# Patient Record
Sex: FEMALE | Race: WHITE | NOT HISPANIC OR LATINO | Employment: OTHER | URBAN - METROPOLITAN AREA
[De-identification: names, ages, dates, MRNs, and addresses within clinical notes are randomized per-mention and may not be internally consistent; named-entity substitution may affect disease eponyms.]

---

## 2017-01-03 ENCOUNTER — ALLSCRIPTS OFFICE VISIT (OUTPATIENT)
Dept: OTHER | Facility: OTHER | Age: 82
End: 2017-01-03

## 2017-01-10 ENCOUNTER — ALLSCRIPTS OFFICE VISIT (OUTPATIENT)
Dept: OTHER | Facility: OTHER | Age: 82
End: 2017-01-10

## 2017-01-13 ENCOUNTER — GENERIC CONVERSION - ENCOUNTER (OUTPATIENT)
Dept: OTHER | Facility: OTHER | Age: 82
End: 2017-01-13

## 2017-01-24 ENCOUNTER — ALLSCRIPTS OFFICE VISIT (OUTPATIENT)
Dept: OTHER | Facility: OTHER | Age: 82
End: 2017-01-24

## 2017-01-24 ENCOUNTER — HOSPITAL ENCOUNTER (OUTPATIENT)
Dept: MRI IMAGING | Facility: HOSPITAL | Age: 82
Discharge: HOME | End: 2017-01-24
Payer: MEDICARE

## 2017-01-24 ENCOUNTER — TRANSCRIBE ORDERS (OUTPATIENT)
Dept: ADMINISTRATIVE | Facility: HOSPITAL | Age: 82
End: 2017-01-24

## 2017-01-24 DIAGNOSIS — M25.572 ACUTE LEFT ANKLE PAIN: ICD-10-CM

## 2017-01-24 DIAGNOSIS — M25.472 SWOLLEN ANKLES: Primary | ICD-10-CM

## 2017-01-24 DIAGNOSIS — M25.472 SWOLLEN ANKLES: ICD-10-CM

## 2017-01-24 DIAGNOSIS — M25.471 SWOLLEN ANKLES: ICD-10-CM

## 2017-01-24 DIAGNOSIS — M25.471 SWOLLEN ANKLES: Primary | ICD-10-CM

## 2017-01-24 PROCEDURE — 73721 MRI JNT OF LWR EXTRE W/O DYE: CPT

## 2017-02-01 ENCOUNTER — APPOINTMENT (OUTPATIENT)
Dept: PHYSICAL THERAPY | Facility: REHABILITATION | Age: 82
End: 2017-02-01
Payer: MEDICARE

## 2017-02-01 PROCEDURE — G8978 MOBILITY CURRENT STATUS: HCPCS

## 2017-02-01 PROCEDURE — 97110 THERAPEUTIC EXERCISES: CPT

## 2017-02-01 PROCEDURE — G8979 MOBILITY GOAL STATUS: HCPCS

## 2017-02-01 PROCEDURE — 97163 PT EVAL HIGH COMPLEX 45 MIN: CPT

## 2017-02-02 ENCOUNTER — APPOINTMENT (OUTPATIENT)
Dept: PHYSICAL THERAPY | Facility: REHABILITATION | Age: 82
End: 2017-02-02
Payer: MEDICARE

## 2017-02-02 PROCEDURE — 97110 THERAPEUTIC EXERCISES: CPT

## 2017-02-02 PROCEDURE — 97140 MANUAL THERAPY 1/> REGIONS: CPT

## 2017-02-06 ENCOUNTER — APPOINTMENT (OUTPATIENT)
Dept: PHYSICAL THERAPY | Facility: REHABILITATION | Age: 82
End: 2017-02-06
Payer: MEDICARE

## 2017-02-06 PROCEDURE — 97110 THERAPEUTIC EXERCISES: CPT

## 2017-02-06 PROCEDURE — 97140 MANUAL THERAPY 1/> REGIONS: CPT

## 2017-02-08 ENCOUNTER — APPOINTMENT (OUTPATIENT)
Dept: PHYSICAL THERAPY | Facility: REHABILITATION | Age: 82
End: 2017-02-08
Payer: MEDICARE

## 2017-02-08 PROCEDURE — 97110 THERAPEUTIC EXERCISES: CPT

## 2017-02-08 PROCEDURE — 97140 MANUAL THERAPY 1/> REGIONS: CPT

## 2017-02-13 ENCOUNTER — APPOINTMENT (OUTPATIENT)
Dept: PHYSICAL THERAPY | Facility: REHABILITATION | Age: 82
End: 2017-02-13
Payer: MEDICARE

## 2017-02-13 PROCEDURE — 97140 MANUAL THERAPY 1/> REGIONS: CPT

## 2017-02-14 ENCOUNTER — APPOINTMENT (OUTPATIENT)
Dept: PHYSICAL THERAPY | Facility: REHABILITATION | Age: 82
End: 2017-02-14
Payer: MEDICARE

## 2017-02-14 PROCEDURE — 97110 THERAPEUTIC EXERCISES: CPT

## 2017-02-14 PROCEDURE — 97140 MANUAL THERAPY 1/> REGIONS: CPT

## 2017-02-17 ENCOUNTER — APPOINTMENT (OUTPATIENT)
Dept: PHYSICAL THERAPY | Facility: REHABILITATION | Age: 82
End: 2017-02-17
Payer: MEDICARE

## 2017-02-17 PROCEDURE — 97140 MANUAL THERAPY 1/> REGIONS: CPT

## 2017-02-17 PROCEDURE — 97110 THERAPEUTIC EXERCISES: CPT

## 2017-02-21 ENCOUNTER — APPOINTMENT (OUTPATIENT)
Dept: PHYSICAL THERAPY | Facility: REHABILITATION | Age: 82
End: 2017-02-21
Payer: MEDICARE

## 2017-02-22 ENCOUNTER — APPOINTMENT (OUTPATIENT)
Dept: PHYSICAL THERAPY | Facility: REHABILITATION | Age: 82
End: 2017-02-22
Payer: MEDICARE

## 2017-02-23 ENCOUNTER — APPOINTMENT (OUTPATIENT)
Dept: PHYSICAL THERAPY | Facility: REHABILITATION | Age: 82
End: 2017-02-23
Payer: MEDICARE

## 2017-02-23 PROCEDURE — 97140 MANUAL THERAPY 1/> REGIONS: CPT

## 2017-02-23 PROCEDURE — 97110 THERAPEUTIC EXERCISES: CPT

## 2017-02-24 ENCOUNTER — APPOINTMENT (OUTPATIENT)
Dept: PHYSICAL THERAPY | Facility: REHABILITATION | Age: 82
End: 2017-02-24
Payer: MEDICARE

## 2017-02-27 ENCOUNTER — APPOINTMENT (OUTPATIENT)
Dept: PHYSICAL THERAPY | Facility: REHABILITATION | Age: 82
End: 2017-02-27
Payer: MEDICARE

## 2017-02-27 PROCEDURE — 97140 MANUAL THERAPY 1/> REGIONS: CPT

## 2017-02-27 PROCEDURE — 97110 THERAPEUTIC EXERCISES: CPT

## 2017-02-28 ENCOUNTER — APPOINTMENT (OUTPATIENT)
Dept: PHYSICAL THERAPY | Facility: REHABILITATION | Age: 82
End: 2017-02-28
Payer: MEDICARE

## 2017-03-02 ENCOUNTER — APPOINTMENT (OUTPATIENT)
Dept: LAB | Facility: CLINIC | Age: 82
End: 2017-03-02
Payer: MEDICARE

## 2017-03-02 ENCOUNTER — APPOINTMENT (OUTPATIENT)
Dept: PHYSICAL THERAPY | Facility: REHABILITATION | Age: 82
End: 2017-03-02
Payer: MEDICARE

## 2017-03-02 ENCOUNTER — TRANSCRIBE ORDERS (OUTPATIENT)
Dept: LAB | Facility: CLINIC | Age: 82
End: 2017-03-02

## 2017-03-02 DIAGNOSIS — J30.9 ALLERGIC RHINITIS: ICD-10-CM

## 2017-03-02 DIAGNOSIS — Z51.81 ENCOUNTER FOR THERAPEUTIC DRUG LEVEL MONITORING: ICD-10-CM

## 2017-03-02 DIAGNOSIS — N32.81 OVERACTIVE BLADDER: ICD-10-CM

## 2017-03-02 DIAGNOSIS — E78.5 HYPERLIPIDEMIA: ICD-10-CM

## 2017-03-02 DIAGNOSIS — G47.00 INSOMNIA: ICD-10-CM

## 2017-03-02 DIAGNOSIS — E55.9 VITAMIN D DEFICIENCY: ICD-10-CM

## 2017-03-02 DIAGNOSIS — I65.29 OCCLUSION AND STENOSIS OF UNSPECIFIED CAROTID ARTERY: ICD-10-CM

## 2017-03-02 DIAGNOSIS — I10 ESSENTIAL (PRIMARY) HYPERTENSION: ICD-10-CM

## 2017-03-02 DIAGNOSIS — E11.65 TYPE 2 DIABETES MELLITUS WITH HYPERGLYCEMIA (HCC): ICD-10-CM

## 2017-03-02 DIAGNOSIS — J45.909 UNCOMPLICATED ASTHMA: ICD-10-CM

## 2017-03-02 LAB
25(OH)D3 SERPL-MCNC: 9 NG/ML (ref 30–100)
ALBUMIN SERPL BCP-MCNC: 3.2 G/DL (ref 3.5–5)
ALP SERPL-CCNC: 94 U/L (ref 46–116)
ALT SERPL W P-5'-P-CCNC: 15 U/L (ref 12–78)
ANION GAP SERPL CALCULATED.3IONS-SCNC: 7 MMOL/L (ref 4–13)
AST SERPL W P-5'-P-CCNC: 10 U/L (ref 5–45)
BASOPHILS # BLD AUTO: 0.03 THOUSANDS/ΜL (ref 0–0.1)
BASOPHILS NFR BLD AUTO: 0 % (ref 0–1)
BILIRUB SERPL-MCNC: 0.71 MG/DL (ref 0.2–1)
BUN SERPL-MCNC: 19 MG/DL (ref 5–25)
CALCIUM SERPL-MCNC: 8.8 MG/DL (ref 8.3–10.1)
CHLORIDE SERPL-SCNC: 102 MMOL/L (ref 100–108)
CHOLEST SERPL-MCNC: 140 MG/DL (ref 50–200)
CO2 SERPL-SCNC: 29 MMOL/L (ref 21–32)
CREAT SERPL-MCNC: 0.86 MG/DL (ref 0.6–1.3)
CREAT UR-MCNC: 163 MG/DL
EOSINOPHIL # BLD AUTO: 0.27 THOUSAND/ΜL (ref 0–0.61)
EOSINOPHIL NFR BLD AUTO: 2 % (ref 0–6)
ERYTHROCYTE [DISTWIDTH] IN BLOOD BY AUTOMATED COUNT: 13.8 % (ref 11.6–15.1)
EST. AVERAGE GLUCOSE BLD GHB EST-MCNC: 209 MG/DL
GFR SERPL CREATININE-BSD FRML MDRD: >60 ML/MIN/1.73SQ M
GLUCOSE SERPL-MCNC: 254 MG/DL (ref 65–140)
HBA1C MFR BLD: 8.9 % (ref 4.2–6.3)
HCT VFR BLD AUTO: 40.7 % (ref 34.8–46.1)
HDLC SERPL-MCNC: 65 MG/DL (ref 40–60)
HGB BLD-MCNC: 13.1 G/DL (ref 11.5–15.4)
LDLC SERPL CALC-MCNC: 49 MG/DL (ref 0–100)
LYMPHOCYTES # BLD AUTO: 2.22 THOUSANDS/ΜL (ref 0.6–4.47)
LYMPHOCYTES NFR BLD AUTO: 19 % (ref 14–44)
MCH RBC QN AUTO: 28.5 PG (ref 26.8–34.3)
MCHC RBC AUTO-ENTMCNC: 32.2 G/DL (ref 31.4–37.4)
MCV RBC AUTO: 89 FL (ref 82–98)
MICROALBUMIN UR-MCNC: 18.3 MG/L (ref 0–20)
MICROALBUMIN/CREAT 24H UR: 11 MG/G CREATININE (ref 0–30)
MONOCYTES # BLD AUTO: 0.73 THOUSAND/ΜL (ref 0.17–1.22)
MONOCYTES NFR BLD AUTO: 6 % (ref 4–12)
NEUTROPHILS # BLD AUTO: 8.64 THOUSANDS/ΜL (ref 1.85–7.62)
NEUTS SEG NFR BLD AUTO: 73 % (ref 43–75)
NRBC BLD AUTO-RTO: 0 /100 WBCS
PLATELET # BLD AUTO: 246 THOUSANDS/UL (ref 149–390)
PMV BLD AUTO: 10.8 FL (ref 8.9–12.7)
POTASSIUM SERPL-SCNC: 4.3 MMOL/L (ref 3.5–5.3)
PROT SERPL-MCNC: 6.6 G/DL (ref 6.4–8.2)
RBC # BLD AUTO: 4.59 MILLION/UL (ref 3.81–5.12)
SODIUM SERPL-SCNC: 138 MMOL/L (ref 136–145)
T4 FREE SERPL-MCNC: 1.15 NG/DL (ref 0.76–1.46)
TRIGL SERPL-MCNC: 131 MG/DL
TSH SERPL DL<=0.05 MIU/L-ACNC: 1.86 UIU/ML (ref 0.36–3.74)
WBC # BLD AUTO: 11.94 THOUSAND/UL (ref 4.31–10.16)

## 2017-03-02 PROCEDURE — 82043 UR ALBUMIN QUANTITATIVE: CPT

## 2017-03-02 PROCEDURE — 80053 COMPREHEN METABOLIC PANEL: CPT

## 2017-03-02 PROCEDURE — 80061 LIPID PANEL: CPT

## 2017-03-02 PROCEDURE — 82570 ASSAY OF URINE CREATININE: CPT

## 2017-03-02 PROCEDURE — 84439 ASSAY OF FREE THYROXINE: CPT

## 2017-03-02 PROCEDURE — 97110 THERAPEUTIC EXERCISES: CPT

## 2017-03-02 PROCEDURE — 83036 HEMOGLOBIN GLYCOSYLATED A1C: CPT

## 2017-03-02 PROCEDURE — 85025 COMPLETE CBC W/AUTO DIFF WBC: CPT

## 2017-03-02 PROCEDURE — G8978 MOBILITY CURRENT STATUS: HCPCS

## 2017-03-02 PROCEDURE — 82306 VITAMIN D 25 HYDROXY: CPT

## 2017-03-02 PROCEDURE — 84443 ASSAY THYROID STIM HORMONE: CPT

## 2017-03-02 PROCEDURE — G8979 MOBILITY GOAL STATUS: HCPCS

## 2017-03-02 PROCEDURE — 36415 COLL VENOUS BLD VENIPUNCTURE: CPT

## 2017-03-02 PROCEDURE — 97140 MANUAL THERAPY 1/> REGIONS: CPT

## 2017-03-03 ENCOUNTER — APPOINTMENT (OUTPATIENT)
Dept: PHYSICAL THERAPY | Facility: REHABILITATION | Age: 82
End: 2017-03-03
Payer: MEDICARE

## 2017-03-06 ENCOUNTER — APPOINTMENT (OUTPATIENT)
Dept: PHYSICAL THERAPY | Facility: REHABILITATION | Age: 82
End: 2017-03-06
Payer: MEDICARE

## 2017-03-06 PROCEDURE — 97110 THERAPEUTIC EXERCISES: CPT

## 2017-03-06 PROCEDURE — 97140 MANUAL THERAPY 1/> REGIONS: CPT

## 2017-03-07 ENCOUNTER — APPOINTMENT (OUTPATIENT)
Dept: PHYSICAL THERAPY | Facility: REHABILITATION | Age: 82
End: 2017-03-07
Payer: MEDICARE

## 2017-03-07 PROCEDURE — 97110 THERAPEUTIC EXERCISES: CPT

## 2017-03-07 PROCEDURE — 97140 MANUAL THERAPY 1/> REGIONS: CPT

## 2017-03-08 ENCOUNTER — ALLSCRIPTS OFFICE VISIT (OUTPATIENT)
Dept: OTHER | Facility: OTHER | Age: 82
End: 2017-03-08

## 2017-03-09 ENCOUNTER — APPOINTMENT (OUTPATIENT)
Dept: PHYSICAL THERAPY | Facility: REHABILITATION | Age: 82
End: 2017-03-09
Payer: MEDICARE

## 2017-03-09 PROCEDURE — 97112 NEUROMUSCULAR REEDUCATION: CPT

## 2017-03-09 PROCEDURE — 97110 THERAPEUTIC EXERCISES: CPT

## 2017-03-13 ENCOUNTER — APPOINTMENT (OUTPATIENT)
Dept: PHYSICAL THERAPY | Facility: REHABILITATION | Age: 82
End: 2017-03-13
Payer: MEDICARE

## 2017-03-13 PROCEDURE — 97110 THERAPEUTIC EXERCISES: CPT

## 2017-03-14 ENCOUNTER — APPOINTMENT (OUTPATIENT)
Dept: PHYSICAL THERAPY | Facility: REHABILITATION | Age: 82
End: 2017-03-14
Payer: MEDICARE

## 2017-03-16 ENCOUNTER — APPOINTMENT (OUTPATIENT)
Dept: PHYSICAL THERAPY | Facility: REHABILITATION | Age: 82
End: 2017-03-16
Payer: MEDICARE

## 2017-03-20 ENCOUNTER — APPOINTMENT (OUTPATIENT)
Dept: PHYSICAL THERAPY | Facility: REHABILITATION | Age: 82
End: 2017-03-20
Payer: MEDICARE

## 2017-03-21 ENCOUNTER — APPOINTMENT (OUTPATIENT)
Dept: PHYSICAL THERAPY | Facility: REHABILITATION | Age: 82
End: 2017-03-21
Payer: MEDICARE

## 2017-03-22 ENCOUNTER — APPOINTMENT (OUTPATIENT)
Dept: PHYSICAL THERAPY | Facility: REHABILITATION | Age: 82
End: 2017-03-22
Payer: MEDICARE

## 2017-03-23 ENCOUNTER — APPOINTMENT (OUTPATIENT)
Dept: PHYSICAL THERAPY | Facility: REHABILITATION | Age: 82
End: 2017-03-23
Payer: MEDICARE

## 2017-03-27 ENCOUNTER — APPOINTMENT (OUTPATIENT)
Dept: PHYSICAL THERAPY | Facility: REHABILITATION | Age: 82
End: 2017-03-27
Payer: MEDICARE

## 2017-03-27 PROCEDURE — 97112 NEUROMUSCULAR REEDUCATION: CPT

## 2017-03-27 PROCEDURE — 97110 THERAPEUTIC EXERCISES: CPT

## 2017-03-28 ENCOUNTER — APPOINTMENT (OUTPATIENT)
Dept: PHYSICAL THERAPY | Facility: REHABILITATION | Age: 82
End: 2017-03-28
Payer: MEDICARE

## 2017-03-30 ENCOUNTER — APPOINTMENT (OUTPATIENT)
Dept: PHYSICAL THERAPY | Facility: REHABILITATION | Age: 82
End: 2017-03-30
Payer: MEDICARE

## 2017-03-30 PROCEDURE — 97110 THERAPEUTIC EXERCISES: CPT

## 2017-03-30 PROCEDURE — 97116 GAIT TRAINING THERAPY: CPT

## 2017-03-30 PROCEDURE — 97140 MANUAL THERAPY 1/> REGIONS: CPT

## 2017-03-30 PROCEDURE — G8979 MOBILITY GOAL STATUS: HCPCS

## 2017-03-30 PROCEDURE — G8978 MOBILITY CURRENT STATUS: HCPCS

## 2017-04-03 ENCOUNTER — APPOINTMENT (OUTPATIENT)
Dept: PHYSICAL THERAPY | Facility: REHABILITATION | Age: 82
End: 2017-04-03
Payer: MEDICARE

## 2017-04-04 ENCOUNTER — APPOINTMENT (OUTPATIENT)
Dept: PHYSICAL THERAPY | Facility: REHABILITATION | Age: 82
End: 2017-04-04
Payer: MEDICARE

## 2017-04-04 PROCEDURE — 97112 NEUROMUSCULAR REEDUCATION: CPT

## 2017-04-04 PROCEDURE — 97110 THERAPEUTIC EXERCISES: CPT

## 2017-04-06 ENCOUNTER — APPOINTMENT (OUTPATIENT)
Dept: PHYSICAL THERAPY | Facility: REHABILITATION | Age: 82
End: 2017-04-06
Payer: MEDICARE

## 2017-04-06 PROCEDURE — 97112 NEUROMUSCULAR REEDUCATION: CPT

## 2017-04-06 PROCEDURE — 97110 THERAPEUTIC EXERCISES: CPT

## 2017-04-10 ENCOUNTER — APPOINTMENT (OUTPATIENT)
Dept: PHYSICAL THERAPY | Facility: REHABILITATION | Age: 82
End: 2017-04-10
Payer: MEDICARE

## 2017-04-10 PROCEDURE — 97110 THERAPEUTIC EXERCISES: CPT

## 2017-04-10 PROCEDURE — 97112 NEUROMUSCULAR REEDUCATION: CPT

## 2017-04-13 ENCOUNTER — APPOINTMENT (OUTPATIENT)
Dept: PHYSICAL THERAPY | Facility: REHABILITATION | Age: 82
End: 2017-04-13
Payer: MEDICARE

## 2017-04-13 PROCEDURE — 97110 THERAPEUTIC EXERCISES: CPT

## 2017-04-13 PROCEDURE — 97112 NEUROMUSCULAR REEDUCATION: CPT

## 2017-04-18 ENCOUNTER — APPOINTMENT (OUTPATIENT)
Dept: PHYSICAL THERAPY | Facility: REHABILITATION | Age: 82
End: 2017-04-18
Payer: MEDICARE

## 2017-04-18 PROCEDURE — 97112 NEUROMUSCULAR REEDUCATION: CPT

## 2017-04-18 PROCEDURE — 97110 THERAPEUTIC EXERCISES: CPT

## 2017-04-20 ENCOUNTER — APPOINTMENT (OUTPATIENT)
Dept: PHYSICAL THERAPY | Facility: REHABILITATION | Age: 82
End: 2017-04-20
Payer: MEDICARE

## 2017-04-20 PROCEDURE — 97110 THERAPEUTIC EXERCISES: CPT

## 2017-04-20 PROCEDURE — 97112 NEUROMUSCULAR REEDUCATION: CPT

## 2017-04-24 ENCOUNTER — APPOINTMENT (OUTPATIENT)
Dept: PHYSICAL THERAPY | Facility: REHABILITATION | Age: 82
End: 2017-04-24
Payer: MEDICARE

## 2017-04-24 PROCEDURE — 97110 THERAPEUTIC EXERCISES: CPT

## 2017-04-24 PROCEDURE — 97112 NEUROMUSCULAR REEDUCATION: CPT

## 2017-04-27 ENCOUNTER — APPOINTMENT (OUTPATIENT)
Dept: PHYSICAL THERAPY | Facility: REHABILITATION | Age: 82
End: 2017-04-27
Payer: MEDICARE

## 2017-04-27 PROCEDURE — G8991 OTHER PT/OT GOAL STATUS: HCPCS

## 2017-04-27 PROCEDURE — 97140 MANUAL THERAPY 1/> REGIONS: CPT

## 2017-04-27 PROCEDURE — 97110 THERAPEUTIC EXERCISES: CPT

## 2017-04-27 PROCEDURE — 97112 NEUROMUSCULAR REEDUCATION: CPT

## 2017-04-27 PROCEDURE — G8990 OTHER PT/OT CURRENT STATUS: HCPCS

## 2017-05-02 ENCOUNTER — APPOINTMENT (OUTPATIENT)
Dept: PHYSICAL THERAPY | Facility: REHABILITATION | Age: 82
End: 2017-05-02
Payer: MEDICARE

## 2017-05-02 PROCEDURE — 97140 MANUAL THERAPY 1/> REGIONS: CPT

## 2017-05-02 PROCEDURE — 97110 THERAPEUTIC EXERCISES: CPT

## 2017-05-03 ENCOUNTER — APPOINTMENT (OUTPATIENT)
Dept: PHYSICAL THERAPY | Facility: REHABILITATION | Age: 82
End: 2017-05-03
Payer: MEDICARE

## 2017-05-03 PROCEDURE — 97112 NEUROMUSCULAR REEDUCATION: CPT

## 2017-05-03 PROCEDURE — 97150 GROUP THERAPEUTIC PROCEDURES: CPT

## 2017-05-03 PROCEDURE — 97110 THERAPEUTIC EXERCISES: CPT

## 2017-05-04 ENCOUNTER — APPOINTMENT (OUTPATIENT)
Dept: PHYSICAL THERAPY | Facility: REHABILITATION | Age: 82
End: 2017-05-04
Payer: MEDICARE

## 2017-05-09 ENCOUNTER — APPOINTMENT (OUTPATIENT)
Dept: PHYSICAL THERAPY | Facility: REHABILITATION | Age: 82
End: 2017-05-09
Payer: MEDICARE

## 2017-05-09 PROCEDURE — 97112 NEUROMUSCULAR REEDUCATION: CPT

## 2017-05-09 PROCEDURE — 97116 GAIT TRAINING THERAPY: CPT

## 2017-05-09 PROCEDURE — 97110 THERAPEUTIC EXERCISES: CPT

## 2017-05-11 ENCOUNTER — APPOINTMENT (OUTPATIENT)
Dept: PHYSICAL THERAPY | Facility: REHABILITATION | Age: 82
End: 2017-05-11
Payer: MEDICARE

## 2017-05-11 PROCEDURE — 97110 THERAPEUTIC EXERCISES: CPT

## 2017-05-11 PROCEDURE — 97116 GAIT TRAINING THERAPY: CPT

## 2017-05-11 PROCEDURE — 97112 NEUROMUSCULAR REEDUCATION: CPT

## 2017-05-16 ENCOUNTER — APPOINTMENT (OUTPATIENT)
Dept: PHYSICAL THERAPY | Facility: REHABILITATION | Age: 82
End: 2017-05-16
Payer: MEDICARE

## 2017-05-16 PROCEDURE — 97116 GAIT TRAINING THERAPY: CPT

## 2017-05-16 PROCEDURE — 97110 THERAPEUTIC EXERCISES: CPT

## 2017-05-16 PROCEDURE — 97112 NEUROMUSCULAR REEDUCATION: CPT

## 2017-05-18 ENCOUNTER — APPOINTMENT (OUTPATIENT)
Dept: PHYSICAL THERAPY | Facility: REHABILITATION | Age: 82
End: 2017-05-18
Payer: MEDICARE

## 2017-05-18 PROCEDURE — 97116 GAIT TRAINING THERAPY: CPT

## 2017-05-18 PROCEDURE — 97112 NEUROMUSCULAR REEDUCATION: CPT

## 2017-05-22 ENCOUNTER — APPOINTMENT (OUTPATIENT)
Dept: PHYSICAL THERAPY | Facility: REHABILITATION | Age: 82
End: 2017-05-22
Payer: MEDICARE

## 2017-05-22 PROCEDURE — 97116 GAIT TRAINING THERAPY: CPT

## 2017-05-22 PROCEDURE — 97112 NEUROMUSCULAR REEDUCATION: CPT

## 2017-05-22 PROCEDURE — 97110 THERAPEUTIC EXERCISES: CPT

## 2017-05-23 ENCOUNTER — APPOINTMENT (OUTPATIENT)
Dept: PHYSICAL THERAPY | Facility: REHABILITATION | Age: 82
End: 2017-05-23
Payer: MEDICARE

## 2017-05-25 ENCOUNTER — APPOINTMENT (OUTPATIENT)
Dept: PHYSICAL THERAPY | Facility: REHABILITATION | Age: 82
End: 2017-05-25
Payer: MEDICARE

## 2017-05-25 PROCEDURE — 97110 THERAPEUTIC EXERCISES: CPT

## 2017-05-25 PROCEDURE — G8991 OTHER PT/OT GOAL STATUS: HCPCS

## 2017-05-25 PROCEDURE — G8992 OTHER PT/OT  D/C STATUS: HCPCS

## 2017-05-25 PROCEDURE — 97116 GAIT TRAINING THERAPY: CPT

## 2017-05-25 PROCEDURE — 97112 NEUROMUSCULAR REEDUCATION: CPT

## 2017-05-30 ENCOUNTER — APPOINTMENT (OUTPATIENT)
Dept: PHYSICAL THERAPY | Facility: REHABILITATION | Age: 82
End: 2017-05-30
Payer: MEDICARE

## 2017-06-05 ENCOUNTER — GENERIC CONVERSION - ENCOUNTER (OUTPATIENT)
Dept: OTHER | Facility: OTHER | Age: 82
End: 2017-06-05

## 2017-06-08 ENCOUNTER — TRANSCRIBE ORDERS (OUTPATIENT)
Dept: LAB | Facility: CLINIC | Age: 82
End: 2017-06-08

## 2017-06-08 ENCOUNTER — APPOINTMENT (OUTPATIENT)
Dept: LAB | Facility: CLINIC | Age: 82
End: 2017-06-08
Payer: MEDICARE

## 2017-06-08 DIAGNOSIS — I10 ESSENTIAL (PRIMARY) HYPERTENSION: ICD-10-CM

## 2017-06-08 DIAGNOSIS — E55.9 VITAMIN D DEFICIENCY: ICD-10-CM

## 2017-06-08 DIAGNOSIS — E78.5 HYPERLIPIDEMIA: ICD-10-CM

## 2017-06-08 DIAGNOSIS — E11.65 TYPE 2 DIABETES MELLITUS WITH HYPERGLYCEMIA (HCC): ICD-10-CM

## 2017-06-08 LAB
25(OH)D3 SERPL-MCNC: 48.9 NG/ML (ref 30–100)
ANION GAP SERPL CALCULATED.3IONS-SCNC: 8 MMOL/L (ref 4–13)
BUN SERPL-MCNC: 19 MG/DL (ref 5–25)
CALCIUM SERPL-MCNC: 9.5 MG/DL (ref 8.3–10.1)
CHLORIDE SERPL-SCNC: 101 MMOL/L (ref 100–108)
CO2 SERPL-SCNC: 29 MMOL/L (ref 21–32)
CREAT SERPL-MCNC: 0.93 MG/DL (ref 0.6–1.3)
EST. AVERAGE GLUCOSE BLD GHB EST-MCNC: 217 MG/DL
GFR SERPL CREATININE-BSD FRML MDRD: 57.7 ML/MIN/1.73SQ M
GLUCOSE P FAST SERPL-MCNC: 239 MG/DL (ref 65–99)
HBA1C MFR BLD: 9.2 % (ref 4.2–6.3)
POTASSIUM SERPL-SCNC: 4.5 MMOL/L (ref 3.5–5.3)
SODIUM SERPL-SCNC: 138 MMOL/L (ref 136–145)

## 2017-06-08 PROCEDURE — 82306 VITAMIN D 25 HYDROXY: CPT

## 2017-06-08 PROCEDURE — 36415 COLL VENOUS BLD VENIPUNCTURE: CPT

## 2017-06-08 PROCEDURE — 83036 HEMOGLOBIN GLYCOSYLATED A1C: CPT

## 2017-06-08 PROCEDURE — 80048 BASIC METABOLIC PNL TOTAL CA: CPT

## 2017-06-13 ENCOUNTER — ALLSCRIPTS OFFICE VISIT (OUTPATIENT)
Dept: OTHER | Facility: OTHER | Age: 82
End: 2017-06-13

## 2017-07-11 ENCOUNTER — TRANSCRIBE ORDERS (OUTPATIENT)
Dept: ADMINISTRATIVE | Facility: HOSPITAL | Age: 82
End: 2017-07-11

## 2017-07-11 DIAGNOSIS — Z12.31 VISIT FOR SCREENING MAMMOGRAM: Primary | ICD-10-CM

## 2017-07-11 DIAGNOSIS — Z12.31 ENCOUNTER FOR SCREENING MAMMOGRAM FOR MALIGNANT NEOPLASM OF BREAST: ICD-10-CM

## 2017-07-21 ENCOUNTER — HOSPITAL ENCOUNTER (OUTPATIENT)
Dept: MAMMOGRAPHY | Facility: MEDICAL CENTER | Age: 82
Discharge: HOME | End: 2017-07-21
Payer: MEDICARE

## 2017-07-21 DIAGNOSIS — Z12.31 ENCOUNTER FOR SCREENING MAMMOGRAM FOR MALIGNANT NEOPLASM OF BREAST: ICD-10-CM

## 2017-07-21 PROCEDURE — G0202 SCR MAMMO BI INCL CAD: HCPCS

## 2017-07-21 PROCEDURE — 77063 BREAST TOMOSYNTHESIS BI: CPT

## 2017-07-24 ENCOUNTER — GENERIC CONVERSION - ENCOUNTER (OUTPATIENT)
Dept: OTHER | Facility: OTHER | Age: 82
End: 2017-07-24

## 2017-09-13 DIAGNOSIS — E11.65 TYPE 2 DIABETES MELLITUS WITH HYPERGLYCEMIA (HCC): ICD-10-CM

## 2018-01-10 ENCOUNTER — TRANSCRIBE ORDERS (OUTPATIENT)
Dept: LAB | Facility: CLINIC | Age: 83
End: 2018-01-10

## 2018-01-10 ENCOUNTER — APPOINTMENT (OUTPATIENT)
Dept: LAB | Facility: CLINIC | Age: 83
End: 2018-01-10
Payer: MEDICARE

## 2018-01-10 DIAGNOSIS — Z01.818 PREOP EXAMINATION: Primary | ICD-10-CM

## 2018-01-10 LAB
BASOPHILS # BLD AUTO: 0.04 THOUSANDS/ΜL (ref 0–0.1)
BASOPHILS NFR BLD AUTO: 0 % (ref 0–1)
EOSINOPHIL # BLD AUTO: 0.2 THOUSAND/ΜL (ref 0–0.61)
EOSINOPHIL NFR BLD AUTO: 2 % (ref 0–6)
ERYTHROCYTE [DISTWIDTH] IN BLOOD BY AUTOMATED COUNT: 13.2 % (ref 11.6–15.1)
HCT VFR BLD AUTO: 44.1 % (ref 34.8–46.1)
HGB BLD-MCNC: 14 G/DL (ref 11.5–15.4)
LYMPHOCYTES # BLD AUTO: 2.97 THOUSANDS/ΜL (ref 0.6–4.47)
LYMPHOCYTES NFR BLD AUTO: 24 % (ref 14–44)
MCH RBC QN AUTO: 29.5 PG (ref 26.8–34.3)
MCHC RBC AUTO-ENTMCNC: 31.7 G/DL (ref 31.4–37.4)
MCV RBC AUTO: 93 FL (ref 82–98)
MONOCYTES # BLD AUTO: 0.73 THOUSAND/ΜL (ref 0.17–1.22)
MONOCYTES NFR BLD AUTO: 6 % (ref 4–12)
NEUTROPHILS # BLD AUTO: 8.43 THOUSANDS/ΜL (ref 1.85–7.62)
NEUTS SEG NFR BLD AUTO: 68 % (ref 43–75)
NRBC BLD AUTO-RTO: 0 /100 WBCS
PLATELET # BLD AUTO: 269 THOUSANDS/UL (ref 149–390)
PMV BLD AUTO: 10.6 FL (ref 8.9–12.7)
RBC # BLD AUTO: 4.74 MILLION/UL (ref 3.81–5.12)
WBC # BLD AUTO: 12.4 THOUSAND/UL (ref 4.31–10.16)

## 2018-01-10 PROCEDURE — 36415 COLL VENOUS BLD VENIPUNCTURE: CPT

## 2018-01-10 PROCEDURE — 85025 COMPLETE CBC W/AUTO DIFF WBC: CPT

## 2018-01-11 NOTE — RESULT NOTES
Verified Results  (1) URINALYSIS w URINE C/S REFLEX (will reflex a microscopy if leukocytes, occult blood, or nitrites are not within normal limits) [id number] 10:11AM John Vargas     Test Name Result Flag Reference   CLINICAL REPORT (Report)     Test:        Urine culture  Specimen Type:   Urine  Specimen Date:   1/14/2016 9:22 PM  Result Date:    1/16/2016 10:11 AM  Result Status:   Final result  Resulting Lab:   [place] [address]            Tel: [phone]                 CULTURE                                       ------------------                                   >100,000 cfu/ml Mixed Contaminants X5     *** Suggest repeat specimen ***

## 2018-01-11 NOTE — RESULT NOTES
Discussion/Summary   Mammo normal    Repeat in 1 year  CB     Verified Results  MAMMO SCREENING BILATERAL W 3D & CAD [id number] 10:33AM Tom Soriano Order Number: [id number]    - Patient Instructions: To schedule this appointment, please contact Central Scheduling at [phone]  Do not wear any perfume, powder, lotion or deodorant on breast or underarm area  Please bring your doctors order, referral (if needed) and insurance information with you on the day of the test  Failure to bring this information may result in this test being rescheduled  Arrive 15 minutes prior to your appointment time to register  On the day of your test, please bring any prior mammogram or breast studies with you that were not performed at a [facility]  Failure to bring prior exams may result in your test needing to be rescheduled  Test Name Result Flag Reference   MAMMO SCREENING BILATERAL W 3D & CAD (Report)     Patient History:   Patient is postmenopausal and has history of colorectal cancer at   age 52  Family history of breast cancer in maternal grandmother, unknown    cancer in father, unknown cancer in mother  No Hormone Replacement Therapy   Patient has never smoked  Patient's BMI is 36 9  Reason for exam: screening, asymptomatic  Mammo Screening Bilateral W DBT and CAD: July 21, 2017 - Check In   #: [de-identified]   2D/3D Procedure   3D views: Bilateral MLO view(s) were taken  2D views: Bilateral CC view(s) were taken  Technologist: RT Rita(R)(M)   Prior study comparison: July 19, 2016, mammo screening bilateral    W CAD performed at [place] [address]  July 8, 2015, bilateral digital screening mammogram performed at    [place] [address]  July 2, 2014,    bilateral digital screening mammogram performed at [place] [address]  June 20, 2013, bilateral    mammogram, performed at [facility]  June 14, 2012,    bilateral mammogram, performed at [facility]  There are scattered fibroglandular densities  A combination of    mediolateral oblique 3-D tomographic slices as well as standard    two-dimensional orthogonal images were obtained  The parenchymal pattern appears stable  No dominant soft tissue    mass or suspicious calcifications are noted  The skin and nipple   contours are within normal limits  No mammographic evidence of malignancy  No    significant changes when compared with prior studies  ACR BI-RADSï¾® Assessments: BiRad:1 - Negative     Recommendation:   Routine screening mammogram in 1 year  A reminder letter will be   scheduled  The patient is scheduled in a reminder system  8-10% of cancers will be missed on mammography  Management of a    palpable abnormality must be based on clinical grounds  Patients    will be notified of their results via letter from our facility  Accredited by [employer] and FDA       Transcription Location: [facility] 98: [id number]     Risk Value(s):   Tyrer-Cuzick 10 Year: 1 500%, Tyrer-Cuzick Lifetime: 1 500%,    Myriad Table: 1 5%, EDWINA 5 Year: 1 4%, NCI Lifetime: 1 9%   Signed by:   Toña Jsohi MD   7/21/17

## 2018-01-11 NOTE — MISCELLANEOUS
Message   Recorded as Task   Date: 06/15/2016 10:44 AM, Created By: Jayson Holden   Task Name: Call Back   Assigned To: Marian Russell   Regarding Patient: Raj Godwin, Status: Active   Comment:    Marian Russell - 15 Wm 2016 10:44 AM     TASK CREATED  pt called requesting refill on lisinopril 10 mg that was started by ortho after her knee replacement surgery  she was told to discontinue benazepril - this was noted in Dr Oj Marcus note of 25 May  is it ok to continue with lisinopril? she will need refills    Marian Russell - 15 Wm 2016 10:44 AM     TASK EDITED  pt uses [facility]  90 day   Chloe Levy - 15 Wm 2016 1:29 PM     TASK REPLIED TO: Previously Assigned To Chloe Levy  Yes  Continue Lisinopril 10 mg daily  Thank you,  CB   Marian Russell - 15 Wm 2016 3:59 PM     TASK EDITED  Rx sent in, patient notified        Active Problems    1  Allergic rhinitis (477 9) (J30 9)   2  Anticoagulated on Coumadin (V58 83,V58 61) (Z51 81,Z79 01)   3  Asthma (493 90) (J45 909)   4  Carotid artery stenosis (433 10) (I65 29)   5  Cough (786 2) (R05)   6  Diabetes mellitus type 2, uncontrolled (250 02) (E11 65)   7  Diarrhea (787 91) (R19 7)   8  H/O colon cancer, stage I (V10 05) (Z85 038)   9  Hyperlipidemia (272 4) (E78 5)   10  Hypertension (401 9) (I10)   11  Initial Medicare annual wellness visit (V70 0) (Z00 00)   12  Insomnia (780 52) (G47 00)   13  Need for pneumococcal vaccine (V03 82) (Z23)   14  Need for prophylactic vaccination and inoculation against influenza (V04 81) (Z23)   15  OAB (overactive bladder) (596 51) (N32 81)   16  Osteoarthritis (715 90) (M19 90)   17  Preoperative examination (V72 84) (Z01 818)   18  Screening for genitourinary condition (V81 6) (Z13 89)   19  Screening for glaucoma (V80 1) (Z13 5)   20  Screening for neurological condition (V80 09) (Z13 89)   21  Status post total left knee replacement (V43 65) (Z96 652)   22   Urgency of urination (118 63) (R39 15)   23  Visit for pre-operative examination (V72 84) (Z01 818)   24  Visit for screening mammogram (V76 12) (Z12 31)   25  Vitamin D deficiency (268 9) (E55 9)    Current Meds   1  Breo Ellipta 100-25 MCG/INH Inhalation Aerosol Powder Breath Activated; ONE   INHALATION DAILY  AFTER INHALATION RINSE MOUTH WITH WATER & SPIT  USE   SAME TIME EACH DAY, NO MORE THAN 1 TIME IN 24 HOURS; Therapy: 50SMM5322 to (Last Rx:05Apr2016)  Requested for: 05Apr2016 Ordered   2  Glimepiride 2 MG Oral Tablet; TAKE 1 TABLET ONCE DAILY; Therapy: 19ICJ9343 to (Evaluate:10Mar2017)  Requested for: 04KZR2711; Last   Rx:15Mar2016 Ordered   3  MetFORMIN HCl - 500 MG Oral Tablet; TAKE TWO (2) TABLET(S) TWICE DAILY WITH A   MEAL; Therapy: 14GAQ3910 to (Evaluate:13Jun2016)  Requested for: 95QME4783; Last   Rx:99Pxn3016 Ordered   4  OneTouch Ultra 2 w/Device Kit; USE AS DIRECTED  Once daily; Therapy: 74MXD8743 to (Last Rx:13Jan2016) Ordered   5  Oxybutynin Chloride 5 MG Oral Tablet; TAKE 1 TABLET AT BEDTIME; Therapy: 55SAG1893 to (Last Rx:20Jan2016)  Requested for: 20Jan2016 Ordered   6  Simvastatin 10 MG Oral Tablet; TAKE 1 TABLET DAILY AS DIRECTED; Therapy: 96ORZ1072 to (Evaluate:25Mar2016)  Requested for: 82XYR1806; Last   Rx:31Mar2015 Ordered    Allergies    1  Codeine   2  Doxycycline Hyclate TABS   3   Penicillins    Plan  Hypertension    · Lisinopril 10 MG Oral Tablet; TAKE 1 TABLET DAILY    Signatures   Electronically signed by : Jamshid Glass, ; Wm 15 2016  4:00PM EST                       (Author)

## 2018-01-11 NOTE — RESULT NOTES
Verified Results  (1) URINE CULTURE 29Jan2016 08:34PM Ania Vargas     Test Name Result Flag Reference   CLINICAL REPORT (Report)     Test:        Urine culture  Specimen Type:   Urine  Specimen Date:   1/29/2016 8:34 PM  Result Date:    1/30/2016 5:47 PM  Result Status:   Final result  Resulting Lab:    [address] [place] [address]            Tel: [phone]                 CULTURE                                       ------------------                                   60,000-69,000 cfu/ml Mixed Contaminants X4

## 2018-01-12 VITALS
WEIGHT: 220 LBS | HEIGHT: 64 IN | SYSTOLIC BLOOD PRESSURE: 140 MMHG | HEART RATE: 88 BPM | BODY MASS INDEX: 37.56 KG/M2 | DIASTOLIC BLOOD PRESSURE: 82 MMHG

## 2018-01-13 VITALS — DIASTOLIC BLOOD PRESSURE: 60 MMHG | HEART RATE: 80 BPM | SYSTOLIC BLOOD PRESSURE: 140 MMHG | WEIGHT: 218 LBS

## 2018-01-14 VITALS
HEIGHT: 64 IN | BODY MASS INDEX: 36.7 KG/M2 | SYSTOLIC BLOOD PRESSURE: 132 MMHG | HEART RATE: 64 BPM | WEIGHT: 215 LBS | DIASTOLIC BLOOD PRESSURE: 74 MMHG

## 2018-01-14 VITALS
HEIGHT: 64 IN | WEIGHT: 218 LBS | SYSTOLIC BLOOD PRESSURE: 132 MMHG | DIASTOLIC BLOOD PRESSURE: 80 MMHG | BODY MASS INDEX: 37.22 KG/M2 | TEMPERATURE: 98.4 F | HEART RATE: 80 BPM

## 2018-01-14 NOTE — RESULT NOTES
Message   Recorded as Task   Date: 09/27/2016 01:55 PM, Created By: Carrington Blue   Task Name: Follow Up   Assigned To: Marian Russell   Regarding Patient: Lisset Woodward, Status: Active   Comment:    Chloe Levy - 27 Sep 2016 1:55 PM     TASK CREATED  Patient had an ECHO for SOB  Please call to tell her it was fine   No further intervention needed at this time unless her symptoms worsen or persist     Thank you,  CB   Marian Russell - 28 Sep 2016 8:26 AM     TASK EDITED                 [facility] informing pt of results and recommendations     Signatures   Electronically signed by : Mary Painting, ; Sep 28 2016  8:27AM EST                       (Author)

## 2018-01-15 ENCOUNTER — GENERIC CONVERSION - ENCOUNTER (OUTPATIENT)
Dept: FAMILY MEDICINE CLINIC | Facility: CLINIC | Age: 83
End: 2018-01-15

## 2018-01-15 VITALS
SYSTOLIC BLOOD PRESSURE: 110 MMHG | BODY MASS INDEX: 37.39 KG/M2 | WEIGHT: 219 LBS | HEIGHT: 64 IN | HEART RATE: 84 BPM | TEMPERATURE: 99.2 F | DIASTOLIC BLOOD PRESSURE: 72 MMHG

## 2018-01-16 NOTE — RESULT NOTES
Verified Results  (1) URINALYSIS w URINE C/S REFLEX (will reflex a microscopy if leukocytes, occult blood, or nitrites are not within normal limits) [id number] 05:55AM John Vargas     Test Name Result Flag Reference   BACTERIA Moderate /hpf A None Seen, Occasional   EPITHELIAL CELLS None Seen /hpf  None Seen, Occasional   HYALINE CASTS 5-10 /lpf A 0-3   RBC UA 2-4 /hpf  None Seen, 2-4, 0-1, 1-2   WBC UA Innumerable /hpf A None Seen, 2-4     (1) URINALYSIS w URINE C/S REFLEX (will reflex a microscopy if leukocytes, occult blood, or nitrites are not within normal limits) [id number] 05:51AM John Vargas     Test Name Result Flag Reference   COLOR Yellow     CLARITY Cloudy     PH UA 6 0  4 5-8 0   LEUKOCYTE ESTERASE UA Moderate A Negative   NITRITE UA Negative  Negative   PROTEIN UA Trace mg/dl A Negative   GLUCOSE UA Negative mg/dl  Negative   KETONES UA Negative mg/dl  Negative   UROBILINOGEN UA 1 0 E U /dl  0 2, 1 0 E U /dl   BILIRUBIN UA Negative  Negative   BLOOD UA Negative  Negative   SPECIFIC GRAVITY UA 1 026  1 003-1 030

## 2018-01-17 NOTE — MISCELLANEOUS
Message  Pt  called stated she was started on Levaquin on 1/10/17 and two days later started with ankle pt  pt  did not remember injuring her ankle but assumed she had  This morning pt  woke up to pain in both ankles and can not walk  Pt  is questioning if this could be a side effect of the medication  I spoke to Ki Villafana who advised for pt  to stop taking Levaquin and start z-dayron for 5 days  Pt  is aware and medication sent to pharmacy  Pt  told to f u next wk if still having symptoms  Active Problems    1  Acute sinusitis (461 9) (J01 90)   2  Allergic rhinitis (477 9) (J30 9)   3  Anticoagulated on Coumadin (V58 83,V58 61) (Z51 81,Z79 01)   4  Asthma (493 90) (J45 909)   5  Atypical pneumonia (486) (J18 9)   6  Carotid artery stenosis (433 10) (I65 29)   7  Cough (786 2) (R05)   8  Diabetes mellitus type 2, uncontrolled (250 02) (E11 65)   9  H/O colon cancer, stage I (V10 05) (Z85 038)   10  Hyperlipidemia (272 4) (E78 5)   11  Hypertension (401 9) (I10)   12  Insomnia (780 52) (G47 00)   13  OAB (overactive bladder) (596 51) (N32 81)   14  Osteoarthritis (715 90) (M19 90)   15  Screening for genitourinary condition (V81 6) (Z13 89)   16  Shortness of breath on exertion (786 05) (R06 02)   17  Vitamin D deficiency (268 9) (E55 9)    Current Meds   1  Advair Diskus 250-50 MCG/DOSE Inhalation Aerosol Powder Breath Activated; USE   ONE INHALATION TWO TIMES A DAY; Therapy: 47IZT8713 to (Evaluate:11Jun2017)  Requested for: 65Ael9160; Last   Rx:47Lxk6520 Ordered   2  Aspirin 81 MG Oral Tablet Delayed Release Recorded   3  Benazepril-Hydrochlorothiazide 10-12 5 MG Oral Tablet; TAKE ONE tablet(s) DAILY; Therapy: 61DTG8722 to (Evaluate:21Ocq7644)  Requested for: 60Yom5959; Last   Rx:83Ila9561 Ordered   4  Glimepiride 2 MG Oral Tablet; TAKE 1 TABLET ONCE DAILY; Therapy: 37WYR1144 to (Evaluate:10Mar2017)  Requested for: 15UWN2892; Last   Rx:15Mar2016 Ordered   5   LevoFLOXacin 500 MG Oral Tablet (Levaquin); Take 1 tablet daily; Therapy: 37KBO5536 to (Last Rx:10Jan2017)  Requested for: 53EFY4740 Ordered   6  MetFORMIN HCl - 500 MG Oral Tablet; TAKE TWO (2) TABLET(S) TWICE DAILY WITH A   MEAL; Therapy: 75EVV6804 to (Evaluate:15Mar2017)  Requested for: 11Xsz1720; Last   Rx:64Cpt9149 Ordered   7  OneTouch Ultra 2 w/Device Kit; USE AS DIRECTED  Once daily; Therapy: 67IRJ4813 to (Last Rx:13Jan2016) Ordered   8  Promethazine-DM 6 25-15 MG/5ML Oral Syrup; 2 tsp qHs prn; Therapy: 19JVV0808 to (Last Rx:10Jan2017)  Requested for: 81FXH3701 Ordered   9  Simvastatin 10 MG Oral Tablet; TAKE ONE tablet(s) DAILY as directed; Therapy: 87YRP8240 to (Evaluate:23Jek7860)  Requested for: 01XFX3868; Last   Rx:63Sgx7049 Ordered    Allergies    1  Codeine   2  Doxycycline Hyclate TABS   3   Penicillins    Plan  Cough    · Zithromax Z-Heath 250 MG Oral Tablet (Azithromycin); TAKE 2 TABLETS ON DAY 1  THEN TAKE 1 TABLET A DAY FOR 4 DAYS    Signatures   Electronically signed by : Maggie Todd, ; Jan 13 2017 12:07PM EST                       (Author)

## 2018-01-18 NOTE — RESULT NOTES
Verified Results  * MAMMO SCREENING BILATERAL W CAD [id number] 02:44PM Santos Godwin Order Number: [id number]     Test Name Result Flag Reference   MAMMO SCREENING BILATERAL W CAD (Report)     Patient History:   Patient is postmenopausal and has history of colorectal cancer at   age 52  Family history of unknown cancer in father, unknown cancer in    mother, and breast cancer in maternal grandmother  Patient has never smoked  Patient's BMI is 36 9  Reason for exam: screening (asymptomatic)  Mammo Screening Bilateral W CAD: July 19, 2016 - Check In #:    [de-identified]   Bilateral CC view(s) were taken  MLO view(s) were taken of the    right breast      Technologist: RON Murray (R)(M)   Prior study comparison: July 8, 2015, bilateral digital screening   mammogram performed at [place] [address]  July 2, 2014, bilateral digital screening mammogram    performed at [place] [address]  June 20, 2013, bilateral mammogram, performed at [facility]  June 14, 2012, bilateral mammogram, performed at    [facility]  June 13, 2011, bilateral mammogram,    performed at [facility]  There are scattered fibroglandular densities  The parenchymal pattern appears stable  No dominant soft tissue    mass or suspicious calcifications are noted  The skin and nipple   contours are within normal limits  No mammographic evidence of malignancy  No    significant changes when compared with prior studies  ASSESSMENT: BiRad:1 - Negative     Recommendation:   Routine screening mammogram in 1 year  A reminder letter will be   scheduled  Analyzed by CAD     8-10% of cancers will be missed on mammography  Management of a    palpable abnormality must be based on clinical grounds  Patients   will be notified of their results via letter from our facility  Accredited by [employer] and FDA       Transcription Location: [facility] 98: [id number]     Risk Value(s):   Tyrer-Cuzick 10 Year: 2 023%, Tyrer-Cuzick Lifetime: 2 023%,    Myriad Table: 1 5%, EDWINA 5 Year: 1 4%, NCI Lifetime: 2 1%   Signed by:   Racquel Rodriguez MD   7/19/16       Discussion/Summary   mammo normal

## 2018-01-22 ENCOUNTER — GENERIC CONVERSION - ENCOUNTER (OUTPATIENT)
Dept: OTHER | Facility: OTHER | Age: 83
End: 2018-01-22

## 2018-04-17 ENCOUNTER — OFFICE VISIT (OUTPATIENT)
Dept: FAMILY MEDICINE CLINIC | Facility: CLINIC | Age: 83
End: 2018-04-17
Payer: MEDICARE

## 2018-04-17 VITALS
SYSTOLIC BLOOD PRESSURE: 140 MMHG | WEIGHT: 211 LBS | HEART RATE: 88 BPM | TEMPERATURE: 97.8 F | DIASTOLIC BLOOD PRESSURE: 80 MMHG | BODY MASS INDEX: 36.02 KG/M2 | HEIGHT: 64 IN

## 2018-04-17 DIAGNOSIS — J40 BRONCHITIS: Primary | ICD-10-CM

## 2018-04-17 DIAGNOSIS — J45.20 MILD INTERMITTENT ASTHMA WITHOUT COMPLICATION: ICD-10-CM

## 2018-04-17 DIAGNOSIS — I10 ESSENTIAL HYPERTENSION: ICD-10-CM

## 2018-04-17 DIAGNOSIS — E78.00 PURE HYPERCHOLESTEROLEMIA: ICD-10-CM

## 2018-04-17 DIAGNOSIS — E55.9 VITAMIN D DEFICIENCY: ICD-10-CM

## 2018-04-17 DIAGNOSIS — IMO0001 UNCONTROLLED TYPE 2 DIABETES MELLITUS WITHOUT COMPLICATION, WITHOUT LONG-TERM CURRENT USE OF INSULIN: ICD-10-CM

## 2018-04-17 PROBLEM — S86.019A ACHILLES TENDON RUPTURE: Status: ACTIVE | Noted: 2017-03-08

## 2018-04-17 PROCEDURE — 99214 OFFICE O/P EST MOD 30 MIN: CPT | Performed by: FAMILY MEDICINE

## 2018-04-17 RX ORDER — ACETAMINOPHEN 500 MG
1000 TABLET ORAL
COMMUNITY
Start: 2016-05-19

## 2018-04-17 RX ORDER — GLIMEPIRIDE 2 MG/1
1 TABLET ORAL DAILY
COMMUNITY
Start: 2016-01-13 | End: 2018-10-22 | Stop reason: ALTCHOICE

## 2018-04-17 RX ORDER — AZITHROMYCIN 250 MG/1
TABLET, FILM COATED ORAL
Qty: 6 TABLET | Refills: 0 | Status: SHIPPED | OUTPATIENT
Start: 2018-04-17 | End: 2018-04-17 | Stop reason: SDUPTHER

## 2018-04-17 RX ORDER — PROMETHAZINE HYDROCHLORIDE 6.25 MG/5ML
SYRUP ORAL
Qty: 120 ML | Refills: 0 | Status: SHIPPED | OUTPATIENT
Start: 2018-04-17 | End: 2018-07-17 | Stop reason: ALTCHOICE

## 2018-04-17 RX ORDER — FLUTICASONE PROPIONATE 50 MCG
1 SPRAY, SUSPENSION (ML) NASAL
COMMUNITY
Start: 2016-05-19

## 2018-04-17 RX ORDER — AZITHROMYCIN 250 MG/1
TABLET, FILM COATED ORAL
Qty: 6 TABLET | Refills: 0 | Status: SHIPPED | OUTPATIENT
Start: 2018-04-17 | End: 2018-04-21

## 2018-04-17 RX ORDER — SIMVASTATIN 10 MG
10 TABLET ORAL DAILY
Refills: 3 | COMMUNITY
Start: 2018-02-19 | End: 2018-06-27 | Stop reason: SDUPTHER

## 2018-04-17 RX ORDER — BLOOD-GLUCOSE METER
EACH MISCELLANEOUS
COMMUNITY
Start: 2016-01-13 | End: 2018-12-05

## 2018-04-17 NOTE — PROGRESS NOTES
Assessment/Plan:    Mrs Radha Hancock is a pleasant and spritely 72-year-old white female who presents today with upper respiratory infection and cough and to review her chronic medical conditions as well  She has not been seen since June 2017  Since I last saw her, she has be seen by ENT for bilateral ear infections, ear drainage and hearing loss  Her symptoms have improved dramatically since then  She is overdue for blood work  1   Acute upper respiratory infection and bronchitis -start Z-Heath  Plain Phenergan for cough  Restart Advair at home  Return to office if symptoms worsen or persist   2   Asthma -restart Advair, as above  3   Uncontrolled diabetes mellitus -labs ordered  Continue metformin and glimepiride for now  4   Hyperlipidemia - continue simvastatin 10 mg daily  Check lipid levels  5   Essential hypertension - blood pressure is stable  Continue benazepril HCT  Diagnoses and all orders for this visit:    Bronchitis  -     CBC  -     Comprehensive metabolic panel  -     Lipid panel  -     T4, free  -     TSH, 3rd generation  -     Vitamin D 25 hydroxy  -     azithromycin (ZITHROMAX) 250 mg tablet; 2 tabs for first day then 1 tab daily for 4 days  -     promethazine (PHENERGAN) 12 5 mg/10 mL syrup; 1-2 tsp po  Every 8 hours as needed for cough    Mild intermittent asthma without complication  -     CBC  -     Comprehensive metabolic panel  -     Lipid panel  -     T4, free  -     TSH, 3rd generation  -     Vitamin D 25 hydroxy  -     fluticasone-salmeterol (ADVAIR DISKUS) 250-50 mcg/dose inhaler;  Inhale 1 puff 2 (two) times a day    Uncontrolled type 2 diabetes mellitus without complication, without long-term current use of insulin ([facility])  -     CBC  -     Comprehensive metabolic panel  -     Lipid panel  -     T4, free  -     TSH, 3rd generation  -     Vitamin D 25 hydroxy    Vitamin D deficiency  -     CBC  -     Comprehensive metabolic panel  -     Lipid panel  -     T4, free  - TSH, 3rd generation  -     Vitamin D 25 hydroxy    Pure hypercholesterolemia  -     CBC  -     Comprehensive metabolic panel  -     Lipid panel  -     T4, free  -     TSH, 3rd generation  -     Vitamin D 25 hydroxy    Essential hypertension  -     CBC  -     Comprehensive metabolic panel  -     Lipid panel  -     T4, free  -     TSH, 3rd generation  -     Vitamin D 25 hydroxy    Other orders  -     acetaminophen (TYLENOL) 500 mg tablet; Take 1,000 mg by mouth  -     Discontinue: fluticasone-salmeterol (ADVAIR DISKUS) 250-50 mcg/dose inhaler; Inhale 2 (two) times a day  -     aspirin 81 MG tablet; Take 81 mg by mouth  -     benazepril-hydrochlorthiazide (LOTENSIN HCT) 10-12 5 MG per tablet; Take 1 tablet by mouth daily  -     metFORMIN (GLUCOPHAGE) 500 mg tablet; Take 1,000 mg by mouth  -     glimepiride (AMARYL) 2 mg tablet; Take 1 tablet by mouth daily  -     Blood Glucose Monitoring Suppl (ONE TOUCH ULTRA 2) w/Device KIT; by Does not apply route  -     Doxylamine-DM (ROBITUSSIN NIGHTTIME COUGH DM) 12 5-30 MG/10ML LIQD; Take by mouth  -     simvastatin (ZOCOR) 10 mg tablet; Take 10 mg by mouth daily As directed  -     fluticasone (FLONASE) 50 mcg/act nasal spray; 1 spray into each nostril    Labs and follow-up in 3 months  Subjective:   Chief complaint:  Pt states that since Friday she has had a productive cough which keeps her up at night   ~cd   Patient ID: Patricia Christie is a 80 y o  female  Pt saw [address] [place] [address] ENT in September because she had sudden hearing loss of both ears with pain  She was diagnosed with ear infection and was given zpack and prednisone  This regimen was repeated  Pain resolved  She then took an anti-viral and her hearing came back gradually  She had drainage from her ears as well  Today she has a cough that started 5 days ago  Congestion as well  She has Advair at home but has not been taking it              The following portions of the patient's history were reviewed and updated as appropriate: allergies, current medications, past family history, past medical history, past social history, past surgical history and problem list     Review of Systems   Constitutional:        See HPI   HENT: Positive for congestion, sinus pain and sinus pressure  Negative for ear pain, mouth sores and trouble swallowing  See HPI  Eyes: Negative for discharge, redness and itching  Respiratory: Negative for apnea, chest tightness, shortness of breath, wheezing and stridor  Cardiovascular: Negative for chest pain, palpitations and leg swelling  Gastrointestinal: Negative for abdominal distention, abdominal pain, blood in stool, constipation, diarrhea, nausea and vomiting  Endocrine: Negative for cold intolerance and heat intolerance  Genitourinary: Negative for difficulty urinating, dysuria, flank pain and urgency  Musculoskeletal: Negative for arthralgias and myalgias  Skin: Negative for rash  Neurological: Negative for dizziness, seizures, syncope, speech difficulty, weakness, light-headedness, numbness and headaches  Hematological: Negative for adenopathy  Psychiatric/Behavioral: Negative for agitation, behavioral problems, confusion and sleep disturbance  The patient is not nervous/anxious  Objective:  Vitals:    04/17/18 1152   BP: 140/80   BP Location: Left arm   Patient Position: Sitting   Cuff Size: Large   Pulse: 88   Temp: 97 8 °F (36 6 °C)   TempSrc: Oral   Weight: 95 7 kg (211 lb)   Height: 5' 4 2" (1 631 m)                Physical Exam   Constitutional: She is oriented to person, place, and time  She appears well-developed and well-nourished  No distress  HENT:   Head: Normocephalic and atraumatic  Right Ear: External ear normal    Left Ear: External ear normal    Mouth/Throat: Oropharyngeal exudate present  Oropharynx has yellowish postnasal drip  TMs are clear bilaterally with good light reflex and landmarks noted     Eyes: Conjunctivae and EOM are normal  Pupils are equal, round, and reactive to light  No scleral icterus  Neck: Normal range of motion  Neck supple  Cardiovascular: Normal rate, regular rhythm, normal heart sounds and intact distal pulses  Exam reveals no gallop and no friction rub  No murmur heard  Pulmonary/Chest: Effort normal  No respiratory distress  She has no wheezes  She has no rales  She exhibits no tenderness  Musculoskeletal: Normal range of motion  She exhibits no edema, tenderness or deformity  Ambulates with cane for added stability  Lymphadenopathy:     She has no cervical adenopathy  Neurological: She is alert and oriented to person, place, and time  She has normal reflexes  Skin: Skin is warm and dry  She is not diaphoretic  Psychiatric: She has a normal mood and affect   Her behavior is normal  Judgment and thought content normal

## 2018-04-23 ENCOUNTER — TELEPHONE (OUTPATIENT)
Dept: FAMILY MEDICINE CLINIC | Facility: CLINIC | Age: 83
End: 2018-04-23

## 2018-04-23 NOTE — TELEPHONE ENCOUNTER
Ally Watson called in and said that she has finished her antibiotic and she is now having green discharge from her nose as well as ear pain  She wants to know if you would like to prescribe something else or if she needs to come in  She also wanted to let you know that she went to a colorectal doctor and they have put her on Metamucil daily  She uses [facility]

## 2018-04-23 NOTE — TELEPHONE ENCOUNTER
Please notify patient that I would like her see the ENT if possible because of her recent ear problems and hearing loss  Let me know if she has a problem getting in asap  I cannot find the notes from her ENT - I think it was [facility]    Thank you,  CB

## 2018-05-15 LAB
LEFT EYE DIABETIC RETINOPATHY: NORMAL
RIGHT EYE DIABETIC RETINOPATHY: NORMAL

## 2018-06-27 DIAGNOSIS — E78.00 PURE HYPERCHOLESTEROLEMIA: Primary | ICD-10-CM

## 2018-06-27 RX ORDER — SIMVASTATIN 10 MG
TABLET ORAL
Qty: 90 TABLET | Refills: 3 | Status: SHIPPED | OUTPATIENT
Start: 2018-06-27 | End: 2019-06-13 | Stop reason: SDUPTHER

## 2018-07-05 ENCOUNTER — APPOINTMENT (OUTPATIENT)
Dept: LAB | Facility: CLINIC | Age: 83
End: 2018-07-05
Payer: MEDICARE

## 2018-07-05 LAB
25(OH)D3 SERPL-MCNC: 19 NG/ML (ref 30–100)
ALBUMIN SERPL BCP-MCNC: 3.4 G/DL (ref 3.5–5)
ALP SERPL-CCNC: 77 U/L (ref 46–116)
ALT SERPL W P-5'-P-CCNC: 19 U/L (ref 12–78)
ANION GAP SERPL CALCULATED.3IONS-SCNC: 4 MMOL/L (ref 4–13)
AST SERPL W P-5'-P-CCNC: 15 U/L (ref 5–45)
BILIRUB SERPL-MCNC: 0.75 MG/DL (ref 0.2–1)
BUN SERPL-MCNC: 16 MG/DL (ref 5–25)
CALCIUM SERPL-MCNC: 9.1 MG/DL (ref 8.3–10.1)
CHLORIDE SERPL-SCNC: 102 MMOL/L (ref 100–108)
CHOLEST SERPL-MCNC: 145 MG/DL (ref 50–200)
CO2 SERPL-SCNC: 31 MMOL/L (ref 21–32)
CREAT SERPL-MCNC: 0.94 MG/DL (ref 0.6–1.3)
ERYTHROCYTE [DISTWIDTH] IN BLOOD BY AUTOMATED COUNT: 13.1 % (ref 11.6–15.1)
GFR SERPL CREATININE-BSD FRML MDRD: 56 ML/MIN/1.73SQ M
GLUCOSE P FAST SERPL-MCNC: 178 MG/DL (ref 65–99)
HCT VFR BLD AUTO: 44.5 % (ref 34.8–46.1)
HDLC SERPL-MCNC: 55 MG/DL (ref 40–60)
HGB BLD-MCNC: 13.9 G/DL (ref 11.5–15.4)
LDLC SERPL CALC-MCNC: 60 MG/DL (ref 0–100)
MCH RBC QN AUTO: 28.7 PG (ref 26.8–34.3)
MCHC RBC AUTO-ENTMCNC: 31.2 G/DL (ref 31.4–37.4)
MCV RBC AUTO: 92 FL (ref 82–98)
NONHDLC SERPL-MCNC: 90 MG/DL
PLATELET # BLD AUTO: 273 THOUSANDS/UL (ref 149–390)
PMV BLD AUTO: 10.2 FL (ref 8.9–12.7)
POTASSIUM SERPL-SCNC: 4.3 MMOL/L (ref 3.5–5.3)
PROT SERPL-MCNC: 6.8 G/DL (ref 6.4–8.2)
RBC # BLD AUTO: 4.85 MILLION/UL (ref 3.81–5.12)
SODIUM SERPL-SCNC: 137 MMOL/L (ref 136–145)
T4 FREE SERPL-MCNC: 1.07 NG/DL (ref 0.76–1.46)
TRIGL SERPL-MCNC: 148 MG/DL
TSH SERPL DL<=0.05 MIU/L-ACNC: 1.45 UIU/ML (ref 0.36–3.74)
WBC # BLD AUTO: 10.55 THOUSAND/UL (ref 4.31–10.16)

## 2018-07-05 PROCEDURE — 84443 ASSAY THYROID STIM HORMONE: CPT | Performed by: FAMILY MEDICINE

## 2018-07-05 PROCEDURE — 85027 COMPLETE CBC AUTOMATED: CPT | Performed by: FAMILY MEDICINE

## 2018-07-05 PROCEDURE — 82306 VITAMIN D 25 HYDROXY: CPT | Performed by: FAMILY MEDICINE

## 2018-07-05 PROCEDURE — 36415 COLL VENOUS BLD VENIPUNCTURE: CPT | Performed by: FAMILY MEDICINE

## 2018-07-05 PROCEDURE — 80053 COMPREHEN METABOLIC PANEL: CPT | Performed by: FAMILY MEDICINE

## 2018-07-05 PROCEDURE — 84439 ASSAY OF FREE THYROXINE: CPT | Performed by: FAMILY MEDICINE

## 2018-07-05 PROCEDURE — 80061 LIPID PANEL: CPT | Performed by: FAMILY MEDICINE

## 2018-07-16 PROBLEM — S86.019A ACHILLES TENDON RUPTURE: Status: RESOLVED | Noted: 2017-03-08 | Resolved: 2018-07-16

## 2018-07-17 ENCOUNTER — OFFICE VISIT (OUTPATIENT)
Dept: FAMILY MEDICINE CLINIC | Facility: CLINIC | Age: 83
End: 2018-07-17
Payer: MEDICARE

## 2018-07-17 VITALS
HEIGHT: 64 IN | SYSTOLIC BLOOD PRESSURE: 118 MMHG | HEART RATE: 68 BPM | BODY MASS INDEX: 36.43 KG/M2 | WEIGHT: 213.4 LBS | DIASTOLIC BLOOD PRESSURE: 68 MMHG

## 2018-07-17 DIAGNOSIS — R19.7 DIARRHEA, UNSPECIFIED TYPE: ICD-10-CM

## 2018-07-17 DIAGNOSIS — Z12.31 ENCOUNTER FOR SCREENING MAMMOGRAM FOR BREAST CANCER: ICD-10-CM

## 2018-07-17 DIAGNOSIS — N32.81 OAB (OVERACTIVE BLADDER): ICD-10-CM

## 2018-07-17 DIAGNOSIS — I10 ESSENTIAL HYPERTENSION: ICD-10-CM

## 2018-07-17 DIAGNOSIS — E78.00 PURE HYPERCHOLESTEROLEMIA: ICD-10-CM

## 2018-07-17 DIAGNOSIS — M19.91 PRIMARY OSTEOARTHRITIS, UNSPECIFIED SITE: ICD-10-CM

## 2018-07-17 DIAGNOSIS — IMO0001 UNCONTROLLED TYPE 2 DIABETES MELLITUS WITHOUT COMPLICATION, WITHOUT LONG-TERM CURRENT USE OF INSULIN: Primary | ICD-10-CM

## 2018-07-17 DIAGNOSIS — Z13.820 ENCOUNTER FOR SCREENING FOR OSTEOPOROSIS: ICD-10-CM

## 2018-07-17 DIAGNOSIS — E55.9 VITAMIN D DEFICIENCY: ICD-10-CM

## 2018-07-17 LAB
CREAT UR-MCNC: 146 MG/DL
MICROALBUMIN UR-MCNC: 16.3 MG/L (ref 0–20)
MICROALBUMIN/CREAT 24H UR: 11 MG/G CREATININE (ref 0–30)
SL AMB POCT HEMOGLOBIN AIC: 9.6

## 2018-07-17 PROCEDURE — 99215 OFFICE O/P EST HI 40 MIN: CPT | Performed by: FAMILY MEDICINE

## 2018-07-17 PROCEDURE — 82570 ASSAY OF URINE CREATININE: CPT | Performed by: FAMILY MEDICINE

## 2018-07-17 PROCEDURE — 82043 UR ALBUMIN QUANTITATIVE: CPT | Performed by: FAMILY MEDICINE

## 2018-07-17 PROCEDURE — 83036 HEMOGLOBIN GLYCOSYLATED A1C: CPT | Performed by: FAMILY MEDICINE

## 2018-07-17 NOTE — PATIENT INSTRUCTIONS
Please remember to take your Vitamin D3 supplement daily: 2000 units with food  Watch sugar and carbs  Try to check your sugars more frequently  See me in 3 months

## 2018-07-17 NOTE — PROGRESS NOTES
Assessment/Plan:  Pleasant and youthful 45-year-old white female presents today to follow-up on her chronic medical conditions and to review her blood work  She has intermittent explosive diarrhea and finds that the metformin makes it worse  Therefore, she is not compliant about taking it twice a day and usually only uses it once a day  As a result, her sugars are uncontrolled  1   Uncontrolled diabetes mellitus type 2 -, she is not using metformin twice a day due to diarrhea  She remains on Amaryl 2 mg once daily  We had a long discussion about future options, as her hemoglobin A1c is 9 6, and she does not check her sugars routinely at home  She will try Invokana if her insurance covers it  If not, I explained that basal insulin will be the next step  If this is the case, she will come in for a nurse tutorial on starting insulin  She will call to let me neeraj if the [facility] Barnettid is covered  She realizes that if she cannot tolerate Metformin BID, then additional meds are required due to the uncontrolled nature of her sugars  2   Hypertension -blood pressure is well controlled  Continue benazepril HCT  3   Hyperlipidemia -lipids are stable  Continue simvastatin 10 mg daily  4   Overactive bladder -meds did help her, although she experience side effects  She is no longer using any medication  5  Intermittent diarrhea -this is chronic and intermittent  She is seen by Colorectal surgery and her colonoscopy is up-to-date  6   Vitamin-D deficiency-continue over-the-counter vitamin D3 supplementation  7   Osteoarthritis - she uses Tylenol as needed  Knee pain is well controlled at the present time  8   Healthcare maintenance -DEXA scan and mammogram ordered  F/U 3 months  Subjective: Follow up re: DM2, HTN, Hyperlipidemia,and review labs  Also pt had diarrhea after getting her Shingrix injection  kw     Patient ID: Mariella Libman is a 80 y o  female      F/U on chronic medical conditions and labs  A1C is 9 6  She does not check her sugars much at home  She admits that she does not use metformin twice a day due to diarrhea  The following portions of the patient's history were reviewed and updated as appropriate: allergies, current medications, past family history, past medical history, past social history, past surgical history and problem list     Review of Systems   Constitutional:        See HPI   HENT: Negative for congestion, ear pain, mouth sores, sinus pressure and trouble swallowing  Eyes: Negative for discharge, redness and itching  Respiratory: Negative for apnea, cough, chest tightness, shortness of breath, wheezing and stridor  Cardiovascular: Negative for chest pain, palpitations and leg swelling  Gastrointestinal: Negative for abdominal distention, abdominal pain, blood in stool, constipation, diarrhea, nausea and vomiting  Endocrine: Negative for cold intolerance and heat intolerance  Admits that she does not use her metformin twice a day, especially when she is having diarrhea  She usually uses it once a day in the morning  Genitourinary: Negative for difficulty urinating, dysuria, flank pain and urgency  Musculoskeletal: Negative for arthralgias and myalgias  Skin: Negative for rash  Neurological: Negative for dizziness, seizures, syncope, speech difficulty, weakness, light-headedness, numbness and headaches  Hematological: Negative for adenopathy  Psychiatric/Behavioral: Negative for agitation, behavioral problems, confusion and sleep disturbance  The patient is not nervous/anxious  Objective:      /68 (BP Location: Left arm)   Pulse 68   Ht 5' 4 2" (1 631 m)   Wt 96 8 kg (213 lb 6 4 oz)   BMI 36 40 kg/m²          Physical Exam   Constitutional: She is oriented to person, place, and time  She appears well-developed and well-nourished  No distress  HENT:   Head: Normocephalic and atraumatic     Right Ear: External ear normal    Left Ear: External ear normal    Eyes: Conjunctivae and EOM are normal  Pupils are equal, round, and reactive to light  No scleral icterus  Neck: Normal range of motion  Neck supple  Cardiovascular: Normal rate, regular rhythm, normal heart sounds and intact distal pulses  Exam reveals no gallop and no friction rub  No murmur heard  Pulses:       Dorsalis pedis pulses are 2+ on the right side, and 2+ on the left side  Posterior tibial pulses are 2+ on the right side, and 2+ on the left side  Pulmonary/Chest: Effort normal  No respiratory distress  She has no wheezes  She has no rales  She exhibits no tenderness  Musculoskeletal: Normal range of motion  She exhibits no edema, tenderness or deformity  Feet:   Right Foot:   Skin Integrity: Negative for ulcer, skin breakdown, erythema, warmth, callus or dry skin  Left Foot:   Skin Integrity: Negative for ulcer, skin breakdown, erythema, warmth, callus or dry skin  Lymphadenopathy:     She has no cervical adenopathy  Neurological: She is alert and oriented to person, place, and time  She has normal reflexes  Skin: Skin is warm and dry  She is not diaphoretic  Psychiatric: She has a normal mood and affect  Her behavior is normal  Judgment and thought content normal      Right Foot/Ankle   Right Foot Inspection  Skin Exam: skin normal and skin intact no dry skin, no warmth, no callus, no erythema, no maceration, no abnormal color, no pre-ulcer, no ulcer and no callus                          Toe Exam: ROM and strength within normal limits  Sensory     Proprioception: intact   Monofilament testing: intact  Vascular  Capillary refills: < 3 seconds  The right DP pulse is 2+  The right PT pulse is 2+       Left Foot/Ankle  Left Foot Inspection  Skin Exam: skin normal and skin intactno dry skin, no warmth, no erythema, no maceration, normal color, no pre-ulcer, no ulcer and no callus                         Toe Exam: ROM and strength within normal limits                   Sensory     Proprioception: intact  Monofilament: intact  Vascular  Capillary refills: < 3 seconds  The left DP pulse is 2+  The left PT pulse is 2+

## 2018-07-25 DIAGNOSIS — I10 ESSENTIAL HYPERTENSION: Primary | ICD-10-CM

## 2018-09-14 ENCOUNTER — HOSPITAL ENCOUNTER (OUTPATIENT)
Dept: MAMMOGRAPHY | Facility: MEDICAL CENTER | Age: 83
Discharge: HOME | End: 2018-09-14
Payer: MEDICARE

## 2018-09-14 ENCOUNTER — HOSPITAL ENCOUNTER (OUTPATIENT)
Dept: BONE DENSITY | Facility: MEDICAL CENTER | Age: 83
Discharge: HOME | End: 2018-09-14
Payer: MEDICARE

## 2018-09-14 DIAGNOSIS — Z12.31 ENCOUNTER FOR SCREENING MAMMOGRAM FOR BREAST CANCER: ICD-10-CM

## 2018-09-14 DIAGNOSIS — Z13.820 ENCOUNTER FOR SCREENING FOR OSTEOPOROSIS: ICD-10-CM

## 2018-09-14 PROCEDURE — 77067 SCR MAMMO BI INCL CAD: CPT

## 2018-09-14 PROCEDURE — 77063 BREAST TOMOSYNTHESIS BI: CPT

## 2018-09-14 PROCEDURE — 77080 DXA BONE DENSITY AXIAL: CPT

## 2018-10-05 ENCOUNTER — TELEPHONE (OUTPATIENT)
Dept: FAMILY MEDICINE CLINIC | Facility: CLINIC | Age: 83
End: 2018-10-05

## 2018-10-05 NOTE — TELEPHONE ENCOUNTER
PT CALLED AND IS ASKING IF SHE NEEDS B/W BEFORE HER NEXT DOCTOR APPT WITH DR Moe Bledsoe, WHICH IS ON 10/16/18? PLEASE LET PT KNOW EITHER WAY  THANK YOU

## 2018-10-05 NOTE — TELEPHONE ENCOUNTER
Please notify patient that she does not need any BW  I will be checking a Hgb A1C in the office at her next appt    Thank you,  CB

## 2018-10-16 ENCOUNTER — OFFICE VISIT (OUTPATIENT)
Dept: FAMILY MEDICINE CLINIC | Facility: CLINIC | Age: 83
End: 2018-10-16
Payer: MEDICARE

## 2018-10-16 VITALS
BODY MASS INDEX: 35.65 KG/M2 | WEIGHT: 208.8 LBS | DIASTOLIC BLOOD PRESSURE: 78 MMHG | SYSTOLIC BLOOD PRESSURE: 120 MMHG | HEART RATE: 64 BPM | HEIGHT: 64 IN

## 2018-10-16 DIAGNOSIS — N32.81 OAB (OVERACTIVE BLADDER): ICD-10-CM

## 2018-10-16 DIAGNOSIS — E11.65 UNCONTROLLED TYPE 2 DIABETES MELLITUS WITH HYPERGLYCEMIA (HCC): Primary | ICD-10-CM

## 2018-10-16 DIAGNOSIS — I10 ESSENTIAL HYPERTENSION: ICD-10-CM

## 2018-10-16 DIAGNOSIS — IMO0001 UNCONTROLLED TYPE 2 DIABETES MELLITUS WITHOUT COMPLICATION, WITHOUT LONG-TERM CURRENT USE OF INSULIN: ICD-10-CM

## 2018-10-16 DIAGNOSIS — E78.00 PURE HYPERCHOLESTEROLEMIA: ICD-10-CM

## 2018-10-16 LAB — SL AMB POCT HEMOGLOBIN AIC: ABNORMAL

## 2018-10-16 PROCEDURE — 99214 OFFICE O/P EST MOD 30 MIN: CPT | Performed by: FAMILY MEDICINE

## 2018-10-16 PROCEDURE — 83036 HEMOGLOBIN GLYCOSYLATED A1C: CPT | Performed by: FAMILY MEDICINE

## 2018-10-16 NOTE — PROGRESS NOTES
Assessment/Plan:    Pleasant 28-year-old white female presents today to follow-up on her chronic medical conditions  1   Diabetes mellitus uncontrolled - she has started invokana, but mistakenly stopped her metformin and amaryl  A1C today is 9 8  She will restart metformin and amaryl, and continue with Invokana  F/U in 3 months to repeat A1C and pt to start checking her sugars at home on a regular basis      Insurance will stop paying for Invokana at the end of the year, so may switch to [place] if necessary  If her A1c does not improve on this combination, she will need Insulin therapy  She voices understanding  2   Hypertension - stable  Continue benazepril HCT    3  Hyperlipidemia - stable on simvastatin  4   Overactive bladder - not using any meds, they made her dizzy  Subjective:pt is here for a follow up of her diabetes  She was offered a flu vaccine but is unable to because she is getting her allergy shots today~cd      Patient ID: Angel Sampson is a 80 y o  female  Diabetes mellitus - uncontrolled  Her insurance did pay for Invokana but apparently will stopping for it at the end of the year  Patient mistakenly stopped taking metformin and Amaryl  Needs hemoglobin A1c today  Needs flu shot today  Taking metamucil and bowels have been much better  The following portions of the patient's history were reviewed and updated as appropriate: allergies, current medications, past family history, past medical history, past social history, past surgical history and problem list     Review of Systems   Constitutional:        See HPI   HENT: Negative for congestion, ear pain, mouth sores, sinus pressure and trouble swallowing  Eyes: Negative for discharge, redness and itching  Respiratory: Negative for apnea, cough, chest tightness, shortness of breath, wheezing and stridor  Cardiovascular: Negative for chest pain, palpitations and leg swelling     Gastrointestinal: Negative for abdominal distention, abdominal pain, blood in stool, constipation, diarrhea, nausea and vomiting  Endocrine: Negative for cold intolerance and heat intolerance  See HPI  Genitourinary: Negative for difficulty urinating, dysuria, flank pain and urgency  Musculoskeletal: Negative for arthralgias and myalgias  Skin: Negative for rash  Neurological: Negative for dizziness, seizures, syncope, speech difficulty, weakness, light-headedness, numbness and headaches  Hematological: Negative for adenopathy  Psychiatric/Behavioral: Negative for agitation, behavioral problems, confusion and sleep disturbance  The patient is not nervous/anxious  Objective:  Vitals:    10/16/18 0949   BP: 120/78   BP Location: Left arm   Patient Position: Sitting   Cuff Size: Large   Pulse: 64   Weight: 94 7 kg (208 lb 12 8 oz)   Height: 5' 4 2" (1 631 m)                Physical Exam   Constitutional: She is oriented to person, place, and time  She appears well-developed and well-nourished  No distress  HENT:   Head: Normocephalic and atraumatic  Right Ear: External ear normal    Left Ear: External ear normal    Eyes: Pupils are equal, round, and reactive to light  Conjunctivae and EOM are normal  No scleral icterus  Neck: Normal range of motion  Neck supple  Cardiovascular: Normal rate, regular rhythm, normal heart sounds and intact distal pulses  Exam reveals no gallop and no friction rub  No murmur heard  Pulses:       Dorsalis pedis pulses are 2+ on the right side, and 2+ on the left side  Posterior tibial pulses are 2+ on the right side, and 2+ on the left side  Pulmonary/Chest: Effort normal  No respiratory distress  She has no wheezes  She has no rales  She exhibits no tenderness  Musculoskeletal: Normal range of motion  She exhibits no edema, tenderness or deformity  Feet:   Right Foot:   Skin Integrity: Negative for ulcer, skin breakdown, erythema, warmth, callus or dry skin  Left Foot:   Skin Integrity: Negative for ulcer, skin breakdown, erythema, warmth, callus or dry skin  Lymphadenopathy:     She has no cervical adenopathy  Neurological: She is alert and oriented to person, place, and time  She has normal reflexes  Skin: Skin is warm and dry  She is not diaphoretic  Psychiatric: She has a normal mood and affect  Her behavior is normal  Judgment and thought content normal          Patient's shoes and socks were not removed  Right Foot/Ankle   Right Foot Inspection  Skin Exam: skin normal and skin intact no dry skin, no warmth, no callus, no erythema, no maceration, no abnormal color, no pre-ulcer, no ulcer and no callus                          Toe Exam: ROM and strength within normal limits  Sensory     Proprioception: intact   Monofilament testing: intact  Vascular  Capillary refills: < 3 seconds  The right DP pulse is 2+  The right PT pulse is 2+  Left Foot/Ankle  Left Foot Inspection  Skin Exam: skin normal and skin intactno dry skin, no warmth, no erythema, no maceration, normal color, no pre-ulcer, no ulcer and no callus                         Toe Exam: ROM and strength within normal limits                   Sensory     Proprioception: intact  Monofilament: intact  Vascular  Capillary refills: < 3 seconds  The left DP pulse is 2+  The left PT pulse is 2+  Assign Risk Category:  No deformity present;  No loss of protective sensation;        Risk: 0

## 2018-10-17 ENCOUNTER — TELEPHONE (OUTPATIENT)
Dept: FAMILY MEDICINE CLINIC | Facility: CLINIC | Age: 83
End: 2018-10-17

## 2018-10-17 NOTE — TELEPHONE ENCOUNTER
Patient states that she has been thinking a lot about the options for medication after yesterday's appointment  She said she would like to go with the option CB suggested about doing the low dose insulin shot before bed time  She asked if we could proceed with this & that if anything would be called in, it is to NOT go to the [facility] anymore but to the [facility] on [address]  Thank you!

## 2018-10-18 ENCOUNTER — TELEPHONE (OUTPATIENT)
Dept: FAMILY MEDICINE CLINIC | Facility: CLINIC | Age: 83
End: 2018-10-18

## 2018-10-18 DIAGNOSIS — E11.65 UNCONTROLLED TYPE 2 DIABETES MELLITUS WITH HYPERGLYCEMIA (HCC): Primary | ICD-10-CM

## 2018-10-18 NOTE — TELEPHONE ENCOUNTER
Received phone call from patient stating that she would like to try long acting insulin  I have ordered Levemir 10 u in the evening every day, along with her pen needles, to [facility]  Patient will need a nursing visit to arrange for insulin education and instruction  When she starts her insulin, she should STOP glimeperide, as it may cause hypoglycemia  She should also stop invokana, for the same reason  I would like to see her in 4-8 weeks to review  Please call patient to set up these appts      Thank you,    CB

## 2018-10-18 NOTE — TELEPHONE ENCOUNTER
I spoke with pt and she is aware Levemir has been ordered  Appt set for insulin training on 10/22/18 with me  Will give instructions to stop glimeperide and invokana at that time  --bb

## 2018-10-22 ENCOUNTER — CLINICAL SUPPORT (OUTPATIENT)
Dept: FAMILY MEDICINE CLINIC | Facility: CLINIC | Age: 83
End: 2018-10-22

## 2018-10-22 DIAGNOSIS — E11.8 TYPE 2 DIABETES MELLITUS WITH COMPLICATION, WITHOUT LONG-TERM CURRENT USE OF INSULIN (HCC): Primary | ICD-10-CM

## 2018-10-22 RX ORDER — ANTIOX #8/OM3/DHA/EPA/LUT/ZEAX 250-2.5 MG
1 CAPSULE ORAL DAILY
COMMUNITY
End: 2020-01-08 | Stop reason: ALTCHOICE

## 2018-10-22 NOTE — PROGRESS NOTES
Pt presents for insulin pen training  Lantus 10 units at HS has been ordered by CB  Demonstrated proper use of insulin pen, including putting on needle, priming before each use, dialing up correct dose, and injecting into acceptable sites on thighs, abdomen and upper arms  She understands the importance of rotating injection sites  Pt was able to return demonstrate usage and verbalized confidence in her ability to start insulin pen tonight  She was instructed to stop glimeperide and invokana  She is to continue her metformin as ordered  Pt knows she can contact me with any problems or questions  She will keep a BS log with a fasting glucose every morning and will also try to do a glucose reading 2 hrs after her last meal   She will return to office with appt with CB in 4-8 weeks  --bb

## 2018-10-29 ENCOUNTER — TELEPHONE (OUTPATIENT)
Dept: FAMILY MEDICINE CLINIC | Facility: CLINIC | Age: 83
End: 2018-10-29

## 2018-10-29 ENCOUNTER — CLINICAL SUPPORT (OUTPATIENT)
Dept: FAMILY MEDICINE CLINIC | Facility: CLINIC | Age: 83
End: 2018-10-29

## 2018-10-29 DIAGNOSIS — E11.8 TYPE 2 DIABETES MELLITUS WITH COMPLICATION, WITHOUT LONG-TERM CURRENT USE OF INSULIN (HCC): Primary | ICD-10-CM

## 2018-10-29 NOTE — PROGRESS NOTES
Pt presents today because she does not understand how to use her glucometer  She started on Lantus pen last week but has not been checking her blood sugars  Demonstrated proper use of glucometer including insertion of strips, use of lancets and applying blood drop to strip  Pt instructed to rotate testing sites  Pt able to return demonstrate and states she will start doing FBS tomorrow  She will keep a log and bring to next appt with CB  Pt aware she can call with any problems  --bb

## 2018-11-01 DIAGNOSIS — E11.8 TYPE 2 DIABETES MELLITUS WITH COMPLICATION, UNSPECIFIED WHETHER LONG TERM INSULIN USE: Primary | ICD-10-CM

## 2018-12-05 ENCOUNTER — OFFICE VISIT (OUTPATIENT)
Dept: FAMILY MEDICINE CLINIC | Facility: CLINIC | Age: 83
End: 2018-12-05
Payer: MEDICARE

## 2018-12-05 VITALS
SYSTOLIC BLOOD PRESSURE: 154 MMHG | HEART RATE: 80 BPM | HEIGHT: 64 IN | WEIGHT: 210 LBS | DIASTOLIC BLOOD PRESSURE: 74 MMHG | BODY MASS INDEX: 35.85 KG/M2

## 2018-12-05 DIAGNOSIS — M25.531 BILATERAL WRIST PAIN: ICD-10-CM

## 2018-12-05 DIAGNOSIS — M25.532 BILATERAL WRIST PAIN: ICD-10-CM

## 2018-12-05 DIAGNOSIS — E11.65 UNCONTROLLED TYPE 2 DIABETES MELLITUS WITH HYPERGLYCEMIA (HCC): Primary | ICD-10-CM

## 2018-12-05 DIAGNOSIS — E55.9 VITAMIN D DEFICIENCY: ICD-10-CM

## 2018-12-05 DIAGNOSIS — I10 ESSENTIAL HYPERTENSION: ICD-10-CM

## 2018-12-05 PROCEDURE — 99214 OFFICE O/P EST MOD 30 MIN: CPT | Performed by: FAMILY MEDICINE

## 2018-12-05 RX ORDER — LANCETS
EACH MISCELLANEOUS
Qty: 100 EACH | Refills: 3 | Status: SHIPPED | OUTPATIENT
Start: 2018-12-05 | End: 2018-12-05 | Stop reason: SDUPTHER

## 2018-12-05 RX ORDER — INSULIN GLARGINE 100 [IU]/ML
20 INJECTION, SOLUTION SUBCUTANEOUS
Qty: 10 ML | Refills: 0 | Status: SHIPPED | OUTPATIENT
Start: 2018-12-05 | End: 2018-12-05 | Stop reason: CLARIF

## 2018-12-05 NOTE — PROGRESS NOTES
Assessment/Plan:  Pt presents to f/u on her uncontrolled DM  Since last visit, she has started Lantus 10u Qhs  She has been doing a great job with her sugar diary and has transitioned very well  She has had no hypoglycemic attacks  She remains on Metformin 1000mg daily  Diabetes mellitus type 2, uncontrolled ([address] )  Lab Results   Component Value Date    HGBA1C 9 8% 10/16/2018       No results for input(s): POCGLU in the last 72 hours  Blood Sugar Average: Last 72 hrs:    Recently started insulin therapy - lantus 10u qHS  Increase Lantus by 2 u weekly, to a total of 20u qHS  F/U with me in 2 months plus BW  Hypertension  Stable on benzapril HCT  Vit D Def -  Vit D is 19  Start Drisdol 3 times per week for 8 weeks  Repeat in 2 months  Bilateral wrist pain  Has been doing a lot of thania cooking and baking cookies  Overuse syndrome vs tendonitis vs DJD  Pt cannot take NSAIDS  She is instructed to use a soft velcro brace and ice her wrists daily  Avoid overuse  Notify me if symptoms worsen or persist     F/U 2 months  Subjective: patient is here for a f/u to starting Lantus  Patient's sugars are ranging between 150-200   ak     Patient ID: Livia Stewart is a 80 y o  female  Since her last visit, patient has started Lantus  She is currently on 10 units at bedtime  Remains on metformin 1000mg daily  She has too much diarrhea when she increases her doses  AM sugars 150-200    R wrist pain and shoulder pain for past 6 weeks  The following portions of the patient's history were reviewed and updated as appropriate: allergies, current medications, past family history, past medical history, past social history, past surgical history and problem list     Review of Systems   Constitutional:        See HPI   HENT: Negative for congestion, ear pain, mouth sores, sinus pressure and trouble swallowing  Eyes: Negative for discharge, redness and itching     Respiratory: Negative for apnea, cough, chest tightness, shortness of breath, wheezing and stridor  Cardiovascular: Negative for chest pain, palpitations and leg swelling  Gastrointestinal: Negative for abdominal distention, abdominal pain, blood in stool, constipation, diarrhea, nausea and vomiting  Endocrine: Negative for cold intolerance and heat intolerance  See HPI  Genitourinary: Negative for difficulty urinating, dysuria, flank pain and urgency  Musculoskeletal: Negative for arthralgias and myalgias  See HPI  Skin: Negative for rash  Neurological: Negative for dizziness, seizures, syncope, speech difficulty, weakness, light-headedness, numbness and headaches  Hematological: Negative for adenopathy  Psychiatric/Behavioral: Negative for agitation, behavioral problems, confusion and sleep disturbance  The patient is not nervous/anxious  Objective:    /74   Pulse 80   Ht 5' 4" (1 626 m)   Wt 95 3 kg (210 lb)   LMP  (LMP Unknown)   Breastfeeding? No   BMI 36 05 kg/m²            Physical Exam   Constitutional: She is oriented to person, place, and time  She appears well-developed and well-nourished  No distress  HENT:   Head: Normocephalic and atraumatic  Right Ear: External ear normal    Left Ear: External ear normal    Eyes: Pupils are equal, round, and reactive to light  Conjunctivae and EOM are normal  No scleral icterus  Neck: Normal range of motion  Neck supple  Cardiovascular: Normal rate, regular rhythm, normal heart sounds and intact distal pulses  Exam reveals no gallop and no friction rub  No murmur heard  Pulmonary/Chest: Effort normal  No respiratory distress  She has no wheezes  She has no rales  She exhibits no tenderness  Musculoskeletal: Normal range of motion  She exhibits no edema, tenderness or deformity  Tender over medical aspect of both wrists, at "snuff box "  Motor and sensory intact  DTR intact bilaterally     Lymphadenopathy:     She has no cervical adenopathy  Neurological: She is alert and oriented to person, place, and time  She has normal reflexes  Skin: Skin is warm and dry  She is not diaphoretic  Psychiatric: She has a normal mood and affect   Her behavior is normal  Judgment and thought content normal      Diabetic Foot Exam

## 2018-12-05 NOTE — ASSESSMENT & PLAN NOTE
Lab Results   Component Value Date    HGBA1C 9 8% 10/16/2018       No results for input(s): POCGLU in the last 72 hours  Blood Sugar Average: Last 72 hrs:    Recently started insulin therapy - lantus 10u qHS

## 2018-12-05 NOTE — PATIENT INSTRUCTIONS
Keep up the good work with your diabetes Pen and insulin! Please increase from 10 units, 2 units each week, up to a total of 20 units daily  Continue checking  sugars in the morning  And now and then,  check your sugar in the middle of the day to see how it is running  See me in 2-3 months  Labwork before the next appt

## 2018-12-06 RX ORDER — ERGOCALCIFEROL 1.25 MG/1
50000 CAPSULE ORAL 3 TIMES WEEKLY
Qty: 24 CAPSULE | Refills: 0 | Status: SHIPPED | OUTPATIENT
Start: 2018-12-07 | End: 2019-03-12

## 2018-12-06 RX ORDER — LANCETS
EACH MISCELLANEOUS
Qty: 100 EACH | Refills: 5 | Status: SHIPPED | OUTPATIENT
Start: 2018-12-06 | End: 2020-01-25

## 2018-12-07 ENCOUNTER — TELEPHONE (OUTPATIENT)
Dept: FAMILY MEDICINE CLINIC | Facility: CLINIC | Age: 83
End: 2018-12-07

## 2018-12-07 NOTE — TELEPHONE ENCOUNTER
----- Message from Ricco Jules MD sent at 12/6/2018 10:12 PM EST -----  Regarding: Vit D Def  I saw patient this week to review her DM and her initiation of Insulin  Can you please call patient to let her know that I did not have a chance to discuss her Vit D level, which was very low at 19  I would like her to start taking Drisdol 1 tab 3 times a week for 8 weeks  The prescription was sent to [facility]  Her Vit D will be rechecked with her next BW      Thank you,  CB

## 2019-02-06 ENCOUNTER — APPOINTMENT (OUTPATIENT)
Dept: LAB | Facility: CLINIC | Age: 84
End: 2019-02-06
Payer: MEDICARE

## 2019-02-06 DIAGNOSIS — I10 ESSENTIAL HYPERTENSION: ICD-10-CM

## 2019-02-06 DIAGNOSIS — E11.65 UNCONTROLLED TYPE 2 DIABETES MELLITUS WITH HYPERGLYCEMIA (HCC): ICD-10-CM

## 2019-02-06 DIAGNOSIS — E55.9 VITAMIN D DEFICIENCY: ICD-10-CM

## 2019-02-06 LAB
25(OH)D3 SERPL-MCNC: 112.2 NG/ML (ref 30–100)
ALBUMIN SERPL BCP-MCNC: 3.3 G/DL (ref 3.5–5)
ALP SERPL-CCNC: 83 U/L (ref 46–116)
ALT SERPL W P-5'-P-CCNC: 15 U/L (ref 12–78)
ANION GAP SERPL CALCULATED.3IONS-SCNC: 9 MMOL/L (ref 4–13)
AST SERPL W P-5'-P-CCNC: 14 U/L (ref 5–45)
BILIRUB SERPL-MCNC: 0.71 MG/DL (ref 0.2–1)
BUN SERPL-MCNC: 21 MG/DL (ref 5–25)
CALCIUM SERPL-MCNC: 8.9 MG/DL (ref 8.3–10.1)
CHLORIDE SERPL-SCNC: 102 MMOL/L (ref 100–108)
CO2 SERPL-SCNC: 27 MMOL/L (ref 21–32)
CREAT SERPL-MCNC: 0.82 MG/DL (ref 0.6–1.3)
EST. AVERAGE GLUCOSE BLD GHB EST-MCNC: 174 MG/DL
GFR SERPL CREATININE-BSD FRML MDRD: 66 ML/MIN/1.73SQ M
GLUCOSE P FAST SERPL-MCNC: 138 MG/DL (ref 65–99)
HBA1C MFR BLD: 7.7 % (ref 4.2–6.3)
POTASSIUM SERPL-SCNC: 4 MMOL/L (ref 3.5–5.3)
PROT SERPL-MCNC: 6.8 G/DL (ref 6.4–8.2)
SODIUM SERPL-SCNC: 138 MMOL/L (ref 136–145)

## 2019-02-06 PROCEDURE — 80053 COMPREHEN METABOLIC PANEL: CPT

## 2019-02-06 PROCEDURE — 36415 COLL VENOUS BLD VENIPUNCTURE: CPT

## 2019-02-06 PROCEDURE — 82306 VITAMIN D 25 HYDROXY: CPT

## 2019-02-06 PROCEDURE — 83036 HEMOGLOBIN GLYCOSYLATED A1C: CPT

## 2019-02-19 ENCOUNTER — CLINICAL SUPPORT (OUTPATIENT)
Dept: FAMILY MEDICINE CLINIC | Facility: CLINIC | Age: 84
End: 2019-02-19

## 2019-02-19 DIAGNOSIS — E11.65 UNCONTROLLED TYPE 2 DIABETES MELLITUS WITH HYPERGLYCEMIA (HCC): Primary | ICD-10-CM

## 2019-02-19 NOTE — TELEPHONE ENCOUNTER
Pt is originally supposed to take 2 tabs twice a day  However, she told me she does not tolerate that, so she takes 500mg BID  Please clarify if she is indeed using it TID? ? It is a BID drug    Thank you,  CB

## 2019-02-19 NOTE — TELEPHONE ENCOUNTER
Dr Marcia Tom, I spoke to pt to let her know that I got an approval on her Rx for Lantus Solo Star, Pt stated to me that she needs a refill for her Metformin , she states she is suppose to take 500 mg tid but in your note from last visit it looks like 500 mg bid, Which do you want pt to take?

## 2019-03-12 NOTE — ASSESSMENT & PLAN NOTE
Lab Results   Component Value Date    HGBA1C 7 7 (H) 02/06/2019       No results for input(s): POCGLU in the last 72 hours  Blood Sugar Average: Last 72 hrs:    Patient has transitions nicely to insulin usage  She is currently on Lantus 20 units daily  She remains on metformin 500 mg b i d  Her recent hemoglobin A1c has decreased from 9 8 to 7 7  She is on an ACE inhibitor for renal prophylaxis

## 2019-03-12 NOTE — ASSESSMENT & PLAN NOTE
Her last blood work revealed a very low vitamin D of 19  She was given Drisdol to take 3 times a week  Her repeat vitamin-D is 112  At this point, she should stop using vitamin-D and we will repeat it in 3 months

## 2019-03-13 ENCOUNTER — OFFICE VISIT (OUTPATIENT)
Dept: FAMILY MEDICINE CLINIC | Facility: CLINIC | Age: 84
End: 2019-03-13
Payer: MEDICARE

## 2019-03-13 VITALS
HEART RATE: 72 BPM | DIASTOLIC BLOOD PRESSURE: 80 MMHG | BODY MASS INDEX: 36.33 KG/M2 | HEIGHT: 64 IN | SYSTOLIC BLOOD PRESSURE: 118 MMHG | WEIGHT: 212.8 LBS

## 2019-03-13 DIAGNOSIS — E55.9 VITAMIN D DEFICIENCY: ICD-10-CM

## 2019-03-13 DIAGNOSIS — I10 ESSENTIAL HYPERTENSION: ICD-10-CM

## 2019-03-13 DIAGNOSIS — E11.65 UNCONTROLLED TYPE 2 DIABETES MELLITUS WITH HYPERGLYCEMIA (HCC): Primary | ICD-10-CM

## 2019-03-13 DIAGNOSIS — E78.00 PURE HYPERCHOLESTEROLEMIA: ICD-10-CM

## 2019-03-13 PROCEDURE — 99214 OFFICE O/P EST MOD 30 MIN: CPT | Performed by: FAMILY MEDICINE

## 2019-03-13 NOTE — PROGRESS NOTES
Assessment and Plan:    Pleasant 19-year-old female presents today to follow-up on her chronic medical conditions, with special attention to diabetes  She has been on insulin now for a few months and is doing extremely well  Her hemoglobin A1c has decreased from 9 8-7 7  She has not had any hypoglycemic episodes  Since her last visit, she had also been given prescription strength vitamin D3 to replenish her vitamin levels  Her vitamin-D level on repeat was elevated at 112  She has been told to stop vitamin-D at this point until further notice  Diabetes mellitus type 2, uncontrolled ([facility] [address] )  Lab Results   Component Value Date    HGBA1C 7 7 (H) 02/06/2019       No results for input(s): POCGLU in the last 72 hours  Blood Sugar Average: Last 72 hrs:    Patient has transitioned nicely to insulin usage  She is currently on Lantus 20 units daily  She remains on metformin 500 mg b i d  Her recent hemoglobin A1c has decreased from 9 8 to 7 7  She is on an ACE inhibitor for renal prophylaxis  She keeps a daily diary and is doing very well  Vitamin D deficiency  Her last blood work revealed a very low vitamin D of 19  She was given Drisdol to take 3 times a week  Her repeat vitamin-D is 112  At this point, she should stop using vitamin-D and we will repeat it in 3 months  Hypertension  Benzapril HCT  Hyperlipidemia  Labs are up-to-date  Lipids are very well controlled  Continue simvastatin 10 mg daily  Subjective:      Patient ID: Mylene Cheadle is a 80 y o  female  CC:    Chief Complaint   Patient presents with    Diabetes     pt also has blood work to review~cd       HPI:    Doing well on insulin  AM sugars usually less than 150  Lowest was 117 and she felt a little shaky  Achy in her joints        The following portions of the patient's history were reviewed and updated as appropriate: allergies, current medications, past family history, past medical history, past social history, past surgical history and problem list       Review of Systems   Constitutional:        See HPI   HENT: Negative for congestion, ear pain, mouth sores, sinus pressure and trouble swallowing  Eyes: Negative for discharge, redness and itching  Respiratory: Negative for apnea, cough, chest tightness, shortness of breath, wheezing and stridor  Cardiovascular: Negative for chest pain, palpitations and leg swelling  Gastrointestinal: Negative for abdominal distention, abdominal pain, blood in stool, constipation, diarrhea, nausea and vomiting  Endocrine: Negative for cold intolerance and heat intolerance  See HPI  Genitourinary: Negative for difficulty urinating, dysuria, flank pain and urgency  Musculoskeletal: Negative for arthralgias and myalgias  Skin: Negative for rash  Neurological: Negative for dizziness, seizures, syncope, speech difficulty, weakness, light-headedness, numbness and headaches  Hematological: Negative for adenopathy  Psychiatric/Behavioral: Negative for agitation, behavioral problems, confusion and sleep disturbance  The patient is not nervous/anxious  Objective:    Vitals:    03/13/19 1115   BP: 118/80   BP Location: Left arm   Patient Position: Sitting   Cuff Size: Large   Pulse: 72   Weight: 96 5 kg (212 lb 12 8 oz)   Height: 5' 4" (1 626 m)        Physical Exam   Constitutional: She is oriented to person, place, and time  She appears well-developed and well-nourished  No distress  HENT:   Head: Normocephalic and atraumatic  Right Ear: External ear normal    Left Ear: External ear normal    Eyes: Pupils are equal, round, and reactive to light  Conjunctivae and EOM are normal  No scleral icterus  Neck: Normal range of motion  Neck supple  Cardiovascular: Normal rate, regular rhythm and normal heart sounds  Exam reveals no gallop and no friction rub  No murmur heard  No carotid bruits appreciated     Pulmonary/Chest: Effort normal  No respiratory distress  She has no wheezes  She has no rales  She exhibits no tenderness  Musculoskeletal: Normal range of motion  She exhibits no edema, tenderness or deformity  Lymphadenopathy:     She has no cervical adenopathy  Neurological: She is alert and oriented to person, place, and time  She has normal reflexes  Skin: Skin is warm and dry  She is not diaphoretic  Psychiatric: She has a normal mood and affect  Her behavior is normal  Judgment and thought content normal    Nursing note and vitals reviewed

## 2019-06-03 LAB
LEFT EYE DIABETIC RETINOPATHY: NORMAL
RIGHT EYE DIABETIC RETINOPATHY: NORMAL

## 2019-06-11 ENCOUNTER — APPOINTMENT (OUTPATIENT)
Dept: LAB | Facility: CLINIC | Age: 84
End: 2019-06-11
Payer: MEDICARE

## 2019-06-11 ENCOUNTER — OFFICE VISIT (OUTPATIENT)
Dept: FAMILY MEDICINE CLINIC | Facility: CLINIC | Age: 84
End: 2019-06-11
Payer: MEDICARE

## 2019-06-11 VITALS
HEART RATE: 76 BPM | WEIGHT: 214.2 LBS | HEIGHT: 64 IN | BODY MASS INDEX: 36.57 KG/M2 | SYSTOLIC BLOOD PRESSURE: 120 MMHG | DIASTOLIC BLOOD PRESSURE: 80 MMHG

## 2019-06-11 DIAGNOSIS — M79.10 MYALGIA: ICD-10-CM

## 2019-06-11 DIAGNOSIS — M19.91 PRIMARY OSTEOARTHRITIS, UNSPECIFIED SITE: ICD-10-CM

## 2019-06-11 DIAGNOSIS — I10 ESSENTIAL HYPERTENSION: ICD-10-CM

## 2019-06-11 DIAGNOSIS — E78.00 PURE HYPERCHOLESTEROLEMIA: ICD-10-CM

## 2019-06-11 DIAGNOSIS — M25.512 ACUTE PAIN OF BOTH SHOULDERS: ICD-10-CM

## 2019-06-11 DIAGNOSIS — J45.20 MILD INTERMITTENT ASTHMA WITHOUT COMPLICATION: ICD-10-CM

## 2019-06-11 DIAGNOSIS — E11.65 UNCONTROLLED TYPE 2 DIABETES MELLITUS WITH HYPERGLYCEMIA (HCC): ICD-10-CM

## 2019-06-11 DIAGNOSIS — Z23 NEED FOR PNEUMOCOCCAL VACCINATION: ICD-10-CM

## 2019-06-11 DIAGNOSIS — M25.511 ACUTE PAIN OF BOTH SHOULDERS: ICD-10-CM

## 2019-06-11 DIAGNOSIS — E55.9 VITAMIN D DEFICIENCY: ICD-10-CM

## 2019-06-11 DIAGNOSIS — R26.2 AMBULATORY DYSFUNCTION: ICD-10-CM

## 2019-06-11 LAB
ERYTHROCYTE [SEDIMENTATION RATE] IN BLOOD: 25 MM/HOUR (ref 0–20)
SL AMB POCT HEMOGLOBIN AIC: 6.9 (ref ?–6.5)

## 2019-06-11 PROCEDURE — 85652 RBC SED RATE AUTOMATED: CPT | Performed by: FAMILY MEDICINE

## 2019-06-11 PROCEDURE — 83036 HEMOGLOBIN GLYCOSYLATED A1C: CPT | Performed by: FAMILY MEDICINE

## 2019-06-11 PROCEDURE — 36415 COLL VENOUS BLD VENIPUNCTURE: CPT | Performed by: FAMILY MEDICINE

## 2019-06-11 PROCEDURE — 99214 OFFICE O/P EST MOD 30 MIN: CPT | Performed by: FAMILY MEDICINE

## 2019-06-13 DIAGNOSIS — I10 ESSENTIAL HYPERTENSION: ICD-10-CM

## 2019-06-13 DIAGNOSIS — E78.00 PURE HYPERCHOLESTEROLEMIA: ICD-10-CM

## 2019-06-14 RX ORDER — SIMVASTATIN 10 MG
TABLET ORAL
Qty: 90 TABLET | Refills: 3 | Status: SHIPPED | OUTPATIENT
Start: 2019-06-14 | End: 2020-09-08

## 2019-08-27 DIAGNOSIS — E11.65 UNCONTROLLED TYPE 2 DIABETES MELLITUS WITH HYPERGLYCEMIA (HCC): ICD-10-CM

## 2019-08-28 RX ORDER — PEN NEEDLE, DIABETIC 31 GX5/16"
NEEDLE, DISPOSABLE MISCELLANEOUS
Qty: 90 EACH | Refills: 3 | Status: SHIPPED | OUTPATIENT
Start: 2019-08-28 | End: 2022-01-19

## 2019-09-09 ENCOUNTER — TRANSCRIBE ORDERS (OUTPATIENT)
Dept: ADMINISTRATIVE | Facility: HOSPITAL | Age: 84
End: 2019-09-09

## 2019-09-09 DIAGNOSIS — Z12.31 VISIT FOR SCREENING MAMMOGRAM: Primary | ICD-10-CM

## 2019-09-16 ENCOUNTER — LAB (OUTPATIENT)
Dept: LAB | Facility: CLINIC | Age: 84
End: 2019-09-16
Payer: MEDICARE

## 2019-09-16 DIAGNOSIS — E78.00 PURE HYPERCHOLESTEROLEMIA: ICD-10-CM

## 2019-09-16 DIAGNOSIS — E55.9 VITAMIN D DEFICIENCY: ICD-10-CM

## 2019-09-16 DIAGNOSIS — N32.81 OVERACTIVE BLADDER: ICD-10-CM

## 2019-09-16 DIAGNOSIS — E11.65 UNCONTROLLED TYPE 2 DIABETES MELLITUS WITH HYPERGLYCEMIA (HCC): ICD-10-CM

## 2019-09-16 DIAGNOSIS — J45.20 MILD INTERMITTENT ASTHMA WITHOUT COMPLICATION: ICD-10-CM

## 2019-09-16 DIAGNOSIS — I10 ESSENTIAL HYPERTENSION: ICD-10-CM

## 2019-09-16 DIAGNOSIS — M19.91 PRIMARY OSTEOARTHRITIS, UNSPECIFIED SITE: ICD-10-CM

## 2019-09-16 DIAGNOSIS — R26.2 AMBULATORY DYSFUNCTION: ICD-10-CM

## 2019-09-16 LAB
25(OH)D3 SERPL-MCNC: 30.5 NG/ML (ref 30–100)
ALBUMIN SERPL BCP-MCNC: 3.9 G/DL (ref 3.5–5)
ALP SERPL-CCNC: 77 U/L (ref 46–116)
ALT SERPL W P-5'-P-CCNC: 12 U/L (ref 12–78)
ANION GAP SERPL CALCULATED.3IONS-SCNC: 7 MMOL/L (ref 4–13)
AST SERPL W P-5'-P-CCNC: 13 U/L (ref 5–45)
BACTERIA UR QL AUTO: ABNORMAL /HPF
BASOPHILS # BLD AUTO: 0.06 THOUSANDS/ΜL (ref 0–0.1)
BASOPHILS NFR BLD AUTO: 1 % (ref 0–1)
BILIRUB SERPL-MCNC: 0.74 MG/DL (ref 0.2–1)
BILIRUB UR QL STRIP: ABNORMAL
BUN SERPL-MCNC: 16 MG/DL (ref 5–25)
CALCIUM SERPL-MCNC: 9.5 MG/DL (ref 8.3–10.1)
CHLORIDE SERPL-SCNC: 104 MMOL/L (ref 100–108)
CHOLEST SERPL-MCNC: 149 MG/DL (ref 50–200)
CLARITY UR: ABNORMAL
CO2 SERPL-SCNC: 28 MMOL/L (ref 21–32)
COLOR UR: ABNORMAL
CREAT SERPL-MCNC: 0.97 MG/DL (ref 0.6–1.3)
EOSINOPHIL # BLD AUTO: 0.25 THOUSAND/ΜL (ref 0–0.61)
EOSINOPHIL NFR BLD AUTO: 2 % (ref 0–6)
ERYTHROCYTE [DISTWIDTH] IN BLOOD BY AUTOMATED COUNT: 13.8 % (ref 11.6–15.1)
EST. AVERAGE GLUCOSE BLD GHB EST-MCNC: 160 MG/DL
GFR SERPL CREATININE-BSD FRML MDRD: 53 ML/MIN/1.73SQ M
GLUCOSE P FAST SERPL-MCNC: 107 MG/DL (ref 65–99)
GLUCOSE UR STRIP-MCNC: NEGATIVE MG/DL
HBA1C MFR BLD: 7.2 % (ref 4.2–6.3)
HCT VFR BLD AUTO: 44.3 % (ref 34.8–46.1)
HDLC SERPL-MCNC: 61 MG/DL (ref 40–60)
HGB BLD-MCNC: 13.4 G/DL (ref 11.5–15.4)
HGB UR QL STRIP.AUTO: NEGATIVE
HYALINE CASTS #/AREA URNS LPF: ABNORMAL /LPF
IMM GRANULOCYTES # BLD AUTO: 0.03 THOUSAND/UL (ref 0–0.2)
IMM GRANULOCYTES NFR BLD AUTO: 0 % (ref 0–2)
KETONES UR STRIP-MCNC: ABNORMAL MG/DL
LDLC SERPL CALC-MCNC: 67 MG/DL (ref 0–100)
LEUKOCYTE ESTERASE UR QL STRIP: ABNORMAL
LYMPHOCYTES # BLD AUTO: 2.26 THOUSANDS/ΜL (ref 0.6–4.47)
LYMPHOCYTES NFR BLD AUTO: 19 % (ref 14–44)
MCH RBC QN AUTO: 27.7 PG (ref 26.8–34.3)
MCHC RBC AUTO-ENTMCNC: 30.2 G/DL (ref 31.4–37.4)
MCV RBC AUTO: 92 FL (ref 82–98)
MONOCYTES # BLD AUTO: 0.66 THOUSAND/ΜL (ref 0.17–1.22)
MONOCYTES NFR BLD AUTO: 6 % (ref 4–12)
MUCOUS THREADS UR QL AUTO: ABNORMAL
NEUTROPHILS # BLD AUTO: 8.46 THOUSANDS/ΜL (ref 1.85–7.62)
NEUTS SEG NFR BLD AUTO: 72 % (ref 43–75)
NITRITE UR QL STRIP: NEGATIVE
NON-SQ EPI CELLS URNS QL MICRO: ABNORMAL /HPF
NONHDLC SERPL-MCNC: 88 MG/DL
NRBC BLD AUTO-RTO: 0 /100 WBCS
PH UR STRIP.AUTO: 6.5 [PH]
PLATELET # BLD AUTO: 314 THOUSANDS/UL (ref 149–390)
PMV BLD AUTO: 10 FL (ref 8.9–12.7)
POTASSIUM SERPL-SCNC: 3.9 MMOL/L (ref 3.5–5.3)
PROT SERPL-MCNC: 7.3 G/DL (ref 6.4–8.2)
PROT UR STRIP-MCNC: ABNORMAL MG/DL
RBC # BLD AUTO: 4.83 MILLION/UL (ref 3.81–5.12)
RBC #/AREA URNS AUTO: ABNORMAL /HPF
SODIUM SERPL-SCNC: 139 MMOL/L (ref 136–145)
SP GR UR STRIP.AUTO: 1.02 (ref 1–1.03)
T4 SERPL-MCNC: 10.8 UG/DL (ref 4.7–13.3)
TRIGL SERPL-MCNC: 104 MG/DL
TSH SERPL DL<=0.05 MIU/L-ACNC: 1.64 UIU/ML (ref 0.36–3.74)
UROBILINOGEN UR QL STRIP.AUTO: 1 E.U./DL
WBC # BLD AUTO: 11.72 THOUSAND/UL (ref 4.31–10.16)
WBC #/AREA URNS AUTO: ABNORMAL /HPF

## 2019-09-16 PROCEDURE — 80053 COMPREHEN METABOLIC PANEL: CPT

## 2019-09-16 PROCEDURE — 36415 COLL VENOUS BLD VENIPUNCTURE: CPT

## 2019-09-16 PROCEDURE — 85025 COMPLETE CBC W/AUTO DIFF WBC: CPT

## 2019-09-16 PROCEDURE — 82306 VITAMIN D 25 HYDROXY: CPT

## 2019-09-16 PROCEDURE — 84436 ASSAY OF TOTAL THYROXINE: CPT

## 2019-09-16 PROCEDURE — 84443 ASSAY THYROID STIM HORMONE: CPT

## 2019-09-16 PROCEDURE — 80061 LIPID PANEL: CPT

## 2019-09-16 PROCEDURE — 81001 URINALYSIS AUTO W/SCOPE: CPT

## 2019-09-16 PROCEDURE — 83036 HEMOGLOBIN GLYCOSYLATED A1C: CPT

## 2019-09-23 ENCOUNTER — OFFICE VISIT (OUTPATIENT)
Dept: FAMILY MEDICINE CLINIC | Facility: CLINIC | Age: 84
End: 2019-09-23
Payer: MEDICARE

## 2019-09-23 VITALS
WEIGHT: 216 LBS | RESPIRATION RATE: 18 BRPM | SYSTOLIC BLOOD PRESSURE: 130 MMHG | HEART RATE: 72 BPM | TEMPERATURE: 97.6 F | HEIGHT: 64 IN | DIASTOLIC BLOOD PRESSURE: 80 MMHG | OXYGEN SATURATION: 96 % | BODY MASS INDEX: 36.88 KG/M2

## 2019-09-23 DIAGNOSIS — Z12.39 SCREENING FOR BREAST CANCER: ICD-10-CM

## 2019-09-23 DIAGNOSIS — E11.65 UNCONTROLLED TYPE 2 DIABETES MELLITUS WITH HYPERGLYCEMIA (HCC): Primary | ICD-10-CM

## 2019-09-23 DIAGNOSIS — E78.00 PURE HYPERCHOLESTEROLEMIA: ICD-10-CM

## 2019-09-23 DIAGNOSIS — E55.9 VITAMIN D DEFICIENCY: ICD-10-CM

## 2019-09-23 DIAGNOSIS — I10 ESSENTIAL HYPERTENSION: ICD-10-CM

## 2019-09-23 DIAGNOSIS — J45.20 MILD INTERMITTENT ASTHMA WITHOUT COMPLICATION: ICD-10-CM

## 2019-09-23 PROCEDURE — 99214 OFFICE O/P EST MOD 30 MIN: CPT | Performed by: FAMILY MEDICINE

## 2019-09-23 NOTE — PROGRESS NOTES
Assessment and Plan:    63-year-old white female presents today to follow-up on chronic medical conditions  She also has blood work to review today  1  Diabetes mellitus with hyperglycemia - hemoglobin A1c is 7 2  Continue Lantus  Although it is very expensive, she is able to pay for it at this time  2  Hypertension - well controlled  Continue benazepril HCT  3  Hyperlipidemia - stable and well controlled on simvastatin 10 mg   4  Vitamin-D deficiency - take over-the-counter vitamin D3 2000 units daily  5  Asthma - stable  No recent flares  6  Healthcare maintenance - mammogram ordered  7  Grief reaction - discussed at length with patient today  No meds needed  She prefers to follow-up in 3-4 months and will call me sooner if needed  Labs reviewed at length with patient today  Return to office in 3-4 months  Subjective:      Patient ID: Ángel Rosario is a 80 y o  female  CC:    Chief Complaint   Patient presents with    Follow-up     pt is here for a 3 month follow up for DM       HPI:    Review chronic medical conditions and blood work  She is doing well on insulin regimen  Feels lack of energy for the past 3 months  She doesn't feel like cleaning  She enjoys going out for lunch and shopping and looks forward to those things  She does not feel sad or tearful  Her husbands daughter  and he is sad  She sleeps ok  Appetite is good  She notes that insulin is very expensive - about $3000 per year  She was off metformin but is back on it  1 BID  4 tabs give her diabetes  The following portions of the patient's history were reviewed and updated as appropriate: allergies, current medications, past family history, past medical history, past social history, past surgical history and problem list       Review of Systems   Constitutional:        See HPI   HENT: Negative for congestion, ear pain, mouth sores, sinus pressure and trouble swallowing      Eyes: Negative for discharge, redness and itching  Respiratory: Negative for apnea, cough, chest tightness, shortness of breath, wheezing and stridor  Cardiovascular: Negative for chest pain, palpitations and leg swelling  Gastrointestinal: Negative for abdominal distention, abdominal pain, blood in stool, constipation, diarrhea, nausea and vomiting  Endocrine: Negative for cold intolerance and heat intolerance  Genitourinary: Negative for difficulty urinating, dysuria, flank pain and urgency  Musculoskeletal: Negative for arthralgias and myalgias  Skin: Negative for rash  Neurological: Negative for dizziness, seizures, syncope, speech difficulty, weakness, light-headedness, numbness and headaches  Hematological: Negative for adenopathy  Psychiatric/Behavioral: Negative for agitation, behavioral problems, confusion and sleep disturbance  The patient is not nervous/anxious  Objective: There were no vitals filed for this visit  Physical Exam   Constitutional: She is oriented to person, place, and time  She appears well-developed and well-nourished  No distress  HENT:   Head: Normocephalic and atraumatic  Right Ear: External ear normal    Left Ear: External ear normal    Eyes: Pupils are equal, round, and reactive to light  Conjunctivae and EOM are normal  No scleral icterus  Neck: Normal range of motion  Neck supple  Cardiovascular: Normal rate, regular rhythm, normal heart sounds and intact distal pulses  Exam reveals no gallop and no friction rub  No murmur heard  No carotid bruits appreciated  Pulmonary/Chest: Effort normal  No respiratory distress  She has no wheezes  She has no rales  She exhibits no tenderness  Musculoskeletal: Normal range of motion  She exhibits no edema, tenderness or deformity  Lymphadenopathy:     She has no cervical adenopathy  Neurological: She is alert and oriented to person, place, and time  She has normal reflexes  Skin: Skin is warm and dry   She is not diaphoretic  Psychiatric: She has a normal mood and affect  Her behavior is normal  Judgment and thought content normal    Nursing note and vitals reviewed

## 2019-10-01 ENCOUNTER — APPOINTMENT (OUTPATIENT)
Dept: LAB | Facility: CLINIC | Age: 84
End: 2019-10-01
Payer: MEDICARE

## 2019-10-01 LAB
CREAT UR-MCNC: 198 MG/DL
MICROALBUMIN UR-MCNC: 54.6 MG/L (ref 0–20)
MICROALBUMIN/CREAT 24H UR: 28 MG/G CREATININE (ref 0–30)

## 2019-10-01 PROCEDURE — 82570 ASSAY OF URINE CREATININE: CPT | Performed by: FAMILY MEDICINE

## 2019-10-01 PROCEDURE — 82043 UR ALBUMIN QUANTITATIVE: CPT | Performed by: FAMILY MEDICINE

## 2019-10-28 ENCOUNTER — TELEPHONE (OUTPATIENT)
Dept: FAMILY MEDICINE CLINIC | Facility: CLINIC | Age: 84
End: 2019-10-28

## 2019-10-28 NOTE — TELEPHONE ENCOUNTER
Pt on insulin, 20 units a day  Recently fell, hurt hip, was in hospital   Told to take tylenol pm, got sick on that  Stopped muscle relaxer as well  Sugars are raging, as per pt  Talked pt into having an appt today with Emani Jules

## 2019-11-15 ENCOUNTER — TELEPHONE (OUTPATIENT)
Dept: FAMILY MEDICINE CLINIC | Facility: CLINIC | Age: 84
End: 2019-11-15

## 2019-11-15 NOTE — TELEPHONE ENCOUNTER
Patient was in [facility] after having a GI Bleed  She was admitted to the rehab department on Nov 4th and was discharged from there today 11/15/19 with Home care going out   Patient will call to set up TCM

## 2020-01-02 DIAGNOSIS — Z91.81 HISTORY OF RECENT FALL: ICD-10-CM

## 2020-01-02 DIAGNOSIS — M25.552 PAIN OF BOTH HIP JOINTS: Primary | ICD-10-CM

## 2020-01-02 DIAGNOSIS — Z85.09 HISTORY OF GASTROINTESTINAL STROMAL TUMOR (GIST): ICD-10-CM

## 2020-01-02 DIAGNOSIS — M25.551 PAIN OF BOTH HIP JOINTS: Primary | ICD-10-CM

## 2020-01-05 RX ORDER — TRAMADOL HYDROCHLORIDE 50 MG/1
50 TABLET ORAL EVERY 6 HOURS PRN
Qty: 6 TABLET | Refills: 0 | Status: SHIPPED | OUTPATIENT
Start: 2020-01-05 | End: 2020-08-12

## 2020-01-06 NOTE — TELEPHONE ENCOUNTER
Tramadol renewed for pt  Please notify her that she will need to see me if she needs continued Tramadol  I have not seen her since her recent fall and hospitalization for gastric tumor    Thank you,  CB

## 2020-01-08 ENCOUNTER — OFFICE VISIT (OUTPATIENT)
Dept: FAMILY MEDICINE CLINIC | Facility: CLINIC | Age: 85
End: 2020-01-08
Payer: MEDICARE

## 2020-01-08 VITALS
HEART RATE: 84 BPM | HEIGHT: 64 IN | WEIGHT: 201 LBS | SYSTOLIC BLOOD PRESSURE: 140 MMHG | DIASTOLIC BLOOD PRESSURE: 76 MMHG | BODY MASS INDEX: 34.31 KG/M2

## 2020-01-08 DIAGNOSIS — E11.65 UNCONTROLLED TYPE 2 DIABETES MELLITUS WITH HYPERGLYCEMIA (HCC): ICD-10-CM

## 2020-01-08 DIAGNOSIS — I10 ESSENTIAL HYPERTENSION: Primary | ICD-10-CM

## 2020-01-08 DIAGNOSIS — C49.A2 GASTROINTESTINAL STROMAL TUMOR (GIST) OF STOMACH (HCC): ICD-10-CM

## 2020-01-08 DIAGNOSIS — E55.9 VITAMIN D DEFICIENCY: ICD-10-CM

## 2020-01-08 PROBLEM — M25.552 LEFT HIP PAIN: Status: ACTIVE | Noted: 2019-10-17

## 2020-01-08 PROCEDURE — 99214 OFFICE O/P EST MOD 30 MIN: CPT | Performed by: FAMILY MEDICINE

## 2020-01-08 RX ORDER — PHENOL 1.4 %
AEROSOL, SPRAY (ML) MUCOUS MEMBRANE
COMMUNITY
End: 2020-08-12

## 2020-01-08 RX ORDER — NICOTINE POLACRILEX 2 MG
1 GUM BUCCAL DAILY
COMMUNITY

## 2020-01-08 NOTE — ASSESSMENT & PLAN NOTE
Patient to get labs done prior to seeing Dr Hafsa Hercules  Her last hemoglobin A1c done in October was 6 9% and her most recent hemoglobin A1c was 7 2% her fasting sugar at home yesterday was 120 and today it was 130  She is currently on Lantus 20 units HS, she is also on metformin 1000 mg b i d    Lab Results   Component Value Date    HGBA1C 7 2 (H) 09/16/2019

## 2020-01-08 NOTE — PROGRESS NOTES
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[facility])     Patient doing well from the surgery and is following up with Hematology/Oncology  Endocrine    Diabetes mellitus type 2, uncontrolled ([facility] [address] )     Patient to get labs done prior to seeing Dr Dwight Martinez  Her last hemoglobin A1c done in October was 6 9% and her most recent hemoglobin A1c was 7 2% her fasting sugar at home yesterday was 120 and today it was 130  She is currently on Lantus 20 units HS, she is also on metformin 1000 mg b i d  Lab Results   Component Value Date    HGBA1C 7 2 (H) 09/16/2019            Relevant Orders    CBC and differential    Comprehensive metabolic panel    LDL cholesterol, direct    TSH, 3rd generation    UA w Reflex to Microscopic w Reflex to Culture    Microalbumin / creatinine urine ratio       Cardiovascular and Mediastinum    Essential hypertension - Primary     Her blood pressure is stable at 140/76 and she is on Lotensin HCT 10-12 5 mg 1 daily  Other    Vitamin D deficiency     Her last vitamin-D level was within normal limits  Relevant Orders    CBC and differential    Comprehensive metabolic panel    LDL cholesterol, direct    TSH, 3rd generation    UA w Reflex to Microscopic w Reflex to Culture                 Diagnoses and all orders for this visit:    Essential hypertension    Uncontrolled type 2 diabetes mellitus with hyperglycemia (HCC)  -     CBC and differential; Future  -     Comprehensive metabolic panel; Future  -     LDL cholesterol, direct; Future  -     TSH, 3rd generation; Future  -     UA w Reflex to Microscopic w Reflex to Culture; Future  -     Microalbumin / creatinine urine ratio; Future    Vitamin D deficiency  -     CBC and differential; Future  -     Comprehensive metabolic panel;  Future  - LDL cholesterol, direct; Future  -     TSH, 3rd generation; Future  -     UA w Reflex to Microscopic w Reflex to Culture; Future    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)    Other orders  -     Biotin 1 MG CAPS; Take 1 mg by mouth daily  -     Melatonin 10 MG TABS; Take by mouth              Subjective:      Patient ID: Anabell Robison is a 80 y o  female  CC:    Chief Complaint   Patient presents with   [facility] follow up  - pt was in hospital 4 weeks after surgery   -  [facility]       HPI:    This is an 72-year-old female who comes in for a recheck following surgery to remove a tumor from her stomach  She is doing well with no complaints or problems at the present time  For she had the surgery her abdomen was distended and full of fluid and after the surgery she has lost approximately 30 lb  She is being followed by Hematology/Oncology and is due to see them again in 6 months  She is following her diabetic regimen and checking sugars in the morning and yesterday her fasting blood sugar was 120 and this morning it was 130  Her last hemoglobin A1c was done in October and it was 6 9%  She is requesting an appointment with Dr Rose Morales and lab work needs to be done before she sees her and that will be ordered today  Her blood pressure is 140/76 and her weight is 201 lb down 15 lb from the previous visit but 30 lb altogether  Her BMI is 34 5  The following portions of the patient's history were reviewed and updated as appropriate: allergies, current medications, past family history, past medical history, past social history, past surgical history and problem list       Review of Systems   Constitutional: Negative  HENT: Negative  Eyes: Negative  Respiratory: Negative  Cardiovascular: Negative  Gastrointestinal: Negative  Endocrine: Negative  Genitourinary: Negative  Musculoskeletal: Negative  Skin: Negative  Allergic/Immunologic: Negative

## 2020-01-24 DIAGNOSIS — E11.65 UNCONTROLLED TYPE 2 DIABETES MELLITUS WITH HYPERGLYCEMIA (HCC): ICD-10-CM

## 2020-01-25 RX ORDER — LANCETS
EACH MISCELLANEOUS
Qty: 100 EACH | Refills: 0 | Status: SHIPPED | OUTPATIENT
Start: 2020-01-25 | End: 2020-04-29 | Stop reason: SDUPTHER

## 2020-02-19 ENCOUNTER — APPOINTMENT (OUTPATIENT)
Dept: LAB | Facility: CLINIC | Age: 85
End: 2020-02-19
Payer: MEDICARE

## 2020-02-19 DIAGNOSIS — E55.9 VITAMIN D DEFICIENCY: ICD-10-CM

## 2020-02-19 DIAGNOSIS — E11.65 UNCONTROLLED TYPE 2 DIABETES MELLITUS WITH HYPERGLYCEMIA (HCC): ICD-10-CM

## 2020-02-19 LAB
ALBUMIN SERPL BCP-MCNC: 3.3 G/DL (ref 3.5–5)
ALP SERPL-CCNC: 73 U/L (ref 46–116)
ALT SERPL W P-5'-P-CCNC: 13 U/L (ref 12–78)
ANION GAP SERPL CALCULATED.3IONS-SCNC: 4 MMOL/L (ref 4–13)
AST SERPL W P-5'-P-CCNC: 13 U/L (ref 5–45)
BACTERIA UR QL AUTO: ABNORMAL /HPF
BASOPHILS # BLD AUTO: 0.08 THOUSANDS/ΜL (ref 0–0.1)
BASOPHILS NFR BLD AUTO: 1 % (ref 0–1)
BILIRUB SERPL-MCNC: 0.54 MG/DL (ref 0.2–1)
BILIRUB UR QL STRIP: NEGATIVE
BUN SERPL-MCNC: 17 MG/DL (ref 5–25)
CALCIUM SERPL-MCNC: 8.9 MG/DL (ref 8.3–10.1)
CHLORIDE SERPL-SCNC: 107 MMOL/L (ref 100–108)
CLARITY UR: CLEAR
CO2 SERPL-SCNC: 29 MMOL/L (ref 21–32)
COLOR UR: YELLOW
CREAT SERPL-MCNC: 0.81 MG/DL (ref 0.6–1.3)
CREAT UR-MCNC: 64.8 MG/DL
EOSINOPHIL # BLD AUTO: 0.3 THOUSAND/ΜL (ref 0–0.61)
EOSINOPHIL NFR BLD AUTO: 3 % (ref 0–6)
ERYTHROCYTE [DISTWIDTH] IN BLOOD BY AUTOMATED COUNT: 16.9 % (ref 11.6–15.1)
GFR SERPL CREATININE-BSD FRML MDRD: 66 ML/MIN/1.73SQ M
GLUCOSE P FAST SERPL-MCNC: 106 MG/DL (ref 65–99)
GLUCOSE UR STRIP-MCNC: NEGATIVE MG/DL
HCT VFR BLD AUTO: 39.5 % (ref 34.8–46.1)
HGB BLD-MCNC: 11.8 G/DL (ref 11.5–15.4)
HGB UR QL STRIP.AUTO: NEGATIVE
IMM GRANULOCYTES # BLD AUTO: 0.04 THOUSAND/UL (ref 0–0.2)
IMM GRANULOCYTES NFR BLD AUTO: 0 % (ref 0–2)
KETONES UR STRIP-MCNC: NEGATIVE MG/DL
LDLC SERPL DIRECT ASSAY-MCNC: 62 MG/DL (ref 0–100)
LEUKOCYTE ESTERASE UR QL STRIP: ABNORMAL
LYMPHOCYTES # BLD AUTO: 2.65 THOUSANDS/ΜL (ref 0.6–4.47)
LYMPHOCYTES NFR BLD AUTO: 24 % (ref 14–44)
MCH RBC QN AUTO: 25.2 PG (ref 26.8–34.3)
MCHC RBC AUTO-ENTMCNC: 29.9 G/DL (ref 31.4–37.4)
MCV RBC AUTO: 84 FL (ref 82–98)
MICROALBUMIN UR-MCNC: 14.5 MG/L (ref 0–20)
MICROALBUMIN/CREAT 24H UR: 22 MG/G CREATININE (ref 0–30)
MONOCYTES # BLD AUTO: 0.59 THOUSAND/ΜL (ref 0.17–1.22)
MONOCYTES NFR BLD AUTO: 5 % (ref 4–12)
NEUTROPHILS # BLD AUTO: 7.49 THOUSANDS/ΜL (ref 1.85–7.62)
NEUTS SEG NFR BLD AUTO: 67 % (ref 43–75)
NITRITE UR QL STRIP: NEGATIVE
NON-SQ EPI CELLS URNS QL MICRO: ABNORMAL /HPF
NRBC BLD AUTO-RTO: 0 /100 WBCS
PH UR STRIP.AUTO: 6.5 [PH]
PLATELET # BLD AUTO: 321 THOUSANDS/UL (ref 149–390)
PMV BLD AUTO: 10.4 FL (ref 8.9–12.7)
POTASSIUM SERPL-SCNC: 4.1 MMOL/L (ref 3.5–5.3)
PROT SERPL-MCNC: 6.7 G/DL (ref 6.4–8.2)
PROT UR STRIP-MCNC: NEGATIVE MG/DL
RBC # BLD AUTO: 4.69 MILLION/UL (ref 3.81–5.12)
RBC #/AREA URNS AUTO: ABNORMAL /HPF
SODIUM SERPL-SCNC: 140 MMOL/L (ref 136–145)
SP GR UR STRIP.AUTO: 1.01 (ref 1–1.03)
TSH SERPL DL<=0.05 MIU/L-ACNC: 2.28 UIU/ML (ref 0.36–3.74)
UROBILINOGEN UR QL STRIP.AUTO: 0.2 E.U./DL
WBC # BLD AUTO: 11.15 THOUSAND/UL (ref 4.31–10.16)
WBC #/AREA URNS AUTO: ABNORMAL /HPF

## 2020-02-19 PROCEDURE — 85025 COMPLETE CBC W/AUTO DIFF WBC: CPT

## 2020-02-19 PROCEDURE — 87086 URINE CULTURE/COLONY COUNT: CPT

## 2020-02-19 PROCEDURE — 80053 COMPREHEN METABOLIC PANEL: CPT

## 2020-02-19 PROCEDURE — 81001 URINALYSIS AUTO W/SCOPE: CPT

## 2020-02-19 PROCEDURE — 84443 ASSAY THYROID STIM HORMONE: CPT

## 2020-02-19 PROCEDURE — 83721 ASSAY OF BLOOD LIPOPROTEIN: CPT

## 2020-02-19 PROCEDURE — 36415 COLL VENOUS BLD VENIPUNCTURE: CPT

## 2020-02-19 PROCEDURE — 82043 UR ALBUMIN QUANTITATIVE: CPT

## 2020-02-19 PROCEDURE — 82570 ASSAY OF URINE CREATININE: CPT

## 2020-02-20 LAB — BACTERIA UR CULT: NORMAL

## 2020-02-25 ENCOUNTER — OFFICE VISIT (OUTPATIENT)
Dept: FAMILY MEDICINE CLINIC | Facility: CLINIC | Age: 85
End: 2020-02-25
Payer: MEDICARE

## 2020-02-25 VITALS
HEART RATE: 80 BPM | HEIGHT: 64 IN | RESPIRATION RATE: 16 BRPM | BODY MASS INDEX: 34.52 KG/M2 | WEIGHT: 202.2 LBS | DIASTOLIC BLOOD PRESSURE: 76 MMHG | SYSTOLIC BLOOD PRESSURE: 120 MMHG

## 2020-02-25 DIAGNOSIS — Z00.00 MEDICARE ANNUAL WELLNESS VISIT, SUBSEQUENT: ICD-10-CM

## 2020-02-25 DIAGNOSIS — E11.65 UNCONTROLLED TYPE 2 DIABETES MELLITUS WITH HYPERGLYCEMIA (HCC): ICD-10-CM

## 2020-02-25 DIAGNOSIS — E78.2 MIXED HYPERLIPIDEMIA: ICD-10-CM

## 2020-02-25 DIAGNOSIS — E55.9 VITAMIN D DEFICIENCY: ICD-10-CM

## 2020-02-25 DIAGNOSIS — I10 ESSENTIAL HYPERTENSION: ICD-10-CM

## 2020-02-25 DIAGNOSIS — J45.20 MILD INTERMITTENT ASTHMA WITHOUT COMPLICATION: ICD-10-CM

## 2020-02-25 DIAGNOSIS — C49.A2 GASTROINTESTINAL STROMAL TUMOR (GIST) OF STOMACH (HCC): Primary | ICD-10-CM

## 2020-02-25 DIAGNOSIS — R26.2 AMBULATORY DYSFUNCTION: ICD-10-CM

## 2020-02-25 LAB
LEFT EYE DIABETIC RETINOPATHY: ABNORMAL
LEFT EYE IMAGE QUALITY: ABNORMAL
LEFT EYE MACULAR EDEMA: ABNORMAL
LEFT EYE OTHER RETINOPATHY: ABNORMAL
RIGHT EYE DIABETIC RETINOPATHY: ABNORMAL
RIGHT EYE IMAGE QUALITY: ABNORMAL
RIGHT EYE MACULAR EDEMA: ABNORMAL
RIGHT EYE OTHER RETINOPATHY: ABNORMAL
SEVERITY (EYE EXAM): ABNORMAL
SL AMB POCT HEMOGLOBIN AIC: 6.4 (ref ?–6.5)

## 2020-02-25 PROCEDURE — 1123F ACP DISCUSS/DSCN MKR DOCD: CPT | Performed by: FAMILY MEDICINE

## 2020-02-25 PROCEDURE — G0439 PPPS, SUBSEQ VISIT: HCPCS | Performed by: FAMILY MEDICINE

## 2020-02-25 PROCEDURE — 1125F AMNT PAIN NOTED PAIN PRSNT: CPT | Performed by: FAMILY MEDICINE

## 2020-02-25 PROCEDURE — 92250 FUNDUS PHOTOGRAPHY W/I&R: CPT | Performed by: FAMILY MEDICINE

## 2020-02-25 PROCEDURE — 3078F DIAST BP <80 MM HG: CPT | Performed by: FAMILY MEDICINE

## 2020-02-25 PROCEDURE — 3044F HG A1C LEVEL LT 7.0%: CPT | Performed by: FAMILY MEDICINE

## 2020-02-25 PROCEDURE — 3074F SYST BP LT 130 MM HG: CPT | Performed by: FAMILY MEDICINE

## 2020-02-25 PROCEDURE — 83036 HEMOGLOBIN GLYCOSYLATED A1C: CPT | Performed by: FAMILY MEDICINE

## 2020-02-25 PROCEDURE — 2022F DILAT RTA XM EVC RTNOPTHY: CPT | Performed by: FAMILY MEDICINE

## 2020-02-25 PROCEDURE — 1160F RVW MEDS BY RX/DR IN RCRD: CPT | Performed by: FAMILY MEDICINE

## 2020-02-25 PROCEDURE — 4040F PNEUMOC VAC/ADMIN/RCVD: CPT | Performed by: FAMILY MEDICINE

## 2020-02-25 PROCEDURE — 1036F TOBACCO NON-USER: CPT | Performed by: FAMILY MEDICINE

## 2020-02-25 PROCEDURE — 99215 OFFICE O/P EST HI 40 MIN: CPT | Performed by: FAMILY MEDICINE

## 2020-02-25 PROCEDURE — 1170F FXNL STATUS ASSESSED: CPT | Performed by: FAMILY MEDICINE

## 2020-02-25 NOTE — PROGRESS NOTES
Assessment and Plan:       Pt presents for annual medicare questionnaire  1  Living will completed  2  Lives with partner, Delfina Norman, and has good support system at home  3  Recent hospitalization for GIST tumor and is doing well  4  Occasional urinary incontinence - does not want further intervention at this time  5  For full evaluation of chronic medical conditions, please see today's office note  Problem List Items Addressed This Visit        Digestive    Gastrointestinal stromal tumor (GIST) of stomach ([address] ) - Primary       Endocrine    Diabetes mellitus type 2, uncontrolled ([address] )    Relevant Orders    POCT hemoglobin A1c    IRIS Diabetic eye exam       Respiratory    Asthma       Cardiovascular and Mediastinum    Essential hypertension       Other    Mixed hyperlipidemia      Other Visit Diagnoses     Medicare annual wellness visit, subsequent               Preventive health issues were discussed with patient, and age appropriate screening tests were ordered as noted in patient's After Visit Summary  Personalized health advice and appropriate referrals for health education or preventive services given if needed, as noted in patient's After Visit Summary  History of Present Illness:     Patient presents for Medicare Annual Wellness visit    Patient Care Team:  Huber Daley MD as PCP - General     Problem List:     Patient Active Problem List   Diagnosis    Essential hypertension    Mixed hyperlipidemia    Diabetes mellitus type 2, uncontrolled ([address] )    Asthma    Vitamin D deficiency    Osteoarthritis    Obesity (BMI 30-39  9)    OAB (overactive bladder)    Hx of pulmonary embolus    Carotid artery stenosis    Ambulatory dysfunction    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)    Left hip pain    Personal history of malignant neoplasm of uterus      Past Medical and Surgical History:     Past Medical History:   Diagnosis Date    Cellulitis     last assessed 05/22/2013    DVT (deep venous thrombosis) ([facility] )     last assessed 07/23/2014    Hyperglycemia     last assessed 05/08/2013    Poorly controlled type 2 diabetes mellitus ([facility] )     last assessed 03/27/2014    Pulmonary embolism, bilateral ([facility] )     resolved 07/23/2014     Past Surgical History:   Procedure Laterality Date    COLECTOMY      partial, last assessed 07/23/2014    HYSTERECTOMY  09/1974    OOPHORECTOMY      TONSILLECTOMY      TOTAL HIP ARTHROPLASTY Bilateral     Once on the left and 3 times on the right side  [place]    TOTAL KNEE ARTHROPLASTY Right     last assessed 01/13/2016, managed by Otis Cummings MD      Family History:     Family History   Problem Relation Age of Onset    Bone cancer Mother     Lung cancer Father     Multiple sclerosis Sister     COPD Brother     Breast cancer Maternal Grandmother     Mental illness Family       Social History:        Social History     Socioeconomic History    Marital status:       Spouse name: None    Number of children: None    Years of education: None    Highest education level: None   Occupational History    None   Social Needs    Financial resource strain: None    Food insecurity:     Worry: None     Inability: None    Transportation needs:     Medical: None     Non-medical: None   Tobacco Use    Smoking status: Never Smoker    Smokeless tobacco: Never Used    Tobacco comment: No secondhand smoke exposure   Substance and Sexual Activity    Alcohol use: Yes     Comment: socially    Drug use: No    Sexual activity: None   Lifestyle    Physical activity:     Days per week: None     Minutes per session: None    Stress: None   Relationships    Social connections:     Talks on phone: None     Gets together: None     Attends [employer] service: None     Active member of club or organization: None     Attends meetings of clubs or organizations: None     Relationship status: None    Intimate partner violence:     Fear of current or ex partner: None     Emotionally abused: None     Physically abused: None     Forced sexual activity: None   Other Topics Concern    None   Social History Narrative    Lives with significant other      Medications and Allergies:     Current Outpatient Medications   Medication Sig Dispense Refill    acetaminophen (TYLENOL) 500 mg tablet Take 1,000 mg by mouth      aspirin 81 MG tablet Take 81 mg by mouth      B-D UF III MINI PEN NEEDLES 31G X 5 MM MISC USE ONCE DAILY WITH LANTUS PEN 90 each 3    benazepril-hydrochlorthiazide (LOTENSIN HCT) 10-12 5 MG per tablet TAKE ONE TABLET BY MOUTH EVERY DAY 90 tablet 3    Biotin 1 MG CAPS Take 1 mg by mouth daily      fluticasone (FLONASE) 50 mcg/act nasal spray 1 spray into each nostril      fluticasone-salmeterol (ADVAIR DISKUS) 250-50 mcg/dose inhaler Inhale 1 puff      glucose blood (ONE TOUCH ULTRA TEST) test strip TESTING ONCE TO TWICE DAILY 100 each 0    insulin glargine (LANTUS SOLOSTAR) 100 units/mL injection pen Inject 20 units daily  DX: E11 9 5 pen 5    Lancets (ONETOUCH ULTRASOFT) lancets TEST ONCE TO TWICE DAILY 100 each 0    Melatonin 10 MG TABS Take by mouth      metFORMIN (GLUCOPHAGE) 500 mg tablet Take 2 tablets (1,000 mg total) by mouth 2 (two) times a day with meals 180 tablet 3    Multiple Vitamins-Minerals (HAIR SKIN AND NAILS FORMULA PO) Take 1 capsule by mouth daily      psyllium (METAMUCIL) 58 6 % powder Take 1 packet by mouth daily      simvastatin (ZOCOR) 10 mg tablet TAKE ONE TABLET(S) DAILY AS DIRECTED 90 tablet 3    traMADol (ULTRAM) 50 mg tablet Take 1 tablet (50 mg total) by mouth every 6 (six) hours as needed for moderate pain 6 tablet 0     No current facility-administered medications for this visit  Allergies   Allergen Reactions    Codeine Nausea Only    Doxycycline     Erythromycin      Other reaction(s): GI upset  diarrhea    Levofloxacin      Category: Adverse Reaction;  Annotation - [id number]: Tendon rupture    Penicillins       Immunizations:     Immunization History   Administered Date(s) Administered    INFLUENZA 10/07/2015, 10/19/2018    Influenza Quadrivalent Preservative Free 3 years and older IM 10/17/2014    Influenza Split High Dose Preservative Free IM 10/07/2015, 11/08/2016, 09/15/2017    Influenza TIV (IM) 10/08/2009, 10/19/2010, 09/22/2011, 10/09/2012    Influenza, injectable, quadrivalent, preservative free 0 5 mL 10/03/2019    Pneumococcal Polysaccharide PPV23 08/17/2015    Zoster 06/01/2012    influenza, trivalent, adjuvanted 10/19/2018      Health Maintenance: There are no preventive care reminders to display for this patient  There are no preventive care reminders to display for this patient  Medicare Health Risk Assessment:     LMP  (LMP Unknown)          Health Risk Assessment:   Patient rates overall health as fair  Patient feels that their physical health rating is same  Eyesight was rated as slightly worse  Hearing was rated as same  Patient feels that their emotional and mental health rating is same  Pain experienced in the last 7 days has been none  Patient states that she has experienced no weight loss or gain in last 6 months  Fall Risk Screening: In the past year, patient has experienced: no history of falling in past year      Urinary Incontinence Screening:   Patient has leaked urine accidently in the last six months  Only on occasion  Does not want further intervention  Home Safety:  Patient has trouble with stairs inside or outside of their home  Patient has working smoke alarms and has working carbon monoxide detector  Home safety hazards include: none  Nutrition:   Current diet is Diabetic and Low Carb  Medications:   Patient is currently taking over-the-counter supplements  OTC medications include: see medication list  Patient is able to manage medications       Activities of Daily Living (ADLs)/Instrumental Activities of Daily Living (IADLs): Walk and transfer into and out of bed and chair?: Yes  Dress and groom yourself?: Yes    Bathe or shower yourself?: Yes    Feed yourself? Yes  Do your laundry/housekeeping?: Yes  Manage your money, pay your bills and track your expenses?: Yes  Make your own meals?: Yes    Do your own shopping?: Yes    Previous Hospitalizations:   Any hospitalizations or ED visits within the last 12 months?: Yes    How many hospitalizations have you had in the last year?: more than 4    Hospitalization Comments: See today's office note  Recent diagnosis of and removal of a GIST tumor  Advance Care Planning:   Living will: Yes    Durable POA for healthcare:  Yes    Advanced directive: Yes    Advanced directive counseling given: Yes    Five wishes given: No    End of Life Decisions reviewed with patient: Yes      Cognitive Screening:   Provider or family/friend/caregiver concerned regarding cognition?: No    PREVENTIVE SCREENINGS      Cardiovascular Screening:    General: History Lipid Disorder and Screening Current      Diabetes Screening:     General: History Diabetes and Screening Current      Colorectal Cancer Screening:     General: Screening Current      Breast Cancer Screening:     General: Screening Current      Cervical Cancer Screening:    General: Screening Not Indicated      Osteoporosis Screening:    General: Screening Not Indicated      Abdominal Aortic Aneurysm (AAA) Screening:        General: Screening Not Indicated      Dina Monet MD

## 2020-02-25 NOTE — PROGRESS NOTES
Assessment and Plan:    Mrs Kasey Rodriguez is a pleasant 72-year-old white female who was in her usual state of good health until October when she fell in the bathroom, requiring the squad to take her to the ER  At the ER, she had x-rays which were negative and she returned home  One week later, she began feeling very sick  She had fatigue, malaise and awoke 1 morning unable to walk  She was taken to [facility] ER  Evaluation revealed a gastric mass which was a GIST tumor  Patient was very anemic on admission with a hemoglobin of 6 2  She received 2 units of packed red blood cells  She also had surgery  She was hopitalized for 1 week and then sent to rehab for 3 weeks  She is currently feeling much better  Her family is very supportive  She is able to get around  She is spritely and active  She lives with her partner, Kellie Hawthorne  1  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 [facility]   Patient has also seen heme onc  She has follow-ups scheduled  F/U Hgb on 2/19/20 was 11 8  Tramadol prn pain, but not using it much  2  Diabetes mellitus - sugars have decreased since she has been hospitalized  Her recent hemoglobin A1c was 6 4  Due to insurance, her Lantus will be changed to Levemir  She remains on 20 units daily  She was using metformin 1000 mg daily but since her sugars have been running lower and she has been having diarrhea, that has been put on hold for the last couple days  Patient should continue to take her Levemir  Continue metformin as tolerated  Follow-up in 1 month to review sugars  3  Hypertension  - blood pressure is well controlled  Continue Ren-HCT    4  Hyperlipidemia - stable  Continue on simvastatin 10 mg daily  5  Asthma - symptoms stable without recent flare-ups  She remains on Flonase and Advair  6  Ambulatory dysfunction - did very well at rehab and is ambulating well with cane      7  Vit D def - cont OTC Vit D supplements  F/U 1 month      45 minutes spent with patient today reviewing her hospital admission, work up, surgical reports, labwork and oncology consultation  Recent imaging reports also reviewed  Greater than 50 percent of time spent on discussion of the above, treatment options and care coordination  Subjective:      Patient ID: Susan Torre is a 80 y o  female  CC:    Chief Complaint   Patient presents with    Follow-up     pt here today for a follow up- diabetes, overall pt states she is doing well  R Cruz  Medicare Wellness Visit       HPI:    Mrs Judith Luna is a pleasant 80-year-old white female who was in her usual state of good health until October when she fell in the bathroom, requiring the squad to take her to the ER  At the ER, she had x-rays which were negative and she returned home  One week later, she began feeling very sick  She had fatigue, malaise and awoke 1 morning unable to walk  She was taken to [facility] ER  Evaluation revealed a gastric mass which was a GIST tumor  Patient was very anemic on admission with a hemoglobin of 6 2  She received 2 units of packed red blood cells  She also had surgery  She was hopitalized for 1 week and then sent to rehab for 3 weeks  DM - sugars were monotored and adjusted during her stay  Her insurance changed and is no longer excepting Lantus  She is able to switch to Funny Or Die or Levemir  Last Hgb A1C was 6 4  She remains on Lantus 20 units at bedtime  Patient notes that her sugars have been running low  She stop taking metformin 2 days ago due to low sugars as well as diarrhea  Diarrhea - pt uses pepto bismol prn  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  - Dr Prakash Mariano  Her colonoscopy is UTD with Dr Jayson García  She is currently feeling much better  Her family is very supportive  She is able to get around  She is spritely and active   She lives with her partner, Luna Severance  The following portions of the patient's history were reviewed and updated as appropriate: allergies, current medications, past family history, past medical history, past social history, past surgical history and problem list       Review of Systems   Constitutional:        See HPI   HENT: Negative for congestion, ear pain, mouth sores, sinus pressure and trouble swallowing  Eyes: Negative for discharge, redness and itching  Respiratory: Negative for apnea, cough, chest tightness, shortness of breath, wheezing and stridor  Cardiovascular: Negative for chest pain, palpitations and leg swelling  Gastrointestinal: Positive for diarrhea  Negative for abdominal distention, abdominal pain, blood in stool, constipation, nausea and vomiting  See HPI  Endocrine: Negative for cold intolerance and heat intolerance  See HPI  Genitourinary: Negative for difficulty urinating, dysuria, flank pain and urgency  Musculoskeletal: Positive for gait problem  Negative for arthralgias and myalgias  Skin: Negative for rash  Neurological: Negative for dizziness, seizures, syncope, speech difficulty, weakness, light-headedness, numbness and headaches  Hematological: Negative for adenopathy  See HPI  Psychiatric/Behavioral: Negative for agitation, behavioral problems, confusion and sleep disturbance  The patient is not nervous/anxious  Data to review:       Objective:    Vitals:    02/25/20 1129   BP: 120/76   BP Location: Left arm   Patient Position: Sitting   Cuff Size: Large   Pulse: 80   Resp: 16   Weight: 91 7 kg (202 lb 3 2 oz)   Height: 5' 4" (1 626 m)        Physical Exam   Constitutional: She is oriented to person, place, and time  She appears well-developed and well-nourished  No distress  HENT:   Head: Normocephalic and atraumatic  Right Ear: External ear normal    Left Ear: External ear normal    Eyes: Pupils are equal, round, and reactive to light  Conjunctivae and EOM are normal  No scleral icterus  Neck: Normal range of motion  Neck supple  Cardiovascular: Normal rate, regular rhythm, normal heart sounds and intact distal pulses  Exam reveals no gallop and no friction rub  No murmur heard  Pulses:       Dorsalis pedis pulses are 2+ on the right side, and 2+ on the left side  Posterior tibial pulses are 2+ on the right side, and 2+ on the left side  No carotid bruits appreciated  Pulmonary/Chest: Effort normal  No respiratory distress  She has no wheezes  She has no rales  She exhibits no tenderness  Abdominal: Soft  Bowel sounds are normal  She exhibits no distension and no mass  There is no tenderness  There is no rebound and no guarding  Her abdominal scar is healing well  Skin is well apposed  No erythema or exudates  Nontender  No guarding or rebound  Musculoskeletal: Normal range of motion  She exhibits no edema, tenderness or deformity  Uses a cane for added stability while ambulating  Feet:   Right Foot:   Skin Integrity: Positive for dry skin  Negative for ulcer, skin breakdown, erythema, warmth or callus  Left Foot:   Skin Integrity: Positive for dry skin  Negative for ulcer, skin breakdown, erythema, warmth or callus  Lymphadenopathy:     She has no cervical adenopathy  Neurological: She is alert and oriented to person, place, and time  She has normal reflexes  Skin: Skin is warm and dry  She is not diaphoretic  Psychiatric: She has a normal mood and affect  Her behavior is normal  Judgment and thought content normal    Nursing note and vitals reviewed  BMI Counseling: Body mass index is 34 71 kg/m²  The BMI is above normal  Nutrition recommendations include decreasing portion sizes, encouraging healthy choices of fruits and vegetables and decreasing fast food intake  Exercise recommendations include exercising 3-5 times per week  Diabetic Foot Exam    Patient's shoes and socks removed  Right Foot/Ankle   Right Foot Inspection  Skin Exam: skin normal, skin intact and dry skin no warmth, no callus, no erythema, no maceration, no abnormal color, no pre-ulcer, no ulcer and no callus                          Toe Exam: ROM and strength within normal limits  Sensory     Proprioception: intact   Monofilament testing: intact  Vascular  Capillary refills: < 3 seconds  The right DP pulse is 2+  The right PT pulse is 2+  Left Foot/Ankle  Left Foot Inspection  Skin Exam: skin normal, skin intact and dry skinno warmth, no erythema, no maceration, normal color, no pre-ulcer, no ulcer and no callus                         Toe Exam: ROM and strength within normal limits                   Sensory     Proprioception: intact  Monofilament: intact  Vascular  Capillary refills: < 3 seconds  The left DP pulse is 2+  The left PT pulse is 2+  Assign Risk Category:  No deformity present;  No loss of protective sensation;        Risk: 0

## 2020-03-10 ENCOUNTER — TELEPHONE (OUTPATIENT)
Dept: FAMILY MEDICINE CLINIC | Facility: CLINIC | Age: 85
End: 2020-03-10

## 2020-03-17 DIAGNOSIS — E11.65 UNCONTROLLED TYPE 2 DIABETES MELLITUS WITH HYPERGLYCEMIA (HCC): ICD-10-CM

## 2020-04-06 ENCOUNTER — TELEPHONE (OUTPATIENT)
Dept: FAMILY MEDICINE CLINIC | Facility: CLINIC | Age: 85
End: 2020-04-06

## 2020-04-17 DIAGNOSIS — J45.20 MILD INTERMITTENT ASTHMA WITHOUT COMPLICATION: Primary | ICD-10-CM

## 2020-04-21 ENCOUNTER — TELEPHONE (OUTPATIENT)
Dept: FAMILY MEDICINE CLINIC | Facility: CLINIC | Age: 85
End: 2020-04-21

## 2020-04-22 DIAGNOSIS — C49.A2 GASTROINTESTINAL STROMAL TUMOR (GIST) OF STOMACH (HCC): ICD-10-CM

## 2020-04-22 DIAGNOSIS — E11.65 UNCONTROLLED TYPE 2 DIABETES MELLITUS WITH HYPERGLYCEMIA (HCC): ICD-10-CM

## 2020-04-22 DIAGNOSIS — E55.9 VITAMIN D DEFICIENCY: ICD-10-CM

## 2020-04-22 DIAGNOSIS — J45.20 MILD INTERMITTENT ASTHMA WITHOUT COMPLICATION: ICD-10-CM

## 2020-04-22 DIAGNOSIS — I10 ESSENTIAL HYPERTENSION: Primary | ICD-10-CM

## 2020-04-22 DIAGNOSIS — E78.2 MIXED HYPERLIPIDEMIA: ICD-10-CM

## 2020-04-29 ENCOUNTER — TELEMEDICINE (OUTPATIENT)
Dept: FAMILY MEDICINE CLINIC | Facility: CLINIC | Age: 85
End: 2020-04-29
Payer: MEDICARE

## 2020-04-29 DIAGNOSIS — E11.65 UNCONTROLLED TYPE 2 DIABETES MELLITUS WITH HYPERGLYCEMIA (HCC): ICD-10-CM

## 2020-04-29 DIAGNOSIS — E78.2 MIXED HYPERLIPIDEMIA: ICD-10-CM

## 2020-04-29 DIAGNOSIS — I10 ESSENTIAL HYPERTENSION: Primary | ICD-10-CM

## 2020-04-29 DIAGNOSIS — J45.20 MILD INTERMITTENT ASTHMA WITHOUT COMPLICATION: ICD-10-CM

## 2020-04-29 DIAGNOSIS — C49.A2 GASTROINTESTINAL STROMAL TUMOR (GIST) OF STOMACH (HCC): ICD-10-CM

## 2020-04-29 PROCEDURE — 99214 OFFICE O/P EST MOD 30 MIN: CPT | Performed by: FAMILY MEDICINE

## 2020-04-29 RX ORDER — LANCETS
EACH MISCELLANEOUS
Qty: 100 EACH | Refills: 0 | Status: SHIPPED | OUTPATIENT
Start: 2020-04-29 | End: 2020-07-09

## 2020-07-09 DIAGNOSIS — E11.65 UNCONTROLLED TYPE 2 DIABETES MELLITUS WITH HYPERGLYCEMIA (HCC): ICD-10-CM

## 2020-07-09 RX ORDER — BLOOD SUGAR DIAGNOSTIC
STRIP MISCELLANEOUS
Qty: 100 EACH | Refills: 0 | Status: SHIPPED | OUTPATIENT
Start: 2020-07-09 | End: 2020-09-10

## 2020-07-09 RX ORDER — LANCETS
EACH MISCELLANEOUS
Qty: 100 EACH | Refills: 0 | Status: SHIPPED | OUTPATIENT
Start: 2020-07-09 | End: 2020-09-10

## 2020-08-07 ENCOUNTER — TRANSCRIBE ORDERS (OUTPATIENT)
Dept: LAB | Facility: CLINIC | Age: 85
End: 2020-08-07

## 2020-08-07 ENCOUNTER — APPOINTMENT (OUTPATIENT)
Dept: LAB | Facility: CLINIC | Age: 85
End: 2020-08-07
Payer: MEDICARE

## 2020-08-07 DIAGNOSIS — I10 ESSENTIAL HYPERTENSION: ICD-10-CM

## 2020-08-07 DIAGNOSIS — E78.2 MIXED HYPERLIPIDEMIA: ICD-10-CM

## 2020-08-07 DIAGNOSIS — J45.20 MILD INTERMITTENT ASTHMA WITHOUT COMPLICATION: ICD-10-CM

## 2020-08-07 DIAGNOSIS — C49.A2 GASTROINTESTINAL STROMAL TUMOR (GIST) OF STOMACH (HCC): Primary | ICD-10-CM

## 2020-08-07 DIAGNOSIS — E55.9 VITAMIN D DEFICIENCY: ICD-10-CM

## 2020-08-07 DIAGNOSIS — C49.A2 GASTROINTESTINAL STROMAL TUMOR (GIST) OF STOMACH (HCC): ICD-10-CM

## 2020-08-07 DIAGNOSIS — E11.65 UNCONTROLLED TYPE 2 DIABETES MELLITUS WITH HYPERGLYCEMIA (HCC): ICD-10-CM

## 2020-08-07 LAB
25(OH)D3 SERPL-MCNC: 42.5 NG/ML (ref 30–100)
ALBUMIN SERPL BCP-MCNC: 3.4 G/DL (ref 3.5–5)
ALP SERPL-CCNC: 72 U/L (ref 46–116)
ALT SERPL W P-5'-P-CCNC: 17 U/L (ref 12–78)
ANION GAP SERPL CALCULATED.3IONS-SCNC: 7 MMOL/L (ref 4–13)
AST SERPL W P-5'-P-CCNC: 15 U/L (ref 5–45)
BASOPHILS # BLD AUTO: 0.08 THOUSANDS/ΜL (ref 0–0.1)
BASOPHILS NFR BLD AUTO: 1 % (ref 0–1)
BILIRUB SERPL-MCNC: 0.54 MG/DL (ref 0.2–1)
BUN SERPL-MCNC: 18 MG/DL (ref 5–25)
CALCIUM SERPL-MCNC: 9.3 MG/DL (ref 8.3–10.1)
CHLORIDE SERPL-SCNC: 106 MMOL/L (ref 100–108)
CHOLEST SERPL-MCNC: 148 MG/DL (ref 50–200)
CO2 SERPL-SCNC: 27 MMOL/L (ref 21–32)
CREAT SERPL-MCNC: 0.9 MG/DL (ref 0.6–1.3)
EOSINOPHIL # BLD AUTO: 0.21 THOUSAND/ΜL (ref 0–0.61)
EOSINOPHIL NFR BLD AUTO: 2 % (ref 0–6)
ERYTHROCYTE [DISTWIDTH] IN BLOOD BY AUTOMATED COUNT: 13.5 % (ref 11.6–15.1)
EST. AVERAGE GLUCOSE BLD GHB EST-MCNC: 180 MG/DL
GFR SERPL CREATININE-BSD FRML MDRD: 58 ML/MIN/1.73SQ M
GLUCOSE P FAST SERPL-MCNC: 102 MG/DL (ref 65–99)
HBA1C MFR BLD: 7.9 %
HCT VFR BLD AUTO: 43.5 % (ref 34.8–46.1)
HDLC SERPL-MCNC: 62 MG/DL
HGB BLD-MCNC: 13.7 G/DL (ref 11.5–15.4)
IMM GRANULOCYTES # BLD AUTO: 0.1 THOUSAND/UL (ref 0–0.2)
IMM GRANULOCYTES NFR BLD AUTO: 1 % (ref 0–2)
LDLC SERPL CALC-MCNC: 70 MG/DL (ref 0–100)
LYMPHOCYTES # BLD AUTO: 2.08 THOUSANDS/ΜL (ref 0.6–4.47)
LYMPHOCYTES NFR BLD AUTO: 18 % (ref 14–44)
MCH RBC QN AUTO: 28.8 PG (ref 26.8–34.3)
MCHC RBC AUTO-ENTMCNC: 31.5 G/DL (ref 31.4–37.4)
MCV RBC AUTO: 91 FL (ref 82–98)
MONOCYTES # BLD AUTO: 0.65 THOUSAND/ΜL (ref 0.17–1.22)
MONOCYTES NFR BLD AUTO: 6 % (ref 4–12)
NEUTROPHILS # BLD AUTO: 8.72 THOUSANDS/ΜL (ref 1.85–7.62)
NEUTS SEG NFR BLD AUTO: 72 % (ref 43–75)
NONHDLC SERPL-MCNC: 86 MG/DL
NRBC BLD AUTO-RTO: 0 /100 WBCS
PLATELET # BLD AUTO: 306 THOUSANDS/UL (ref 149–390)
PMV BLD AUTO: 10.4 FL (ref 8.9–12.7)
POTASSIUM SERPL-SCNC: 4 MMOL/L (ref 3.5–5.3)
PROT SERPL-MCNC: 7.1 G/DL (ref 6.4–8.2)
RBC # BLD AUTO: 4.76 MILLION/UL (ref 3.81–5.12)
SODIUM SERPL-SCNC: 140 MMOL/L (ref 136–145)
T4 SERPL-MCNC: 10.3 UG/DL (ref 4.7–13.3)
TRIGL SERPL-MCNC: 79 MG/DL
TSH SERPL DL<=0.05 MIU/L-ACNC: 2.07 UIU/ML (ref 0.36–3.74)
WBC # BLD AUTO: 11.84 THOUSAND/UL (ref 4.31–10.16)

## 2020-08-07 PROCEDURE — 82306 VITAMIN D 25 HYDROXY: CPT

## 2020-08-07 PROCEDURE — 84436 ASSAY OF TOTAL THYROXINE: CPT

## 2020-08-07 PROCEDURE — 80061 LIPID PANEL: CPT

## 2020-08-07 PROCEDURE — 80053 COMPREHEN METABOLIC PANEL: CPT

## 2020-08-07 PROCEDURE — 84443 ASSAY THYROID STIM HORMONE: CPT

## 2020-08-07 PROCEDURE — 83036 HEMOGLOBIN GLYCOSYLATED A1C: CPT

## 2020-08-07 PROCEDURE — 85025 COMPLETE CBC W/AUTO DIFF WBC: CPT

## 2020-08-07 PROCEDURE — 36415 COLL VENOUS BLD VENIPUNCTURE: CPT

## 2020-08-12 ENCOUNTER — OFFICE VISIT (OUTPATIENT)
Dept: FAMILY MEDICINE CLINIC | Facility: CLINIC | Age: 85
End: 2020-08-12
Payer: MEDICARE

## 2020-08-12 VITALS
HEIGHT: 64 IN | RESPIRATION RATE: 16 BRPM | DIASTOLIC BLOOD PRESSURE: 74 MMHG | WEIGHT: 207 LBS | TEMPERATURE: 97.8 F | BODY MASS INDEX: 35.34 KG/M2 | HEART RATE: 80 BPM | SYSTOLIC BLOOD PRESSURE: 122 MMHG

## 2020-08-12 DIAGNOSIS — I10 ESSENTIAL HYPERTENSION: ICD-10-CM

## 2020-08-12 DIAGNOSIS — E11.65 UNCONTROLLED TYPE 2 DIABETES MELLITUS WITH HYPERGLYCEMIA (HCC): Primary | ICD-10-CM

## 2020-08-12 DIAGNOSIS — J45.20 MILD INTERMITTENT ASTHMA WITHOUT COMPLICATION: ICD-10-CM

## 2020-08-12 DIAGNOSIS — E78.2 MIXED HYPERLIPIDEMIA: ICD-10-CM

## 2020-08-12 PROCEDURE — 1160F RVW MEDS BY RX/DR IN RCRD: CPT | Performed by: FAMILY MEDICINE

## 2020-08-12 PROCEDURE — 1036F TOBACCO NON-USER: CPT | Performed by: FAMILY MEDICINE

## 2020-08-12 PROCEDURE — 3078F DIAST BP <80 MM HG: CPT | Performed by: FAMILY MEDICINE

## 2020-08-12 PROCEDURE — 3051F HG A1C>EQUAL 7.0%<8.0%: CPT | Performed by: FAMILY MEDICINE

## 2020-08-12 PROCEDURE — 4040F PNEUMOC VAC/ADMIN/RCVD: CPT | Performed by: FAMILY MEDICINE

## 2020-08-12 PROCEDURE — 99214 OFFICE O/P EST MOD 30 MIN: CPT | Performed by: FAMILY MEDICINE

## 2020-08-12 PROCEDURE — 3074F SYST BP LT 130 MM HG: CPT | Performed by: FAMILY MEDICINE

## 2020-08-12 PROCEDURE — 2022F DILAT RTA XM EVC RTNOPTHY: CPT | Performed by: FAMILY MEDICINE

## 2020-08-12 NOTE — PROGRESS NOTES
Assessment and Plan:    Pleasant and spritely 77-year-old white female presents today to follow-up on her chronic medical conditions  She is going to be 86 in 2 days  She feels well  She admits that she has not been compliant with her diet, during the [place] pandemic  She has been cooking a lot for her and her   She continues to have close contact with her family members and is maintain social interactions  She checks her am sugars routinely at home, and notes them to be mostly less than 140       1  Diabetes type 2, uncontrolled - hemoglobin A1c is increased today at 7 9, increased from 6 8 last time  This is due to noncompliance Levemir 20 units at bedtime  Reviewed food choices and portion control today with patient  She would prefer to make adjustments to her meal planning, instead of increasing her Levemir  I explained that her fasting sugars are normal but that her daytime sugars are likely increased and encouraged her to check her midday sugars  2  Hypertension - blood pressure is well controlled  Continue benazepril HCTZ  3  Hyperlipidemia - lipids are under excellent control  Continue Zocor 10 mg daily  4  Mild intermittent asthma - no recent flare ups  Continue on Flonase and Advair  Labs reviewed at length with patient today  Follow-up in 3-4 months  Subjective:      Patient ID: Lowell Thomson is a 80 y o  female  CC:    Chief Complaint   Patient presents with    Follow-up     pt here for a follow up and to review lab results  RON Daniel       HPI:    levemir 20u qHS  Stopped metformin in January due to diarrhea  Review BW today  Admits she has not been compliant with diabetic diet        The following portions of the patient's history were reviewed and updated as appropriate: allergies, current medications, past family history, past medical history, past social history, past surgical history and problem list       Review of Systems   Constitutional:        See HPI   HENT: Negative for congestion, ear pain, mouth sores, sinus pressure and trouble swallowing  Eyes: Negative for discharge, redness and itching  Respiratory: Negative for apnea, cough, chest tightness, shortness of breath, wheezing and stridor  Cardiovascular: Negative for chest pain, palpitations and leg swelling  Gastrointestinal: Negative for abdominal distention, abdominal pain, blood in stool, constipation, diarrhea, nausea and vomiting  Endocrine: Negative for cold intolerance and heat intolerance  See HPI   Genitourinary: Negative for difficulty urinating, dysuria, flank pain and urgency  Musculoskeletal: Negative for arthralgias and myalgias  Skin: Negative for rash  Neurological: Negative for dizziness, seizures, syncope, speech difficulty, weakness, light-headedness, numbness and headaches  Hematological: Negative for adenopathy  Psychiatric/Behavioral: Negative for agitation, behavioral problems, confusion and sleep disturbance  The patient is not nervous/anxious  Data to review:       Objective:    Vitals:    08/12/20 1115   BP: 122/74   BP Location: Left arm   Patient Position: Sitting   Cuff Size: Large   Pulse: 80   Resp: 16   Temp: 97 8 °F (36 6 °C)   TempSrc: Temporal   Weight: 93 9 kg (207 lb)   Height: 5' 4" (1 626 m)        Physical Exam  Constitutional:       General: She is not in acute distress  Appearance: She is well-developed  HENT:      Head: Normocephalic and atraumatic  Right Ear: External ear normal       Left Ear: External ear normal       Nose: Nose normal    Eyes:      General: No scleral icterus  Conjunctiva/sclera: Conjunctivae normal       Pupils: Pupils are equal, round, and reactive to light  Neck:      Musculoskeletal: Normal range of motion and neck supple  Cardiovascular:      Rate and Rhythm: Normal rate and regular rhythm  Heart sounds: Normal heart sounds  No murmur  No friction rub  No gallop  Pulmonary:      Effort: Pulmonary effort is normal  No respiratory distress  Breath sounds: Normal breath sounds  No wheezing or rales  Musculoskeletal: Normal range of motion  General: No tenderness  Lymphadenopathy:      Cervical: No cervical adenopathy  Skin:     General: Skin is warm and dry  Neurological:      Mental Status: She is alert and oriented to person, place, and time  Psychiatric:         Behavior: Behavior normal          Thought Content:  Thought content normal          Judgment: Judgment normal

## 2020-09-08 DIAGNOSIS — E78.00 PURE HYPERCHOLESTEROLEMIA: ICD-10-CM

## 2020-09-08 DIAGNOSIS — I10 ESSENTIAL HYPERTENSION: ICD-10-CM

## 2020-09-08 RX ORDER — SIMVASTATIN 10 MG
TABLET ORAL
Qty: 90 TABLET | Refills: 3 | Status: SHIPPED | OUTPATIENT
Start: 2020-09-08 | End: 2021-09-07

## 2020-09-10 DIAGNOSIS — E11.65 UNCONTROLLED TYPE 2 DIABETES MELLITUS WITH HYPERGLYCEMIA (HCC): ICD-10-CM

## 2020-09-10 RX ORDER — LANCETS
EACH MISCELLANEOUS
Qty: 100 EACH | Refills: 0 | Status: SHIPPED | OUTPATIENT
Start: 2020-09-10 | End: 2021-04-16

## 2020-09-10 RX ORDER — BLOOD SUGAR DIAGNOSTIC
STRIP MISCELLANEOUS
Qty: 100 EACH | Refills: 0 | Status: SHIPPED | OUTPATIENT
Start: 2020-09-10 | End: 2021-04-16

## 2020-10-29 ENCOUNTER — TELEMEDICINE (OUTPATIENT)
Dept: FAMILY MEDICINE CLINIC | Facility: CLINIC | Age: 85
End: 2020-10-29
Payer: MEDICARE

## 2020-10-29 DIAGNOSIS — Z20.822 EXPOSURE TO COVID-19 VIRUS: Primary | ICD-10-CM

## 2020-10-29 PROCEDURE — 99441 PR PHYS/QHP TELEPHONE EVALUATION 5-10 MIN: CPT | Performed by: FAMILY MEDICINE

## 2020-10-30 DIAGNOSIS — Z20.822 EXPOSURE TO COVID-19 VIRUS: ICD-10-CM

## 2020-10-30 PROCEDURE — U0003 INFECTIOUS AGENT DETECTION BY NUCLEIC ACID (DNA OR RNA); SEVERE ACUTE RESPIRATORY SYNDROME CORONAVIRUS 2 (SARS-COV-2) (CORONAVIRUS DISEASE [COVID-19]), AMPLIFIED PROBE TECHNIQUE, MAKING USE OF HIGH THROUGHPUT TECHNOLOGIES AS DESCRIBED BY CMS-2020-01-R: HCPCS | Performed by: FAMILY MEDICINE

## 2020-10-31 LAB — SARS-COV-2 RNA SPEC QL NAA+PROBE: NOT DETECTED

## 2020-11-11 ENCOUNTER — OFFICE VISIT (OUTPATIENT)
Dept: FAMILY MEDICINE CLINIC | Facility: CLINIC | Age: 85
End: 2020-11-11
Payer: MEDICARE

## 2020-11-11 VITALS
TEMPERATURE: 97.5 F | WEIGHT: 203 LBS | BODY MASS INDEX: 34.66 KG/M2 | DIASTOLIC BLOOD PRESSURE: 80 MMHG | HEART RATE: 72 BPM | SYSTOLIC BLOOD PRESSURE: 138 MMHG | HEIGHT: 64 IN

## 2020-11-11 DIAGNOSIS — E78.2 MIXED HYPERLIPIDEMIA: ICD-10-CM

## 2020-11-11 DIAGNOSIS — E11.65 UNCONTROLLED TYPE 2 DIABETES MELLITUS WITH HYPERGLYCEMIA (HCC): ICD-10-CM

## 2020-11-11 DIAGNOSIS — C49.A2 GASTROINTESTINAL STROMAL TUMOR (GIST) OF STOMACH (HCC): ICD-10-CM

## 2020-11-11 DIAGNOSIS — E55.9 VITAMIN D DEFICIENCY: ICD-10-CM

## 2020-11-11 DIAGNOSIS — I10 ESSENTIAL HYPERTENSION: ICD-10-CM

## 2020-11-11 DIAGNOSIS — J45.20 MILD INTERMITTENT ASTHMA WITHOUT COMPLICATION: ICD-10-CM
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
## 2020-11-11 LAB — SL AMB POCT HEMOGLOBIN AIC: 7.5 (ref ?–6.5)

## 2020-11-11 PROCEDURE — 99214 OFFICE O/P EST MOD 30 MIN: CPT | Performed by: FAMILY MEDICINE

## 2020-11-11 PROCEDURE — 83036 HEMOGLOBIN GLYCOSYLATED A1C: CPT | Performed by: FAMILY MEDICINE

## 2020-11-11 RX ORDER — INFLUENZA A VIRUS A/MICHIGAN/45/2015 X-275 (H1N1) ANTIGEN (FORMALDEHYDE INACTIVATED), INFLUENZA A VIRUS A/HONG KONG/4801/2014 X-263B (H3N2) ANTIGEN (FORMALDEHYDE INACTIVATED), INFLUENZA B VIRUS B/PHUKET/3073/2013 ANTIGEN (FORMALDEHYDE INACTIVATED), AND INFLUENZA B VIRUS B/BRISBANE/60/2008 ANTIGEN (FORMALDEHYDE INACTIVATED) 15; 15; 15; 15 UG/.5ML; UG/.5ML; UG/.5ML; UG/.5ML
INJECTION, SUSPENSION INTRAMUSCULAR
COMMUNITY
End: 2020-11-11

## 2020-11-13 DIAGNOSIS — E11.65 UNCONTROLLED TYPE 2 DIABETES MELLITUS WITH HYPERGLYCEMIA (HCC): ICD-10-CM

## 2020-11-13 RX ORDER — INSULIN DETEMIR 100 [IU]/ML
20 INJECTION, SOLUTION SUBCUTANEOUS
Qty: 5 PEN | Refills: 3 | Status: SHIPPED | OUTPATIENT
Start: 2020-11-13 | End: 2021-01-08 | Stop reason: SDUPTHER

## 2020-12-07 DIAGNOSIS — E11.65 UNCONTROLLED TYPE 2 DIABETES MELLITUS WITH HYPERGLYCEMIA (HCC): ICD-10-CM

## 2020-12-07 RX ORDER — PEN NEEDLE, DIABETIC 31 GX5/16"
NEEDLE, DISPOSABLE MISCELLANEOUS
Qty: 100 EACH | Refills: 1 | Status: SHIPPED | OUTPATIENT
Start: 2020-12-07 | End: 2021-07-16 | Stop reason: SDUPTHER

## 2021-01-08 DIAGNOSIS — E11.65 UNCONTROLLED TYPE 2 DIABETES MELLITUS WITH HYPERGLYCEMIA (HCC): ICD-10-CM

## 2021-01-08 RX ORDER — INSULIN DETEMIR 100 [IU]/ML
20 INJECTION, SOLUTION SUBCUTANEOUS
Qty: 5 PEN | Refills: 3 | Status: SHIPPED | OUTPATIENT
Start: 2021-01-08 | End: 2021-09-15 | Stop reason: SDUPTHER

## 2021-01-22 ENCOUNTER — IMMUNIZATIONS (OUTPATIENT)
Dept: FAMILY MEDICINE CLINIC | Facility: HOSPITAL | Age: 86
End: 2021-01-22

## 2021-01-22 DIAGNOSIS — Z23 ENCOUNTER FOR IMMUNIZATION: Primary | ICD-10-CM

## 2021-01-22 PROCEDURE — 91301 SARS-COV-2 / COVID-19 MRNA VACCINE (MODERNA) 100 MCG: CPT

## 2021-01-22 PROCEDURE — 0011A SARS-COV-2 / COVID-19 MRNA VACCINE (MODERNA) 100 MCG: CPT

## 2021-02-17 ENCOUNTER — IMMUNIZATIONS (OUTPATIENT)
Dept: FAMILY MEDICINE CLINIC | Facility: HOSPITAL | Age: 86
End: 2021-02-17

## 2021-02-17 DIAGNOSIS — Z23 ENCOUNTER FOR IMMUNIZATION: Primary | ICD-10-CM

## 2021-02-17 PROCEDURE — 91301 SARS-COV-2 / COVID-19 MRNA VACCINE (MODERNA) 100 MCG: CPT

## 2021-02-17 PROCEDURE — 0012A SARS-COV-2 / COVID-19 MRNA VACCINE (MODERNA) 100 MCG: CPT

## 2021-03-02 ENCOUNTER — TELEPHONE (OUTPATIENT)
Dept: FAMILY MEDICINE CLINIC | Facility: CLINIC | Age: 86
End: 2021-03-02

## 2021-03-02 NOTE — TELEPHONE ENCOUNTER
Pt called to cancel her appt but she said that she fell over the weekend and want in the hospital and wanted dr Gerardo Ledesma to know   Also if Dr Claudene Portal wants her to reschedule her appt to let her know

## 2021-03-02 NOTE — TELEPHONE ENCOUNTER
Please call patient  I am sorry to hear about her fall and hope she is doing ok  She can reschedule to see me in the spring or summer  Her bloodwork is up to date    Thank you,  CB

## 2021-04-16 DIAGNOSIS — E11.65 UNCONTROLLED TYPE 2 DIABETES MELLITUS WITH HYPERGLYCEMIA (HCC): ICD-10-CM

## 2021-04-16 RX ORDER — LANCETS
EACH MISCELLANEOUS
Qty: 100 EACH | Refills: 0 | Status: SHIPPED | OUTPATIENT
Start: 2021-04-16 | End: 2021-07-16 | Stop reason: SDUPTHER

## 2021-04-16 RX ORDER — BLOOD SUGAR DIAGNOSTIC
STRIP MISCELLANEOUS
Qty: 100 EACH | Refills: 0 | Status: SHIPPED | OUTPATIENT
Start: 2021-04-16 | End: 2021-07-16 | Stop reason: SDUPTHER

## 2021-05-21 ENCOUNTER — TELEPHONE (OUTPATIENT)
Dept: FAMILY MEDICINE CLINIC | Facility: CLINIC | Age: 86
End: 2021-05-21

## 2021-05-21 NOTE — TELEPHONE ENCOUNTER
Pt has an appt scheduled on 6/16/21 and wanted to know if Dr Jo David wants to send her for labs and A1c too?

## 2021-05-22 DIAGNOSIS — E53.8 VITAMIN B12 DEFICIENCY: ICD-10-CM

## 2021-05-22 DIAGNOSIS — E55.9 VITAMIN D DEFICIENCY: ICD-10-CM

## 2021-05-22 DIAGNOSIS — J45.20 MILD INTERMITTENT ASTHMA WITHOUT COMPLICATION: ICD-10-CM

## 2021-05-22 DIAGNOSIS — D72.829 LEUKOCYTOSIS, UNSPECIFIED TYPE: ICD-10-CM

## 2021-05-22 DIAGNOSIS — E78.2 MIXED HYPERLIPIDEMIA: ICD-10-CM

## 2021-05-22 DIAGNOSIS — C49.A2 GASTROINTESTINAL STROMAL TUMOR (GIST) OF STOMACH (HCC): Primary | ICD-10-CM

## 2021-05-22 DIAGNOSIS — N32.81 OAB (OVERACTIVE BLADDER): ICD-10-CM

## 2021-05-22 DIAGNOSIS — I10 ESSENTIAL HYPERTENSION: ICD-10-CM

## 2021-05-22 DIAGNOSIS — E11.65 UNCONTROLLED TYPE 2 DIABETES MELLITUS WITH HYPERGLYCEMIA (HCC): ICD-10-CM

## 2021-05-22 NOTE — TELEPHONE ENCOUNTER
Thank you  Please notify patient that I have placed bloodwork orders for Mrs Uriarte    Thank you,  CB

## 2021-06-07 ENCOUNTER — APPOINTMENT (OUTPATIENT)
Dept: LAB | Facility: CLINIC | Age: 86
End: 2021-06-07
Payer: MEDICARE

## 2021-06-07 DIAGNOSIS — E11.65 UNCONTROLLED TYPE 2 DIABETES MELLITUS WITH HYPERGLYCEMIA (HCC): ICD-10-CM

## 2021-06-07 DIAGNOSIS — I10 ESSENTIAL HYPERTENSION: ICD-10-CM

## 2021-06-07 DIAGNOSIS — J45.20 MILD INTERMITTENT ASTHMA WITHOUT COMPLICATION: ICD-10-CM

## 2021-06-07 DIAGNOSIS — E78.2 MIXED HYPERLIPIDEMIA: ICD-10-CM

## 2021-06-07 DIAGNOSIS — C49.A2 GASTROINTESTINAL STROMAL TUMOR (GIST) OF STOMACH (HCC): ICD-10-CM

## 2021-06-07 DIAGNOSIS — D72.829 LEUKOCYTOSIS, UNSPECIFIED TYPE: ICD-10-CM

## 2021-06-07 DIAGNOSIS — N32.81 OAB (OVERACTIVE BLADDER): ICD-10-CM

## 2021-06-07 DIAGNOSIS — E55.9 VITAMIN D DEFICIENCY: ICD-10-CM

## 2021-06-07 DIAGNOSIS — E53.8 VITAMIN B12 DEFICIENCY: ICD-10-CM

## 2021-06-07 LAB
25(OH)D3 SERPL-MCNC: 42 NG/ML (ref 30–100)
ALBUMIN SERPL BCP-MCNC: 3.5 G/DL (ref 3.5–5)
ALP SERPL-CCNC: 67 U/L (ref 46–116)
ALT SERPL W P-5'-P-CCNC: 17 U/L (ref 12–78)
ANION GAP SERPL CALCULATED.3IONS-SCNC: 4 MMOL/L (ref 4–13)
AST SERPL W P-5'-P-CCNC: 17 U/L (ref 5–45)
BASOPHILS # BLD AUTO: 0.07 THOUSANDS/ΜL (ref 0–0.1)
BASOPHILS NFR BLD AUTO: 1 % (ref 0–1)
BILIRUB SERPL-MCNC: 0.71 MG/DL (ref 0.2–1)
BUN SERPL-MCNC: 17 MG/DL (ref 5–25)
CALCIUM SERPL-MCNC: 9.4 MG/DL (ref 8.3–10.1)
CHLORIDE SERPL-SCNC: 107 MMOL/L (ref 100–108)
CHOLEST SERPL-MCNC: 145 MG/DL (ref 50–200)
CO2 SERPL-SCNC: 29 MMOL/L (ref 21–32)
CREAT SERPL-MCNC: 0.74 MG/DL (ref 0.6–1.3)
EOSINOPHIL # BLD AUTO: 0.2 THOUSAND/ΜL (ref 0–0.61)
EOSINOPHIL NFR BLD AUTO: 2 % (ref 0–6)
ERYTHROCYTE [DISTWIDTH] IN BLOOD BY AUTOMATED COUNT: 13.2 % (ref 11.6–15.1)
EST. AVERAGE GLUCOSE BLD GHB EST-MCNC: 157 MG/DL
GFR SERPL CREATININE-BSD FRML MDRD: 74 ML/MIN/1.73SQ M
GLUCOSE P FAST SERPL-MCNC: 100 MG/DL (ref 65–99)
HBA1C MFR BLD: 7.1 %
HCT VFR BLD AUTO: 43.8 % (ref 34.8–46.1)
HDLC SERPL-MCNC: 65 MG/DL
HGB BLD-MCNC: 13.7 G/DL (ref 11.5–15.4)
IMM GRANULOCYTES # BLD AUTO: 0.04 THOUSAND/UL (ref 0–0.2)
IMM GRANULOCYTES NFR BLD AUTO: 0 % (ref 0–2)
LDLC SERPL CALC-MCNC: 64 MG/DL (ref 0–100)
LYMPHOCYTES # BLD AUTO: 1.84 THOUSANDS/ΜL (ref 0.6–4.47)
LYMPHOCYTES NFR BLD AUTO: 18 % (ref 14–44)
MCH RBC QN AUTO: 29.1 PG (ref 26.8–34.3)
MCHC RBC AUTO-ENTMCNC: 31.3 G/DL (ref 31.4–37.4)
MCV RBC AUTO: 93 FL (ref 82–98)
MONOCYTES # BLD AUTO: 0.5 THOUSAND/ΜL (ref 0.17–1.22)
MONOCYTES NFR BLD AUTO: 5 % (ref 4–12)
NEUTROPHILS # BLD AUTO: 7.36 THOUSANDS/ΜL (ref 1.85–7.62)
NEUTS SEG NFR BLD AUTO: 74 % (ref 43–75)
NONHDLC SERPL-MCNC: 80 MG/DL
NRBC BLD AUTO-RTO: 0 /100 WBCS
PLATELET # BLD AUTO: 251 THOUSANDS/UL (ref 149–390)
PMV BLD AUTO: 10.3 FL (ref 8.9–12.7)
POTASSIUM SERPL-SCNC: 4.2 MMOL/L (ref 3.5–5.3)
PROT SERPL-MCNC: 6.9 G/DL (ref 6.4–8.2)
RBC # BLD AUTO: 4.71 MILLION/UL (ref 3.81–5.12)
SODIUM SERPL-SCNC: 140 MMOL/L (ref 136–145)
T4 SERPL-MCNC: 9.4 UG/DL (ref 4.7–13.3)
TRIGL SERPL-MCNC: 79 MG/DL
TSH SERPL DL<=0.05 MIU/L-ACNC: 1.56 UIU/ML (ref 0.36–3.74)
VIT B12 SERPL-MCNC: 337 PG/ML (ref 100–900)
WBC # BLD AUTO: 10.01 THOUSAND/UL (ref 4.31–10.16)

## 2021-06-07 PROCEDURE — 85025 COMPLETE CBC W/AUTO DIFF WBC: CPT

## 2021-06-07 PROCEDURE — 84436 ASSAY OF TOTAL THYROXINE: CPT

## 2021-06-07 PROCEDURE — 80061 LIPID PANEL: CPT

## 2021-06-07 PROCEDURE — 83036 HEMOGLOBIN GLYCOSYLATED A1C: CPT

## 2021-06-07 PROCEDURE — 82607 VITAMIN B-12: CPT

## 2021-06-07 PROCEDURE — 84443 ASSAY THYROID STIM HORMONE: CPT

## 2021-06-07 PROCEDURE — 82306 VITAMIN D 25 HYDROXY: CPT

## 2021-06-07 PROCEDURE — 80053 COMPREHEN METABOLIC PANEL: CPT

## 2021-06-07 PROCEDURE — 36415 COLL VENOUS BLD VENIPUNCTURE: CPT

## 2021-06-07 PROCEDURE — 83918 ORGANIC ACIDS TOTAL QUANT: CPT

## 2021-06-11 LAB
METHYLMALONATE SERPL-SCNC: 285 NMOL/L (ref 0–378)
SL AMB DISCLAIMER: NORMAL

## 2021-06-15 RX ORDER — ERGOCALCIFEROL (VITAMIN D2) 1250 MCG
CAPSULE ORAL
COMMUNITY

## 2021-06-15 RX ORDER — LIDOCAINE HYDROCHLORIDE 10 MG/ML
INJECTION, SOLUTION INFILTRATION; PERINEURAL
COMMUNITY

## 2021-06-16 ENCOUNTER — OFFICE VISIT (OUTPATIENT)
Dept: FAMILY MEDICINE CLINIC | Facility: CLINIC | Age: 86
End: 2021-06-16
Payer: MEDICARE

## 2021-06-16 VITALS
BODY MASS INDEX: 33.46 KG/M2 | HEIGHT: 64 IN | DIASTOLIC BLOOD PRESSURE: 76 MMHG | WEIGHT: 196 LBS | HEART RATE: 80 BPM | SYSTOLIC BLOOD PRESSURE: 130 MMHG | RESPIRATION RATE: 16 BRPM

## 2021-06-16 DIAGNOSIS — I10 ESSENTIAL HYPERTENSION: ICD-10-CM

## 2021-06-16 DIAGNOSIS — E78.2 MIXED HYPERLIPIDEMIA: ICD-10-CM

## 2021-06-16 DIAGNOSIS — E55.9 VITAMIN D DEFICIENCY: ICD-10-CM

## 2021-06-16 DIAGNOSIS — C49.A2 GASTROINTESTINAL STROMAL TUMOR (GIST) OF STOMACH (HCC): ICD-10-CM

## 2021-06-16 DIAGNOSIS — E11.65 UNCONTROLLED TYPE 2 DIABETES MELLITUS WITH HYPERGLYCEMIA (HCC): Primary | ICD-10-CM

## 2021-06-16 DIAGNOSIS — R29.898 WEAKNESS OF BOTH LOWER EXTREMITIES: ICD-10-CM

## 2021-06-16 DIAGNOSIS — Z12.31 VISIT FOR SCREENING MAMMOGRAM: ICD-10-CM

## 2021-06-16 DIAGNOSIS — R26.89 BALANCE PROBLEM: ICD-10-CM

## 2021-06-16 PROBLEM — E27.8 LEFT ADRENAL MASS (HCC): Status: ACTIVE | Noted: 2021-06-16

## 2021-06-16 PROCEDURE — 99214 OFFICE O/P EST MOD 30 MIN: CPT | Performed by: FAMILY MEDICINE

## 2021-06-16 PROCEDURE — 1123F ACP DISCUSS/DSCN MKR DOCD: CPT | Performed by: FAMILY MEDICINE

## 2021-06-16 PROCEDURE — G0439 PPPS, SUBSEQ VISIT: HCPCS | Performed by: FAMILY MEDICINE

## 2021-06-16 NOTE — PROGRESS NOTES
Assessment and Plan:      Pleasant and youthful 60-year-old white female presents today to follow-up on chronic medical conditions  She also has blood work to review today  She is doing well with no acute complaints  1  Diabetes mellitus - hemoglobin A1c is improved from 7 5-7 1  She will continue on basal insulin 22 units at bedtime  She has had no hypoglycemic attacks  Diabetic foot exam performed today is within normal limits    2  Gastrointestinal stromal tumor of the stomach - patient continues with conservative approach  She continues to feel well  No further surgical intervention at this time  3  Hyperlipidemia - lipids are under excellent control  Continue simvastatin 10 mg daily    4  Vitamin-D deficiency - continue over-the-counter vitamin D3 supplementation  5  Hypertension - blood pressure is well controlled  Continue benazepril HCT    6  Balance problem and weakness of lower extremities - refer for physical therapy, strengthening and balance  7  Screening for breast cancer - mammogram ordered  Labs reviewed at length with patient today  Return to office in 3-4 months  Subjective:      Patient ID: Jakub Miller is a 80 y o  female  CC:    Chief Complaint   Patient presents with    Follow-up     Pt here for a follow up and to review lab results  R cruz  Medicare Wellness Visit       HPI:    Nicole and youthful 60-year-old white female presents today to follow-up on chronic medical conditions  She also has blood work to review today  She is doing well with no acute complaints      No meds for overactive bladder  Insulin 22u at bedtime        The following portions of the patient's history were reviewed and updated as appropriate: allergies, current medications, past family history, past medical history, past social history, past surgical history and problem list       Review of Systems   Constitutional:        See HPI   HENT: Negative for congestion, ear pain, mouth sores, sinus pressure and trouble swallowing  Eyes: Negative for discharge, redness and itching  Respiratory: Negative for apnea, cough, chest tightness, shortness of breath, wheezing and stridor  Cardiovascular: Negative for chest pain, palpitations and leg swelling  Gastrointestinal: Negative for abdominal distention, abdominal pain, blood in stool, constipation, diarrhea, nausea and vomiting  Endocrine: Negative for cold intolerance and heat intolerance  Genitourinary: Negative for difficulty urinating, dysuria, flank pain and urgency  Patient has overactive bladder but is not using any medications at the present time  She states that by using her diuretic in the morning, this cuts down on her needing to go to the bathroom throughout the night  Musculoskeletal: Negative for arthralgias and myalgias  Skin: Negative for rash  Neurological: Negative for dizziness, seizures, syncope, speech difficulty, weakness, light-headedness, numbness and headaches  Hematological: Negative for adenopathy  Psychiatric/Behavioral: Negative for agitation, behavioral problems, confusion and sleep disturbance  The patient is not nervous/anxious  Data to review:       Objective:    Vitals:    06/16/21 1032   BP: 130/76   BP Location: Left arm   Patient Position: Sitting   Cuff Size: Large   Pulse: 80   Resp: 16   Weight: 88 9 kg (196 lb)   Height: 5' 4" (1 626 m)        Physical Exam  Vitals and nursing note reviewed  Constitutional:       General: She is not in acute distress  Appearance: Normal appearance  She is well-developed  She is obese  She is not ill-appearing, toxic-appearing or diaphoretic  Comments: Patient is talkative and a very good historian  She is well groomed and in good spirits  HENT:      Head: Normocephalic and atraumatic  Eyes:      General: No scleral icterus       Conjunctiva/sclera: Conjunctivae normal    Neck:      Vascular: No carotid bruit  Cardiovascular:      Rate and Rhythm: Normal rate and regular rhythm  Pulses:           Dorsalis pedis pulses are 2+ on the right side and 2+ on the left side  Posterior tibial pulses are 2+ on the right side and 2+ on the left side  Heart sounds: No murmur heard  Pulmonary:      Effort: Pulmonary effort is normal  No respiratory distress  Breath sounds: Normal breath sounds  No wheezing or rales  Musculoskeletal:      Cervical back: Neck supple  Right lower leg: No edema  Left lower leg: No edema  Feet:      Right foot:      Skin integrity: No ulcer, skin breakdown, erythema, warmth, callus or dry skin  Left foot:      Skin integrity: No ulcer, skin breakdown, erythema, warmth, callus or dry skin  Skin:     General: Skin is warm and dry  Coloration: Skin is not jaundiced  Neurological:      General: No focal deficit present  Mental Status: She is alert  Mental status is at baseline  Psychiatric:         Mood and Affect: Mood normal          Behavior: Behavior normal          Thought Content: Thought content normal          Judgment: Judgment normal            BMI Counseling: Body mass index is 33 64 kg/m²  The BMI is above normal  Nutrition recommendations include decreasing portion sizes, encouraging healthy choices of fruits and vegetables and decreasing fast food intake  Exercise recommendations include exercising 3-5 times per week  Diabetic Foot Exam    Patient's shoes and socks removed  Right Foot/Ankle   Right Foot Inspection  Skin Exam: skin normal and skin intact no dry skin, no warmth, no callus, no erythema, no maceration, no abnormal color, no pre-ulcer, no ulcer and no callus                          Toe Exam: ROM and strength within normal limits  Sensory     Proprioception: intact   Monofilament testing: intact  Vascular  Capillary refills: < 3 seconds  The right DP pulse is 2+  The right PT pulse is 2+       Left Foot/Ankle  Left Foot Inspection  Skin Exam: skin normal and skin intactno dry skin, no warmth, no erythema, no maceration, normal color, no pre-ulcer, no ulcer and no callus                         Toe Exam: ROM and strength within normal limits                   Sensory     Proprioception: intact  Monofilament: intact  Vascular  Capillary refills: < 3 seconds  The left DP pulse is 2+  The left PT pulse is 2+  Assign Risk Category:  No deformity present;  No loss of protective sensation;        Risk: 0

## 2021-06-16 NOTE — PROGRESS NOTES
Assessment and Plan:       Patient presents for annual wellness Medicare questionnaire  1  Advanced directives reviewed  Five wishes given  2  Lab work is up-to-date and reviewed with patient today   3  Patient notes some lower extremity weakness and difficulties with balance - she is referred for physical therapy  4  For full evaluation of chronic medical conditions, please see today's office note  Problem List Items Addressed This Visit        Digestive    Gastrointestinal stromal tumor (GIST) of stomach ([facility] [address] )       Endocrine    Uncontrolled type 2 diabetes mellitus with hyperglycemia, with long-term current use of insulin ([facility] [address] ) - Primary       Cardiovascular and Mediastinum    Essential hypertension       Other    Mixed hyperlipidemia    Vitamin D deficiency           Preventive health issues were discussed with patient, and age appropriate screening tests were ordered as noted in patient's After Visit Summary  Personalized health advice and appropriate referrals for health education or preventive services given if needed, as noted in patient's After Visit Summary  History of Present Illness:     Patient presents for Medicare Annual Wellness visit    Patient Care Team:  Shelbie Buck MD as PCP - General     Problem List:     Patient Active Problem List   Diagnosis    Essential hypertension    Mixed hyperlipidemia    Uncontrolled type 2 diabetes mellitus with hyperglycemia, with long-term current use of insulin ([facility] [address] )    Asthma    Vitamin D deficiency    Osteoarthritis    Obesity (BMI 30-39  9)    OAB (overactive bladder)    Hx of pulmonary embolus    Carotid artery stenosis    Ambulatory dysfunction    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)    Left hip pain    Personal history of malignant neoplasm of uterus      Past Medical and Surgical History:     Past Medical History:   Diagnosis Date    Cellulitis     last assessed 05/22/2013    DVT (deep venous thrombosis) ([facility] ) last assessed 07/23/2014    Hyperglycemia     last assessed 05/08/2013    Poorly controlled type 2 diabetes mellitus ([facility] [address] )     last assessed 03/27/2014    Pulmonary embolism, bilateral ([facility] [address] )     resolved 07/23/2014     Past Surgical History:   Procedure Laterality Date    COLECTOMY      partial, last assessed 07/23/2014    HYSTERECTOMY  09/1974    OOPHORECTOMY      TONSILLECTOMY      TOTAL HIP ARTHROPLASTY Bilateral     Once on the left and 3 times on the right side  [place]    TOTAL KNEE ARTHROPLASTY Right     last assessed 01/13/2016, managed by German Neff MD      Family History:     Family History   Problem Relation Age of Onset    Bone cancer Mother     Lung cancer Father     Multiple sclerosis Sister     COPD Brother     Breast cancer Maternal Grandmother     Mental illness Family       Social History:     Social History     Socioeconomic History    Marital status:      Spouse name: None    Number of children: None    Years of education: None    Highest education level: None   Occupational History    None   Tobacco Use    Smoking status: Never Smoker    Smokeless tobacco: Never Used    Tobacco comment: No secondhand smoke exposure   Substance and Sexual Activity    Alcohol use: Yes     Comment: socially    Drug use: No    Sexual activity: None   Other Topics Concern    None   Social History Narrative    Lives with significant other     Social Determinants of Health     Financial Resource Strain:     Difficulty of Paying Living Expenses:    Food Insecurity:     Worried About Running Out of Food in the Last Year:     [address] in the Last Year:    Transportation Needs:     Lack of Transportation (Medical):      Lack of Transportation (Non-Medical):    Physical Activity:     Days of Exercise per Week:     Minutes of Exercise per Session:    Stress:     Feeling of Stress :    Social Connections:     Frequency of Communication with Friends and Family:     Frequency of Social Gatherings with Friends and Family:     Attends [employer] Services:     Active Member of Clubs or Organizations:     Attends Club or Organization Meetings:     Marital Status:    Intimate Partner Violence:     Fear of Current or Ex-Partner:     Emotionally Abused:     Physically Abused:     Sexually Abused:       Medications and Allergies:     Current Outpatient Medications   Medication Sig Dispense Refill    acetaminophen (TYLENOL) 500 mg tablet Take 1,000 mg by mouth      aspirin 81 MG tablet Take 81 mg by mouth      B-D UF III MINI PEN NEEDLES 31G X 5 MM MISC USE ONCE DAILY WITH LANTUS PEN 90 each 3    B-D UF III MINI PEN NEEDLES 31G X 5 MM MISC USE DAILY WITH INSULIN 100 each 1    benazepril-hydrochlorthiazide (LOTENSIN HCT) 10-12 5 MG per tablet TAKE ONE TABLET BY MOUTH EVERY DAY 90 tablet 3    Biotin 1 MG CAPS Take 1 mg by mouth daily      ergocalciferol (ERGOCALCIFEROL) 1 25 MG (95103 UT) capsule Vitamin D2 1,250 mcg (50,000 unit) capsule   TAKE ONE CAPSULE BY MOUTH THREE TIMES A WEEK      fluticasone (FLONASE) 50 mcg/act nasal spray 1 spray into each nostril      fluticasone-salmeterol (Advair Diskus) 250-50 mcg/dose inhaler Inhale 1 puff 2 (two) times a day 1 Inhaler 5    insulin detemir (Levemir FlexTouch) 100 Units/mL injection pen Inject 20 Units under the skin daily at bedtime Inject 22 units under the skin @ bedtime  5 pen 3    Lancets (onetouch ultrasoft) lancets TEST ONCE TO TWICE DAILY 100 each 0    lidocaine (XYLOCAINE) 1 % lidocaine HCl 10 mg/mL (1 %) injection solution   Take by injection route        Multiple Vitamins-Minerals (HAIR SKIN AND NAILS FORMULA PO) Take 1 capsule by mouth daily      OneTouch Ultra test strip TEST ONCE TO TWICE DAILY 100 each 0    psyllium (METAMUCIL) 58 6 % powder Take 1 packet by mouth daily      simvastatin (ZOCOR) 10 mg tablet TAKE ONE TABLET(S) DAILY AS DIRECTED 90 tablet 3     No current facility-administered medications for this visit  Allergies   Allergen Reactions    Codeine Nausea Only    Doxycycline     Erythromycin      Other reaction(s): GI upset  diarrhea    Levofloxacin      Category: Adverse Reaction; Annotation - [id number]: Tendon rupture    Penicillins       Immunizations:     Immunization History   Administered Date(s) Administered    INFLUENZA 10/07/2015, 10/19/2018    Influenza Quadrivalent Preservative Free 3 years and older IM 10/17/2014    Influenza Split High Dose Preservative Free IM 10/07/2015, 11/08/2016, 09/15/2017    Influenza, high dose seasonal 0 7 mL 10/22/2020    Influenza, injectable, quadrivalent, preservative free 0 5 mL 10/03/2019    Influenza, seasonal, injectable 10/08/2009, 10/19/2010, 09/22/2011, 10/09/2012    Pneumococcal Polysaccharide PPV23 08/17/2015    SARS-CoV-2 / COVID-19 mRNA IM ([employer]) 01/22/2021, 02/17/2021    Zoster 06/01/2012    influenza, trivalent, adjuvanted 10/19/2018      Health Maintenance: There are no preventive care reminders to display for this patient  Topic Date Due    DTaP,Tdap,and Td Vaccines (1 - Tdap) Never done      Medicare Health Risk Assessment:     LMP  (LMP Unknown)      Maria Eugenia Way is here for her Subsequent Wellness visit  Health Risk Assessment:   Patient rates overall health as good  Patient feels that their physical health rating is same  Patient is satisfied with their life  Eyesight was rated as same  Hearing was rated as slightly worse  Patient feels that their emotional and mental health rating is same  Patients states they are sometimes angry  Patient states they are never, rarely unusually tired/fatigued  Pain experienced in the last 7 days has been none  Patient states that she has experienced no weight loss or gain in last 6 months  Depression Screening:   PHQ-2 Score: 0      Fall Risk Screening:    In the past year, patient has experienced: no history of falling in past year      Urinary Incontinence Screening: Patient has leaked urine accidently in the last six months  Home Safety:  Patient has trouble with stairs inside or outside of their home  Patient has working smoke alarms and has working carbon monoxide detector  Home safety hazards include: none  Nutrition:   Current diet is Diabetic  Medications:   Patient is currently taking over-the-counter supplements  OTC medications include: see medication list  Patient is able to manage medications  Activities of Daily Living (ADLs)/Instrumental Activities of Daily Living (IADLs):   Walk and transfer into and out of bed and chair?: Yes  Dress and groom yourself?: Yes    Bathe or shower yourself?: Yes    Feed yourself? Yes  Do your laundry/housekeeping?: Yes  Manage your money, pay your bills and track your expenses?: Yes  Make your own meals?: Yes    Do your own shopping?: Yes    Previous Hospitalizations:   Any hospitalizations or ED visits within the last 12 months?: No      Advance Care Planning:   Living will: Yes    Durable POA for healthcare:  Yes    Advanced directive: Yes    Advanced directive counseling given: Yes    Five wishes given: Yes    End of Life Decisions reviewed with patient: Yes      Cognitive Screening:   Provider or family/friend/caregiver concerned regarding cognition?: No    PREVENTIVE SCREENINGS      Cardiovascular Screening:    General: Screening Not Indicated, History Lipid Disorder and Screening Current      Diabetes Screening:     General: Screening Not Indicated and History Diabetes      Colorectal Cancer Screening:     General: Screening Not Indicated      Breast Cancer Screening:       Due for: Mammogram        Cervical Cancer Screening:    General: Screening Not Indicated      Osteoporosis Screening:    General: Screening Not Indicated      Abdominal Aortic Aneurysm (AAA) Screening:        General: Screening Not Indicated      Lung Cancer Screening:     General: Screening Not Indicated    Screening, Brief Intervention, and Referral to Treatment (SBIRT)    Screening  Typical number of drinks in a day: 0  Typical number of drinks in a week: 0  Interpretation: Low risk drinking behavior  Single Item Drug Screening:  How often have you used an illegal drug (including marijuana) or a prescription medication for non-medical reasons in the past year? never    Single Item Drug Screen Score: 0  Interpretation: Negative screen for possible drug use disorder    Other Counseling Topics:   Calcium and vitamin D intake and regular weightbearing exercise         Annika Mike MD

## 2021-06-17 ENCOUNTER — TELEPHONE (OUTPATIENT)
Dept: ADMINISTRATIVE | Facility: OTHER | Age: 86
End: 2021-06-17

## 2021-06-17 NOTE — LETTER
Diabetic Eye Exam Form  2nd Request    Date Requested: 21  Patient: Marcos Oseguera  Patient : 1934   Referring Provider: Koby Burk MD    Dilated Retinal Exam, Optomap-Iris Exam, or Fundus Photography Done         Yes ([place] one above)         No     Date of Diabetic Eye Exam ______________________________  Left Eye      Exam did show retinopathy    Exam did not show retinopathy         Mild       Moderate       None       Proliferative       Severe     Right Eye     Exam did show retinopathy    Exam did not show retinopathy         Mild       Moderate       None       Proliferative       Severe     Comments __________________________________________________________    Practice Providing Exam ______________________________________________    Exam Performed By (print name) _______________________________________      Provider Signature ___________________________________________________      These reports are needed for  compliance    Please fax this completed form and a copy of the Diabetic Eye Exam report to our office located at [place] [address] as soon as possible to [phone] ramesh Rolon: Phone [phone]    We thank you for your assistance in treating our mutual patient

## 2021-06-17 NOTE — LETTER
Diabetic Eye Exam Form    Date Requested: 21  Patient: Ted Rivera  Patient : 1934   Referring Provider: Kaiden York MD    Dilated Retinal Exam, Optomap-Iris Exam, or Fundus Photography Done         Yes ([place] one above)         No     Date of Diabetic Eye Exam ______________________________  Left Eye      Exam did show retinopathy    Exam did not show retinopathy         Mild       Moderate       None       Proliferative       Severe     Right Eye     Exam did show retinopathy    Exam did not show retinopathy         Mild       Moderate       None       Proliferative       Severe     Comments __________________________________________________________    Practice Providing Exam ______________________________________________    Exam Performed By (print name) _______________________________________      Provider Signature ___________________________________________________      These reports are needed for  compliance    Please fax this completed form and a copy of the Diabetic Eye Exam report to our office located at [place] [address] as soon as possible to [phone] attention Magda Ates: Phone [phone]    We thank you for your assistance in treating our mutual patient

## 2021-06-17 NOTE — TELEPHONE ENCOUNTER
Upon review of the In Basket request and the patient's chart, initial outreach has been made via fax, please see Contacts section for details       Thank you  Mook Castro

## 2021-06-17 NOTE — TELEPHONE ENCOUNTER
----- Message from Gary Hoffman sent at 6/16/2021 12:32 PM EDT -----  Regarding: eye exam  06/16/21 12:33 PM    Hello, our patient Dasia Chowdhury has had Diabetic Eye Exam completed/performed  Please assist in updating the patient chart by making an External outreach to  eye center with dr Dolores Castañeda  facility located in [place], [place]  The date of service last week of June 2021      Thank you,  Yas David MA  [facility]

## 2021-06-21 NOTE — TELEPHONE ENCOUNTER
Upon review of the In Basket request we were able to locate, review, and update the patient chart as requested for Diabetic Eye Exam      Patient was last see 2019 which I updated HM  According to office patient has an upcoming appointment scheduled 8/10/2021    Any additional questions or concerns should be emailed to the Practice Liaisons via [email or web address] email, please do not reply via In Basket      Thank you  Shellie Pallas

## 2021-06-21 NOTE — TELEPHONE ENCOUNTER
As a follow-up, a second attempt has been made for outreach via fax, please see Contacts section for details      Thank you  Shellie Pallas

## 2021-07-16 DIAGNOSIS — E11.65 UNCONTROLLED TYPE 2 DIABETES MELLITUS WITH HYPERGLYCEMIA (HCC): ICD-10-CM

## 2021-07-17 RX ORDER — BLOOD SUGAR DIAGNOSTIC
STRIP MISCELLANEOUS
Qty: 100 STRIP | Refills: 0 | Status: SHIPPED | OUTPATIENT
Start: 2021-07-17 | End: 2022-01-19

## 2021-07-17 RX ORDER — PEN NEEDLE, DIABETIC 31 GX5/16"
NEEDLE, DISPOSABLE MISCELLANEOUS 4 TIMES DAILY
Qty: 100 EACH | Refills: 3 | Status: SHIPPED | OUTPATIENT
Start: 2021-07-17 | End: 2022-07-20 | Stop reason: SDUPTHER

## 2021-07-17 RX ORDER — BLOOD SUGAR DIAGNOSTIC
1 STRIP MISCELLANEOUS 2 TIMES DAILY
Qty: 100 EACH | Refills: 3 | Status: SHIPPED | OUTPATIENT
Start: 2021-07-17

## 2021-07-17 RX ORDER — PEN NEEDLE, DIABETIC 31 GX5/16"
NEEDLE, DISPOSABLE MISCELLANEOUS
Qty: 100 EACH | Refills: 1 | Status: SHIPPED | OUTPATIENT
Start: 2021-07-17 | End: 2022-01-19

## 2021-07-17 RX ORDER — LANCETS
EACH MISCELLANEOUS
Qty: 100 EACH | Refills: 3 | Status: SHIPPED | OUTPATIENT
Start: 2021-07-17 | End: 2022-01-19

## 2021-07-17 RX ORDER — LANCETS
EACH MISCELLANEOUS
Qty: 100 EACH | Refills: 0 | Status: SHIPPED | OUTPATIENT
Start: 2021-07-17 | End: 2021-10-12 | Stop reason: SDUPTHER

## 2021-09-05 DIAGNOSIS — E78.00 PURE HYPERCHOLESTEROLEMIA: ICD-10-CM

## 2021-09-05 DIAGNOSIS — I10 ESSENTIAL HYPERTENSION: ICD-10-CM

## 2021-09-07 RX ORDER — SIMVASTATIN 10 MG
TABLET ORAL
Qty: 90 TABLET | Refills: 3 | Status: SHIPPED | OUTPATIENT
Start: 2021-09-07

## 2021-09-15 ENCOUNTER — TELEPHONE (OUTPATIENT)
Dept: FAMILY MEDICINE CLINIC | Facility: CLINIC | Age: 86
End: 2021-09-15

## 2021-09-15 DIAGNOSIS — E11.65 UNCONTROLLED TYPE 2 DIABETES MELLITUS WITH HYPERGLYCEMIA (HCC): ICD-10-CM

## 2021-09-15 RX ORDER — INSULIN DETEMIR 100 [IU]/ML
INJECTION, SOLUTION SUBCUTANEOUS
Qty: 15 ML | Refills: 3 | Status: SHIPPED | OUTPATIENT
Start: 2021-09-15 | End: 2022-07-20 | Stop reason: SDUPTHER

## 2021-09-15 RX ORDER — INSULIN DETEMIR 100 [IU]/ML
20 INJECTION, SOLUTION SUBCUTANEOUS
Qty: 3 ML | Refills: 0 | Status: SHIPPED | OUTPATIENT
Start: 2021-09-15 | End: 2021-09-15 | Stop reason: SDUPTHER

## 2021-09-15 NOTE — TELEPHONE ENCOUNTER
CB , Pharmacy states that there are 2 sets of-directions for pts Rx for Levemir, What directions are the right directions?

## 2021-09-15 NOTE — TELEPHONE ENCOUNTER
I only see one order in the chart  She is on Levemir 22u at bedtime  I will resend to pharmacy    Thank you,  CB

## 2021-09-21 ENCOUNTER — OFFICE VISIT (OUTPATIENT)
Dept: FAMILY MEDICINE CLINIC | Facility: CLINIC | Age: 86
End: 2021-09-21
Payer: MEDICARE

## 2021-09-21 DIAGNOSIS — E11.65 UNCONTROLLED TYPE 2 DIABETES MELLITUS WITH HYPERGLYCEMIA, WITH LONG-TERM CURRENT USE OF INSULIN (HCC): Primary | ICD-10-CM

## 2021-09-21 DIAGNOSIS — E55.9 VITAMIN D DEFICIENCY: ICD-10-CM

## 2021-09-21 DIAGNOSIS — I10 ESSENTIAL HYPERTENSION: ICD-10-CM

## 2021-09-21 DIAGNOSIS — Z79.4 UNCONTROLLED TYPE 2 DIABETES MELLITUS WITH HYPERGLYCEMIA, WITH LONG-TERM CURRENT USE OF INSULIN (HCC): Primary | ICD-10-CM

## 2021-09-21 DIAGNOSIS — Z23 ENCOUNTER FOR IMMUNIZATION: ICD-10-CM

## 2021-09-21 DIAGNOSIS — E78.2 MIXED HYPERLIPIDEMIA: ICD-10-CM

## 2021-09-21 LAB — SL AMB POCT HEMOGLOBIN AIC: 7.5 (ref ?–6.5)

## 2021-09-21 PROCEDURE — 90662 IIV NO PRSV INCREASED AG IM: CPT | Performed by: FAMILY MEDICINE

## 2021-09-21 PROCEDURE — 99214 OFFICE O/P EST MOD 30 MIN: CPT | Performed by: FAMILY MEDICINE

## 2021-09-21 PROCEDURE — 83036 HEMOGLOBIN GLYCOSYLATED A1C: CPT | Performed by: FAMILY MEDICINE

## 2021-09-21 PROCEDURE — G0008 ADMIN INFLUENZA VIRUS VAC: HCPCS | Performed by: FAMILY MEDICINE

## 2021-09-21 NOTE — PROGRESS NOTES
Assessment and Plan:    Nicole and youthful 80year-old presents today to f/u on chronic medical conditions  She is doing quite well  She takes care of her  who has had a recent stroke  1  DM II - A1C is 7 5  Continue Levemir 22u qHS  No episodes of hypoglycemia noted  2  Lipids -stable on simvastatin 10mg daily    3  HTN - controlled; cont  Ren/HCT    4  Vit D Def - cont with OTC Vit D supplementation    5  HCM - Influenza vaccine today  F/U 3-4 months  Subjective:      Patient ID: Sohail Sofia is a 80 y o  female  CC:    Chief Complaint   Patient presents with    Follow-up    Diabetes       HPI:    Nicole and youthful 49-year-old presents today to f/u on chronic medical conditions  She is doing quite well  She takes care of her  who has had a recent stroke  The following portions of the patient's history were reviewed and updated as appropriate: allergies, current medications, past family history, past medical history, past social history, past surgical history and problem list       Review of Systems   Constitutional:        See HPI   HENT: Negative for congestion, ear pain, mouth sores, sinus pressure and trouble swallowing  Eyes: Negative for discharge, redness and itching  Respiratory: Negative for apnea, cough, chest tightness, shortness of breath, wheezing and stridor  Cardiovascular: Negative for chest pain, palpitations and leg swelling  Gastrointestinal: Negative for abdominal distention, abdominal pain, blood in stool, constipation, diarrhea, nausea and vomiting  Endocrine: Negative for cold intolerance and heat intolerance  Genitourinary: Negative for difficulty urinating, dysuria, flank pain and urgency  Musculoskeletal: Negative for arthralgias and myalgias  Skin: Negative for rash  Neurological: Negative for dizziness, seizures, syncope, speech difficulty, weakness, light-headedness, numbness and headaches     Hematological: Negative for adenopathy  Psychiatric/Behavioral: Negative for agitation, behavioral problems, confusion and sleep disturbance  The patient is not nervous/anxious  Data to review:       Objective:       Physical Exam  Constitutional:       General: She is not in acute distress  Appearance: She is well-developed  Comments: Pt appears younger than stated age; well groomed; good historian   HENT:      Head: Normocephalic and atraumatic  Eyes:      General: No scleral icterus  Conjunctiva/sclera: Conjunctivae normal    Neck:      Vascular: No carotid bruit  Cardiovascular:      Rate and Rhythm: Normal rate and regular rhythm  Heart sounds: Normal heart sounds  No murmur heard  No friction rub  No gallop  Pulmonary:      Effort: Pulmonary effort is normal  No respiratory distress  Breath sounds: Normal breath sounds  No wheezing or rales  Musculoskeletal:         General: Normal range of motion  Cervical back: Normal range of motion and neck supple  Right lower leg: No edema  Left lower leg: No edema  Lymphadenopathy:      Cervical: No cervical adenopathy  Skin:     General: Skin is warm and dry  Neurological:      General: No focal deficit present  Mental Status: She is alert and oriented to person, place, and time  Gait: Gait abnormal       Comments: Uses cane for added stability  Psychiatric:         Mood and Affect: Mood normal          Behavior: Behavior normal          Thought Content:  Thought content normal          Judgment: Judgment normal

## 2021-10-07 NOTE — PROGRESS NOTES
Pt came in today to discuss change in her pharmacy coverage of Lantus  She received a letter from [facility] her they will no longer cover Lantus  She does not know what they will cover is requesting we apply for an exemption  Told pt I would discuss with our prescription tech and she would receive a phone call as to the results  Pt is comfortable using Lantus and she has successfully transitioned from 10 units to 20 units daily  However, her fasting blood sugars are ranging from the 140's to 180's  She has an appt with CB on 3/13/19 for follow up  Pt was also informed of an up-coming  [facility] Diabetes Day and encouraged to attend  --bb
118.8

## 2021-10-12 DIAGNOSIS — E11.65 UNCONTROLLED TYPE 2 DIABETES MELLITUS WITH HYPERGLYCEMIA (HCC): ICD-10-CM

## 2021-10-12 RX ORDER — LANCETS
EACH MISCELLANEOUS
Qty: 100 EACH | Refills: 0 | Status: SHIPPED | OUTPATIENT
Start: 2021-10-12 | End: 2022-01-19

## 2021-10-12 RX ORDER — LANCETS
EACH MISCELLANEOUS
Qty: 100 EACH | Refills: 1 | Status: SHIPPED | OUTPATIENT
Start: 2021-10-12 | End: 2022-05-17

## 2021-11-01 LAB
LEFT EYE DIABETIC RETINOPATHY: NORMAL
RIGHT EYE DIABETIC RETINOPATHY: NORMAL
SEVERITY (EYE EXAM): NORMAL

## 2021-12-08 ENCOUNTER — TELEMEDICINE (OUTPATIENT)
Dept: FAMILY MEDICINE CLINIC | Facility: CLINIC | Age: 86
End: 2021-12-08
Payer: MEDICARE

## 2021-12-08 VITALS — BODY MASS INDEX: 33.8 KG/M2 | WEIGHT: 198 LBS | HEIGHT: 64 IN

## 2021-12-08 DIAGNOSIS — J45.20 MILD INTERMITTENT ASTHMA WITHOUT COMPLICATION: ICD-10-CM

## 2021-12-08 DIAGNOSIS — R05.9 COUGH: Primary | ICD-10-CM

## 2021-12-08 PROCEDURE — U0005 INFEC AGEN DETEC AMPLI PROBE: HCPCS | Performed by: PHYSICIAN ASSISTANT

## 2021-12-08 PROCEDURE — 99442 PR PHYS/QHP TELEPHONE EVALUATION 11-20 MIN: CPT | Performed by: PHYSICIAN ASSISTANT

## 2021-12-08 PROCEDURE — U0003 INFECTIOUS AGENT DETECTION BY NUCLEIC ACID (DNA OR RNA); SEVERE ACUTE RESPIRATORY SYNDROME CORONAVIRUS 2 (SARS-COV-2) (CORONAVIRUS DISEASE [COVID-19]), AMPLIFIED PROBE TECHNIQUE, MAKING USE OF HIGH THROUGHPUT TECHNOLOGIES AS DESCRIBED BY CMS-2020-01-R: HCPCS | Performed by: PHYSICIAN ASSISTANT

## 2021-12-08 RX ORDER — DEXTROMETHORPHAN HYDROBROMIDE AND PROMETHAZINE HYDROCHLORIDE 15; 6.25 MG/5ML; MG/5ML
SOLUTION ORAL
Qty: 120 ML | Refills: 0 | Status: SHIPPED | OUTPATIENT
Start: 2021-12-08 | End: 2022-01-19

## 2022-01-11 ENCOUNTER — TELEMEDICINE (OUTPATIENT)
Dept: FAMILY MEDICINE CLINIC | Facility: CLINIC | Age: 87
End: 2022-01-11
Payer: MEDICARE

## 2022-01-11 DIAGNOSIS — E11.65 UNCONTROLLED TYPE 2 DIABETES MELLITUS WITH HYPERGLYCEMIA, WITH LONG-TERM CURRENT USE OF INSULIN (HCC): ICD-10-CM

## 2022-01-11 DIAGNOSIS — Z79.4 UNCONTROLLED TYPE 2 DIABETES MELLITUS WITH HYPERGLYCEMIA, WITH LONG-TERM CURRENT USE OF INSULIN (HCC): ICD-10-CM

## 2022-01-11 DIAGNOSIS — J45.20 MILD INTERMITTENT ASTHMA WITHOUT COMPLICATION: ICD-10-CM

## 2022-01-11 DIAGNOSIS — C49.A2 GASTROINTESTINAL STROMAL TUMOR (GIST) OF STOMACH (HCC): ICD-10-CM

## 2022-01-11 DIAGNOSIS — E27.8 LEFT ADRENAL MASS (HCC): ICD-10-CM

## 2022-01-11 DIAGNOSIS — I10 ESSENTIAL HYPERTENSION: ICD-10-CM

## 2022-01-11 DIAGNOSIS — J20.9 ACUTE BRONCHITIS, UNSPECIFIED ORGANISM: Primary | ICD-10-CM

## 2022-01-11 PROCEDURE — 99442 PR PHYS/QHP TELEPHONE EVALUATION 11-20 MIN: CPT | Performed by: FAMILY MEDICINE

## 2022-01-11 RX ORDER — AZITHROMYCIN 250 MG/1
TABLET, FILM COATED ORAL
Qty: 6 TABLET | Refills: 0 | Status: SHIPPED | OUTPATIENT
Start: 2022-01-11 | End: 2022-01-16

## 2022-01-11 RX ORDER — METHYLPREDNISOLONE 4 MG/1
TABLET ORAL
Qty: 21 EACH | Refills: 0 | Status: SHIPPED | OUTPATIENT
Start: 2022-01-11 | End: 2022-01-19

## 2022-01-11 NOTE — PROGRESS NOTES
Virtual Regular Visit    Patient presents today with acute as well as chronic conditions to discuss  The intention was to perform a video consultation, however, patient does not have video capability and therefore this was conducted via telephone  Verification of patient location:    Patient is located in the following state in which I hold an active license [place]      Assessment/Plan:    59-year-old female presents today for a virtual visit  She has a cough and URI symptoms, present for 3 weeks  Status post negative COVID test     1  Acute bronchitis - start Z-Heath as well as Medrol Dosepak  Increase fluids  Counseled that she may have some mild diarrhea the 1st day  Follow-up in 1 week for in office visit to evaluate lung exam   Patient knows to call me if symptoms worsen or persist     2  Mild intermittent asthma - she has been maintained on Advair 1 puff daily  However, she has a mild exacerbation at present  She is instructed to increase Advair to b i d  Josephine Owen Refill given today  3  Diabetes mellitus - fasting sugars usually around 20  Last hemoglobin A1c was 7 5  Patient instructed to increase her fluid intake  Try to minimize sweets and carbohydrates  Continue Levemir  Labs will be ordered when she is feeling better  4  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  No further intervention at present time  5  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 intervention or evaluation  6  Hypertension - patient does not have vitals to report today  She remains on benazepril HCT 10-12 5 milligrams daily  Patient is due for annual blood work in June  Follow-up in office in 1 week          Reason for visit is   Chief Complaint   Patient presents with    Follow-up     Routine Follow up    Diabetes    Cough     Pt has had a cough and congestion x 1 month, She was tested on 12/8/2021 and it was negative and she still has this cough, she is using the inhalers everyday and it is not helping and she states the inhaler is causing her to have slight loss of voice  Pt has been using Vicks at night times  She states she has been blowing her nose and coughing and all mucous is think and clear   Virtual Regular Visit        Encounter provider Arielle Roberts MD    Provider located at [address] [place] [address]  [id number] [place] [address] [address] [place] [id number] [phone]      Recent Visits  No visits were found meeting these conditions  Showing recent visits within past 7 days and meeting all other requirements  Today's Visits  Date Type Provider Dept   01/11/22 MD [facility]   Showing today's visits and meeting all other requirements  Future Appointments  No visits were found meeting these conditions  Showing future appointments within next 150 days and meeting all other requirements       The patient was identified by name and date of birth  Savannah Allen was informed that this is a telemedicine visit and that the visit is being conducted through Telephone  My office door was closed  No one else was in the room  She acknowledged consent and understanding of privacy and security of the video platform  The patient has agreed to participate and understands they can discontinue the visit at any time  Patient is aware this is a billable service  Subjective  Savannah Allen is a 80 y o  female  She presents today to follow-up on some chronic medical conditions but has an acute problem today  She has a chronic cough that has been present for 3 weeks  At that time, on December 28, a COVID test was performed and was negative  She was given cough medication which gave her diarrhea so she stopped taking it  She notes that coughing persists, mucus is mostly clear  She denies fever, headache, loss of taste or smell  She notes a raspy voice  She has been using Advair 1 puff in the morning    She denies lower extremity edema, shortness of breath  She continues to eat check her sugars on a daily basis  Yesterday was 120  Her last hemoglobin A1c was 7 5  Carrol Mcburney is a 80 y o  female  She presents today to follow-up on some chronic medical conditions but has an acute problem today  She has a chronic cough that has been present for 3 weeks  At that time, on December 28, a COVID test was performed and was negative  She was given cough medication which gave her diarrhea so she stopped taking it  She notes that coughing persists, mucus is mostly clear  She denies fever, headache, loss of taste or smell  She notes a raspy voice  She has been using Advair 1 puff in the morning  She denies lower extremity edema, shortness of breath  She continues to eat check her sugars on a daily basis  Yesterday was 120  Her last hemoglobin A1c was 7 5  Past Medical History:   Diagnosis Date    Cellulitis     last assessed 05/22/2013    DVT (deep venous thrombosis) ([facility] )     last assessed 07/23/2014    Hyperglycemia     last assessed 05/08/2013    Poorly controlled type 2 diabetes mellitus ([facility] )     last assessed 03/27/2014    Pulmonary embolism, bilateral ([facility] )     resolved 07/23/2014       Past Surgical History:   Procedure Laterality Date    COLECTOMY      partial, last assessed 07/23/2014    HYSTERECTOMY  09/1974    OOPHORECTOMY      TONSILLECTOMY      TOTAL HIP ARTHROPLASTY Bilateral     Once on the left and 3 times on the right side    Chris Flowers    TOTAL KNEE ARTHROPLASTY Right     last assessed 01/13/2016, managed by Lotus Hunter MD       Current Outpatient Medications   Medication Sig Dispense Refill    acetaminophen (TYLENOL) 500 mg tablet Take 1,000 mg by mouth      aspirin 81 MG tablet Take 81 mg by mouth      B-D UF III MINI PEN NEEDLES 31G X 5 MM MISC USE ONCE DAILY WITH LANTUS PEN 90 each 3    B-D UF III MINI PEN NEEDLES 31G X 5 MM MISC USE DAILY WITH INSULIN 100 each 1    benazepril-hydrochlorthiazide (LOTENSIN HCT) 10-12 5 MG per tablet TAKE ONE TABLET BY MOUTH EVERY DAY 90 tablet 3    Biotin 1 MG CAPS Take 1 mg by mouth daily      ergocalciferol (ERGOCALCIFEROL) 1 25 MG (08950 UT) capsule Vitamin D2 1,250 mcg (50,000 unit) capsule   TAKE ONE CAPSULE BY MOUTH THREE TIMES A WEEK      fluticasone (FLONASE) 50 mcg/act nasal spray 1 spray into each nostril      fluticasone-salmeterol (Advair Diskus) 250-50 mcg/dose inhaler Inhale 1 puff 2 (two) times a day 60 blister 2    glucose blood (OneTouch Ultra) test strip Use 1 each 2 (two) times a day Test 1-2 times daily 100 each 3    insulin detemir (Levemir FlexTouch) 100 Units/mL injection pen Inject 22 units under the skin @ bedtime  15 mL 3    Insulin Pen Needle (B-D UF III MINI PEN NEEDLES) 31G X 5 MM MISC by Alternating Nares route 4 (four) times a day 100 each 3    Lancets (onetouch ultrasoft) lancets Use as instructed 100 each 3    Lancets (onetouch ultrasoft) lancets TEST ONCE TO TWICE DAILY 100 each 0    Lancets (onetouch ultrasoft) lancets Test 1-2 times per day  100 each 1    lidocaine (XYLOCAINE) 1 % lidocaine HCl 10 mg/mL (1 %) injection solution   Take by injection route   Multiple Vitamins-Minerals (HAIR SKIN AND NAILS FORMULA PO) Take 1 capsule by mouth daily      OneTouch Ultra test strip TEST ONCE TO TWICE DAILY 100 strip 0    Promethazine-DM (PHENERGAN-DM) 6 25-15 mg/5 mL oral syrup Take 2 tsp at bedtime  120 mL 0    psyllium (METAMUCIL) 58 6 % powder Take 1 packet by mouth daily        simvastatin (ZOCOR) 10 mg tablet TAKE ONE TABLET(S) DAILY AS DIRECTED 90 tablet 3     No current facility-administered medications for this visit  Allergies   Allergen Reactions    Codeine Nausea Only    Doxycycline     Erythromycin      Other reaction(s): GI upset  diarrhea    Levofloxacin      Category: Adverse Reaction;  Annotation - [id number]: Tendon rupture    Penicillins        Review of Systems Constitutional: Negative for chills and fever  See HPI   HENT: Negative for congestion, ear pain, mouth sores, postnasal drip, sinus pressure and trouble swallowing  Eyes: Negative for discharge, redness and itching  Respiratory: Positive for shortness of breath  Negative for apnea, cough, chest tightness, wheezing and stridor  Cardiovascular: Negative for chest pain, palpitations and leg swelling  Gastrointestinal: Negative for abdominal distention, abdominal pain, blood in stool, constipation, diarrhea, nausea and vomiting  Endocrine: Negative for cold intolerance and heat intolerance  Genitourinary: Negative for difficulty urinating, dysuria, flank pain and urgency  Musculoskeletal: Negative for arthralgias and myalgias  Skin: Negative for rash  Neurological: Negative for dizziness, seizures, syncope, speech difficulty, weakness, light-headedness, numbness and headaches  Hematological: Negative for adenopathy  Psychiatric/Behavioral: Negative for agitation, behavioral problems, confusion and sleep disturbance  The patient is not nervous/anxious  Video Exam    Physical Exam unable to perform physical exam due to telephone status  I spent 20 minutes directly with the patient during this visit    VIRTUAL VISIT [address] [place] [address] verbally agrees to participate in [employer]  Pt is aware that [employer] could be limited without vital signs or the ability to perform a full hands-on physical Madi  understands she or the provider may request at any time to terminate the video visit and request the patient to seek care or treatment in person

## 2022-01-14 DIAGNOSIS — Z79.4 UNCONTROLLED TYPE 2 DIABETES MELLITUS WITH HYPERGLYCEMIA, WITH LONG-TERM CURRENT USE OF INSULIN (HCC): Primary | ICD-10-CM

## 2022-01-14 DIAGNOSIS — E11.65 UNCONTROLLED TYPE 2 DIABETES MELLITUS WITH HYPERGLYCEMIA, WITH LONG-TERM CURRENT USE OF INSULIN (HCC): Primary | ICD-10-CM

## 2022-01-17 RX ORDER — PEN NEEDLE, DIABETIC 31 GX5/16"
NEEDLE, DISPOSABLE MISCELLANEOUS
Qty: 100 EACH | Refills: 1 | Status: SHIPPED | OUTPATIENT
Start: 2022-01-17

## 2022-01-19 ENCOUNTER — OFFICE VISIT (OUTPATIENT)
Dept: FAMILY MEDICINE CLINIC | Facility: CLINIC | Age: 87
End: 2022-01-19
Payer: MEDICARE

## 2022-01-19 VITALS
BODY MASS INDEX: 33.53 KG/M2 | WEIGHT: 196.4 LBS | HEART RATE: 80 BPM | RESPIRATION RATE: 16 BRPM | HEIGHT: 64 IN | SYSTOLIC BLOOD PRESSURE: 122 MMHG | DIASTOLIC BLOOD PRESSURE: 70 MMHG

## 2022-01-19 DIAGNOSIS — E11.65 UNCONTROLLED TYPE 2 DIABETES MELLITUS WITH HYPERGLYCEMIA, WITH LONG-TERM CURRENT USE OF INSULIN (HCC): ICD-10-CM

## 2022-01-19 DIAGNOSIS — E78.2 MIXED HYPERLIPIDEMIA: ICD-10-CM

## 2022-01-19 DIAGNOSIS — J45.20 MILD INTERMITTENT ASTHMA WITHOUT COMPLICATION: ICD-10-CM

## 2022-01-19 DIAGNOSIS — Z79.4 UNCONTROLLED TYPE 2 DIABETES MELLITUS WITH HYPERGLYCEMIA, WITH LONG-TERM CURRENT USE OF INSULIN (HCC): ICD-10-CM

## 2022-01-19 DIAGNOSIS — C49.A2 GASTROINTESTINAL STROMAL TUMOR (GIST) OF STOMACH (HCC): ICD-10-CM

## 2022-01-19 DIAGNOSIS — J20.9 ACUTE BRONCHITIS, UNSPECIFIED ORGANISM: Primary | ICD-10-CM

## 2022-01-19 DIAGNOSIS — I10 ESSENTIAL HYPERTENSION: ICD-10-CM

## 2022-01-19 LAB — SL AMB POCT HEMOGLOBIN AIC: 7.6 (ref ?–6.5)

## 2022-01-19 PROCEDURE — 83036 HEMOGLOBIN GLYCOSYLATED A1C: CPT | Performed by: FAMILY MEDICINE

## 2022-01-19 PROCEDURE — 99214 OFFICE O/P EST MOD 30 MIN: CPT | Performed by: FAMILY MEDICINE

## 2022-01-19 NOTE — PROGRESS NOTES
Assessment and Plan:    Patient presents today for follow-up of recent and chronic medical conditions  I spoke to her on the phone last week and prescribed an antibiotic and steroid taper for acute bronchitis and mild intermittent asthma  Today she states she is feeling much better  She has a residual cough and still some congestion, but improved  Her  had a recent stroke and is now back at home  This is a stressor for her, but she does have help and support from family and friends  She has been using her Advair inhaler  1  Acute bronchitis - much improved with antibiotic and steroid taper  Lungs are clear today and she looks well and non-toxic  She should continue her Advair; add nasal saline irrigation for PND  F/U if sx worsen or persist     2  Mild intermittent asthma - stable at present  No active wheezing  Continue Advair  3  Diabetes mellitus - Hgb A1C is stable at 7 6  She is off metform due to diarrhea; only on insulin 22 u at night  Her weight has remained stable  We reviewed food choices and portion control  Continue to monitor  4  Hypertension - BP well controlled; continue benzapril HCT    5  Hyperlipidemia - stable on simvastatin 10mg daily  Labs UTD from 6/21     6  Gist tumor - pt continues with conservative monitoring and observation  She remains asymptomatic at present  F/U 3 months  Subjective:      Patient ID: Mylene Cheadle is a 80 y o  female  CC:    Chief Complaint   Patient presents with    Follow-up     pt here for a follow up- pt states she still is not doing well  RON patel       HPI:    Patient presents today for follow-up of recent and chronic medical conditions  I spoke to her on the phone last week and prescribed an antibiotic and steroid taper for acute bronchitis and mild intermittent asthma  Today she states she is feeling much better  She has a residual cough and still some congestion, but improved   Her  had a recent stroke and is now back at home  This is a stressor for her, but she does have help and support from family and friends  She has been using her Advair inhaler  She was given a cough suppressant at the onset of her symptoms, but that did not help her; she is not taking it presently  The following portions of the patient's history were reviewed and updated as appropriate: allergies, current medications, past family history, past medical history, past social history, past surgical history and problem list       Review of Systems   Constitutional:        See HPI   HENT: Positive for congestion and postnasal drip  Negative for ear pain, mouth sores, sinus pressure and trouble swallowing  Eyes: Negative for discharge, redness and itching  Respiratory: Negative for apnea, cough, chest tightness, shortness of breath, wheezing and stridor  Cough improved, but still mild residual  Denies SOB  Cardiovascular: Negative for chest pain, palpitations and leg swelling  Gastrointestinal: Negative for abdominal distention, abdominal pain, blood in stool, constipation, diarrhea, nausea and vomiting  Endocrine: Negative for cold intolerance and heat intolerance  Genitourinary: Negative for difficulty urinating, dysuria, flank pain and urgency  Musculoskeletal: Negative for arthralgias and myalgias  Skin: Negative for rash  Neurological: Negative for dizziness, seizures, syncope, speech difficulty, weakness, light-headedness, numbness and headaches  Hematological: Negative for adenopathy  Psychiatric/Behavioral: Negative for agitation, behavioral problems, confusion and sleep disturbance  The patient is not nervous/anxious  Family stressor -  had another stroke   Now back from the hospital          Data to review:       Objective:    Vitals:    01/19/22 0949   BP: 122/70   BP Location: Left arm   Patient Position: Sitting   Cuff Size: Large   Pulse: 80   Resp: 16   Weight: 89 1 kg (196 lb 6 4 oz)   Height: 5' 4" (1 626 m)        Physical Exam  Vitals and nursing note reviewed  Constitutional:       General: She is not in acute distress  Appearance: Normal appearance  She is well-developed  She is not ill-appearing, toxic-appearing or diaphoretic  Comments: Well groomed, comfortable, in NAD  Excellent historian  Eyes:      General: No scleral icterus  Conjunctiva/sclera: Conjunctivae normal       Pupils: Pupils are equal, round, and reactive to light  Neck:      Vascular: No carotid bruit  Cardiovascular:      Rate and Rhythm: Normal rate and regular rhythm  Heart sounds: Normal heart sounds  No murmur heard  No friction rub  No gallop  Pulmonary:      Effort: Pulmonary effort is normal  No respiratory distress  Breath sounds: Normal breath sounds  No stridor  No wheezing, rhonchi or rales  Chest:      Chest wall: No tenderness  Musculoskeletal:         General: Normal range of motion  Cervical back: Normal range of motion and neck supple  Right lower leg: No edema  Left lower leg: No edema  Lymphadenopathy:      Cervical: No cervical adenopathy  Skin:     General: Skin is warm and dry  Coloration: Skin is not jaundiced  Neurological:      General: No focal deficit present  Mental Status: She is alert and oriented to person, place, and time  Psychiatric:         Mood and Affect: Mood normal          Behavior: Behavior normal          Thought Content: Thought content normal          Judgment: Judgment normal            BMI Counseling: Body mass index is 33 71 kg/m²  The BMI is above normal  Nutrition recommendations include decreasing portion sizes, encouraging healthy choices of fruits and vegetables and decreasing fast food intake  Exercise recommendations include exercising 3-5 times per week  Rationale for BMI follow-up plan is due to patient being overweight or obese

## 2022-02-17 ENCOUNTER — TELEPHONE (OUTPATIENT)
Dept: FAMILY MEDICINE CLINIC | Facility: CLINIC | Age: 87
End: 2022-02-17

## 2022-02-17 NOTE — TELEPHONE ENCOUNTER
PT CALLED IN REQUESTING AN RX FOR ADVAIR INHALER 1 PUFF TWICE A DAY PER PT AND WOULD LIKE THIS RX SENT TO [facility], PLEASE CALL PATIENT

## 2022-02-22 ENCOUNTER — TELEPHONE (OUTPATIENT)
Dept: FAMILY MEDICINE CLINIC | Facility: CLINIC | Age: 87
End: 2022-02-22

## 2022-02-22 NOTE — TELEPHONE ENCOUNTER
Zoe Curtis came in to drop off parking placard application to be completed by Dr Jhonathan Martinez   In Myriam blue folder

## 2022-03-10 ENCOUNTER — APPOINTMENT (OUTPATIENT)
Dept: LAB | Facility: CLINIC | Age: 87
End: 2022-03-10
Payer: MEDICARE

## 2022-03-10 DIAGNOSIS — Q45.9: ICD-10-CM

## 2022-03-10 LAB
ALBUMIN SERPL BCP-MCNC: 3.3 G/DL (ref 3.5–5)
ALP SERPL-CCNC: 73 U/L (ref 46–116)
ALT SERPL W P-5'-P-CCNC: 20 U/L (ref 12–78)
ANION GAP SERPL CALCULATED.3IONS-SCNC: 3 MMOL/L (ref 4–13)
AST SERPL W P-5'-P-CCNC: 16 U/L (ref 5–45)
BASOPHILS # BLD AUTO: 0.07 THOUSANDS/ΜL (ref 0–0.1)
BASOPHILS NFR BLD AUTO: 1 % (ref 0–1)
BILIRUB SERPL-MCNC: 0.84 MG/DL (ref 0.2–1)
BUN SERPL-MCNC: 17 MG/DL (ref 5–25)
CALCIUM ALBUM COR SERPL-MCNC: 9.9 MG/DL (ref 8.3–10.1)
CALCIUM SERPL-MCNC: 9.3 MG/DL (ref 8.3–10.1)
CHLORIDE SERPL-SCNC: 106 MMOL/L (ref 100–108)
CO2 SERPL-SCNC: 30 MMOL/L (ref 21–32)
CREAT SERPL-MCNC: 0.93 MG/DL (ref 0.6–1.3)
EOSINOPHIL # BLD AUTO: 0.29 THOUSAND/ΜL (ref 0–0.61)
EOSINOPHIL NFR BLD AUTO: 3 % (ref 0–6)
ERYTHROCYTE [DISTWIDTH] IN BLOOD BY AUTOMATED COUNT: 13.6 % (ref 11.6–15.1)
GFR SERPL CREATININE-BSD FRML MDRD: 55 ML/MIN/1.73SQ M
GLUCOSE P FAST SERPL-MCNC: 104 MG/DL (ref 65–99)
HCT VFR BLD AUTO: 43.2 % (ref 34.8–46.1)
HGB BLD-MCNC: 13.2 G/DL (ref 11.5–15.4)
IMM GRANULOCYTES # BLD AUTO: 0.08 THOUSAND/UL (ref 0–0.2)
IMM GRANULOCYTES NFR BLD AUTO: 1 % (ref 0–2)
LYMPHOCYTES # BLD AUTO: 2.34 THOUSANDS/ΜL (ref 0.6–4.47)
LYMPHOCYTES NFR BLD AUTO: 21 % (ref 14–44)
MCH RBC QN AUTO: 28 PG (ref 26.8–34.3)
MCHC RBC AUTO-ENTMCNC: 30.6 G/DL (ref 31.4–37.4)
MCV RBC AUTO: 92 FL (ref 82–98)
MONOCYTES # BLD AUTO: 0.67 THOUSAND/ΜL (ref 0.17–1.22)
MONOCYTES NFR BLD AUTO: 6 % (ref 4–12)
NEUTROPHILS # BLD AUTO: 7.51 THOUSANDS/ΜL (ref 1.85–7.62)
NEUTS SEG NFR BLD AUTO: 68 % (ref 43–75)
NRBC BLD AUTO-RTO: 0 /100 WBCS
PLATELET # BLD AUTO: 253 THOUSANDS/UL (ref 149–390)
PMV BLD AUTO: 10.4 FL (ref 8.9–12.7)
POTASSIUM SERPL-SCNC: 4.3 MMOL/L (ref 3.5–5.3)
PROT SERPL-MCNC: 6.6 G/DL (ref 6.4–8.2)
RBC # BLD AUTO: 4.72 MILLION/UL (ref 3.81–5.12)
SODIUM SERPL-SCNC: 139 MMOL/L (ref 136–145)
WBC # BLD AUTO: 10.96 THOUSAND/UL (ref 4.31–10.16)

## 2022-03-10 PROCEDURE — 80053 COMPREHEN METABOLIC PANEL: CPT

## 2022-03-10 PROCEDURE — 36415 COLL VENOUS BLD VENIPUNCTURE: CPT

## 2022-03-10 PROCEDURE — 85025 COMPLETE CBC W/AUTO DIFF WBC: CPT

## 2022-05-17 DIAGNOSIS — E11.65 UNCONTROLLED TYPE 2 DIABETES MELLITUS WITH HYPERGLYCEMIA (HCC): ICD-10-CM

## 2022-05-17 RX ORDER — LANCETS
EACH MISCELLANEOUS
Qty: 100 EACH | Refills: 1 | Status: SHIPPED | OUTPATIENT
Start: 2022-05-17

## 2022-06-07 DIAGNOSIS — J45.20 MILD INTERMITTENT ASTHMA WITHOUT COMPLICATION: ICD-10-CM

## 2022-06-08 RX ORDER — FLUTICASONE PROPIONATE AND SALMETEROL 50; 250 UG/1; UG/1
POWDER RESPIRATORY (INHALATION)
Qty: 60 BLISTER | Refills: 2 | Status: SHIPPED | OUTPATIENT
Start: 2022-06-08

## 2022-06-13 ENCOUNTER — TELEPHONE (OUTPATIENT)
Dept: FAMILY MEDICINE CLINIC | Facility: CLINIC | Age: 87
End: 2022-06-13

## 2022-06-13 NOTE — TELEPHONE ENCOUNTER
Called patient back and LMOM  Left message stating that if she is experiencing any chest pain or SOB along with her jaw pain, she should go to ER  Requested she call me back, or I will call her back again tomorrow  Pt does need to be seen  I can see her tomorrow at 11am if she calls back    CB

## 2022-06-13 NOTE — TELEPHONE ENCOUNTER
Patient called in and stated she was having really bad jaw pain over the weekend, She stated she was unable to eat and she had to drink out of straw, offered her a ov with another provider but she wanted to talk to Dr Burch first, patient is aware she is not in today

## 2022-06-14 ENCOUNTER — TELEPHONE (OUTPATIENT)
Dept: FAMILY MEDICINE CLINIC | Facility: CLINIC | Age: 87
End: 2022-06-14

## 2022-06-14 ENCOUNTER — OFFICE VISIT (OUTPATIENT)
Dept: FAMILY MEDICINE CLINIC | Facility: CLINIC | Age: 87
End: 2022-06-14
Payer: MEDICARE

## 2022-06-14 VITALS
DIASTOLIC BLOOD PRESSURE: 68 MMHG | BODY MASS INDEX: 33.77 KG/M2 | OXYGEN SATURATION: 98 % | HEART RATE: 80 BPM | HEIGHT: 64 IN | WEIGHT: 197.8 LBS | SYSTOLIC BLOOD PRESSURE: 124 MMHG

## 2022-06-14 DIAGNOSIS — S03.00XA DISLOCATION OF TEMPOROMANDIBULAR JOINT, INITIAL ENCOUNTER: Primary | ICD-10-CM

## 2022-06-14 DIAGNOSIS — F43.0 STRESS REACTION: ICD-10-CM

## 2022-06-14 DIAGNOSIS — E11.65 UNCONTROLLED TYPE 2 DIABETES MELLITUS WITH HYPERGLYCEMIA, WITH LONG-TERM CURRENT USE OF INSULIN (HCC): ICD-10-CM

## 2022-06-14 DIAGNOSIS — R49.0 HOARSENESS OF VOICE: ICD-10-CM

## 2022-06-14 DIAGNOSIS — Z79.4 UNCONTROLLED TYPE 2 DIABETES MELLITUS WITH HYPERGLYCEMIA, WITH LONG-TERM CURRENT USE OF INSULIN (HCC): ICD-10-CM

## 2022-06-14 LAB — SL AMB POCT HEMOGLOBIN AIC: 7.6 (ref ?–6.5)

## 2022-06-14 PROCEDURE — 83036 HEMOGLOBIN GLYCOSYLATED A1C: CPT | Performed by: FAMILY MEDICINE

## 2022-06-14 PROCEDURE — 99215 OFFICE O/P EST HI 40 MIN: CPT | Performed by: FAMILY MEDICINE

## 2022-06-14 NOTE — TELEPHONE ENCOUNTER
Called patient and left message on machine  Asking how her jaw was feeling  Also left message stating I would be happy to see her today at 11:00 a m  Requested she please call back for an update

## 2022-06-14 NOTE — PROGRESS NOTES
Assessment and Plan:    1  Severe jaw pain - musculoskeletal in nature  This has happened to patient 25 years ago when she was going through a stressful time  It sounds like her jaw was in severe spasm or even dislocated  The symptoms are improving and she can now open her mouth to increased range  I would like her to see a specialist to prevent further recurrences  Referred to [facility]  2  Dislocation of left jaw - see above  Referral to [address] [place] [address]  3  Hoarseness of voice - patient uses Advair and was encouraged to rinse her mouth after each treatment  TMs are normal bilaterally  She does have a postnasal drip which is likely contributing to her hoarseness  Continue with fluticasone and saline nasal irrigation  ENT referral for evaluation if symptoms persist     4  Stress reaction - son passed away in March  She is doing ok but also overwhelmed with handling of his finances, estate, and granddaughter's family  She would like something to help her sleep at night -  We discussed the pros and cons of sleep aids  At this time I feel the risks outweigh the benefits  She will continue to monitor and notify me if symptoms worsen or persist     5  Diabetes mellitus hemoglobin A1c today is stable at 7 6  Patient remains on Levemir    45 minute spent with patient today with greater than 50% of time spent on counseling and care coordination of the above  F/u 3 months  Subjective:      Patient ID: Carolina Recinos is a 80 y o  female  CC:    Chief Complaint   Patient presents with    Jaw Pain     Pt is here for pain in her jaw  Pt states the jaw was locked on the left side starting 3 days ago  Her jaw was unable to open and she was only able to drink from a straw  She states in her ear she was hearing crackling in that left ear like things were breaking when she was trying to open  As time went by she states her mouth can open most of the way   kw    Anxiety     Pt states she has more anxiety recently since her son's death  HPI:    Pt notes increase in stress lately - multiple family stressors, death of son in March, take care of   About a week ago she noted the onset of severe jaw pain which she could not open her mouth fully  Extremely tender on the left side she notes that this had occurred about 25 years ago when she was going through a divorce  In the last few days, the symptoms have improved and she is now able to open her mouth to increased ROM, but still not completely  Candie Hiss - daughter    Ella Rodriguez but not every day   Hoarseness for several months   Has used saline nasal irrigation      The following portions of the patient's history were reviewed and updated as appropriate: allergies, current medications, past family history, past medical history, past social history, past surgical history and problem list       Review of Systems   Constitutional:        See HPI   HENT: Negative for congestion, ear pain, mouth sores, sinus pressure and trouble swallowing  See HPI   Eyes: Negative for discharge, redness and itching  Respiratory: Negative for apnea, cough, chest tightness, shortness of breath, wheezing and stridor  Cardiovascular: Negative for chest pain, palpitations and leg swelling  Gastrointestinal: Negative for abdominal distention, abdominal pain, blood in stool, constipation, diarrhea, nausea and vomiting  Endocrine: Negative for cold intolerance and heat intolerance  Genitourinary: Negative for difficulty urinating, dysuria, flank pain and urgency  Musculoskeletal: Negative for arthralgias and myalgias  HPI   Skin: Negative for rash  Neurological: Negative for dizziness, seizures, syncope, speech difficulty, weakness, light-headedness, numbness and headaches  Hematological: Negative for adenopathy  Psychiatric/Behavioral: Negative for agitation, behavioral problems, confusion and sleep disturbance   The patient is not nervous/anxious  Data to review:       Objective:    Vitals:    06/14/22 1106   BP: 124/68   BP Location: Right arm   Patient Position: Sitting   Pulse: 80   SpO2: 98%   Weight: 89 7 kg (197 lb 12 8 oz)   Height: 5' 4" (1 626 m)        Physical Exam  Vitals and nursing note reviewed  Constitutional:       General: She is not in acute distress  Appearance: She is well-developed  She is not ill-appearing, toxic-appearing or diaphoretic  Comments: Patient is alert and oriented, well groomed, in no apparent distress  She is an excellent historian  HENT:      Right Ear: Tympanic membrane and external ear normal  There is no impacted cerumen  Left Ear: Tympanic membrane and external ear normal  There is no impacted cerumen  Mouth/Throat:      Comments: Patient cannot open her mouth fully  She is extremely tender over her left TMJ  Eyes:      General: No scleral icterus  Conjunctiva/sclera: Conjunctivae normal       Pupils: Pupils are equal, round, and reactive to light  Neck:      Vascular: No carotid bruit  Cardiovascular:      Rate and Rhythm: Normal rate and regular rhythm  Heart sounds: Normal heart sounds  No murmur heard  No friction rub  No gallop  Pulmonary:      Effort: Pulmonary effort is normal  No respiratory distress  Breath sounds: Normal breath sounds  No wheezing or rales  Musculoskeletal:         General: Normal range of motion  Cervical back: Normal range of motion and neck supple  Right lower leg: No edema  Left lower leg: No edema  Comments: Extremely tender over left TMJ to palpation  Lymphadenopathy:      Cervical: No cervical adenopathy  Skin:     General: Skin is warm and dry  Coloration: Skin is not jaundiced  Neurological:      Mental Status: She is alert and oriented to person, place, and time     Psychiatric:         Mood and Affect: Mood normal          Behavior: Behavior normal          Thought Content:  Thought content normal          Judgment: Judgment normal

## 2022-06-14 NOTE — TELEPHONE ENCOUNTER
PATIENT CALLED SEEKING APPOINTMENT  INFORMED PATIENT (PER CO-WORKER WHO HAD CONVERSATION WITH DR OVALLE) THAT SHE IS NOT GUARANTEED TO HAVE EKG TODAY  PATIENT STATES SHE WANTS TO BE SEEN AND AFTER SHE SEES HER SHE "MAY DECIDE TO THAT SHE NEEDS AN EKG"

## 2022-06-16 ENCOUNTER — TELEPHONE (OUTPATIENT)
Dept: FAMILY MEDICINE CLINIC | Facility: CLINIC | Age: 87
End: 2022-06-16

## 2022-06-16 NOTE — TELEPHONE ENCOUNTER
Raffaele placed to patient's daughter, Jeannette Connelly, patient's daughter : [phone]  Pt had asked me to call her to review her recent appt  I left MOM stating that her mom is doing well - her jaw pain is from her TMJ and I have asked her to see a specialist   She may call me back if she has any further questions

## 2022-07-13 ENCOUNTER — TELEPHONE (OUTPATIENT)
Dept: FAMILY MEDICINE CLINIC | Facility: CLINIC | Age: 87
End: 2022-07-13

## 2022-07-13 NOTE — TELEPHONE ENCOUNTER
Patient called in and stated she saw Dr Cale Lopez last month and she was told that if she needed something for her anxiety Dr Cale Lopez would sent something to the pharmacy for her   Tired to schedule ov with patient but she wanted me to fwd the message to provider first

## 2022-07-14 ENCOUNTER — TELEPHONE (OUTPATIENT)
Dept: FAMILY MEDICINE CLINIC | Facility: CLINIC | Age: 87
End: 2022-07-14

## 2022-07-14 DIAGNOSIS — F43.0 STRESS REACTION: Primary | ICD-10-CM

## 2022-07-14 RX ORDER — SERTRALINE HYDROCHLORIDE 25 MG/1
25 TABLET, FILM COATED ORAL DAILY
Qty: 30 TABLET | Refills: 5 | Status: SHIPPED | OUTPATIENT
Start: 2022-07-14

## 2022-07-14 NOTE — TELEPHONE ENCOUNTER
Please notify patient that I have called in Zoloft 25 mg  She should take this on a daily basis ,it is not a p r n  medication  Can you schedule her to see me on Tuesday August 2nd?   Thank you,  CB

## 2022-07-14 NOTE — TELEPHONE ENCOUNTER
PT CALLED AND ASKED IF DR MCARTHUR ADDRESSED THIS AND I LET HER KNOW YES, SHE DID  I SCHEDULED PT FOR 8/2 WITH DR MCARTHUR AS WELL  THANK YOU

## 2022-07-14 NOTE — PROGRESS NOTES
Patient called in stating she is feeling stressed and anxious, as we discussed at her prior appointment  At that time, she declined any further medications  However, now she is requesting something  Will start low-dose Zoloft  Will follow up with her on August 2nd for office visit

## 2022-07-14 NOTE — TELEPHONE ENCOUNTER
Pt called stating CB put her on Zoloft and she was reading the insert and it states that a person that has any kind of blood disorder they do not recommend that they take this, I told pt to call her Oncologist to ask them if it is ok for her to take

## 2022-07-20 ENCOUNTER — TELEPHONE (OUTPATIENT)
Dept: FAMILY MEDICINE CLINIC | Facility: CLINIC | Age: 87
End: 2022-07-20

## 2022-07-20 DIAGNOSIS — E11.65 UNCONTROLLED TYPE 2 DIABETES MELLITUS WITH HYPERGLYCEMIA (HCC): ICD-10-CM

## 2022-07-20 RX ORDER — INSULIN DETEMIR 100 [IU]/ML
INJECTION, SOLUTION SUBCUTANEOUS
Qty: 15 ML | Refills: 3 | Status: SHIPPED | OUTPATIENT
Start: 2022-07-20

## 2022-07-20 RX ORDER — PEN NEEDLE, DIABETIC 31 GX5/16"
NEEDLE, DISPOSABLE MISCELLANEOUS 4 TIMES DAILY
Qty: 100 EACH | Refills: 3 | Status: SHIPPED | OUTPATIENT
Start: 2022-07-20

## 2022-07-20 NOTE — TELEPHONE ENCOUNTER
PT IS ON HER LAST PEN - LEVEMIR &  PEN NEEDLES 31 GAUGE  [facility] ON [address] [facility]  THANK YOU

## 2022-07-26 ENCOUNTER — RA CDI HCC (OUTPATIENT)
Dept: OTHER | Facility: HOSPITAL | Age: 87
End: 2022-07-26

## 2022-07-26 NOTE — PROGRESS NOTES
[address]  coding opportunities       Chart reviewed, no opportunity found: CHART REVIEWED, NO OPPORTUNITY FOUND        Patients Insurance     Medicare Insurance: Medicare

## 2022-08-02 ENCOUNTER — OFFICE VISIT (OUTPATIENT)
Dept: FAMILY MEDICINE CLINIC | Facility: CLINIC | Age: 87
End: 2022-08-02
Payer: MEDICARE

## 2022-08-02 VITALS
BODY MASS INDEX: 32.95 KG/M2 | DIASTOLIC BLOOD PRESSURE: 62 MMHG | WEIGHT: 193 LBS | HEIGHT: 64 IN | HEART RATE: 72 BPM | SYSTOLIC BLOOD PRESSURE: 130 MMHG

## 2022-08-02 DIAGNOSIS — F43.0 STRESS REACTION: Primary | ICD-10-CM

## 2022-08-02 DIAGNOSIS — R05.9 COUGH: ICD-10-CM

## 2022-08-02 DIAGNOSIS — L65.9 HAIR LOSS: ICD-10-CM

## 2022-08-02 DIAGNOSIS — Z79.4 TYPE 2 DIABETES MELLITUS WITHOUT COMPLICATION, WITH LONG-TERM CURRENT USE OF INSULIN (HCC): ICD-10-CM

## 2022-08-02 DIAGNOSIS — E11.9 TYPE 2 DIABETES MELLITUS WITHOUT COMPLICATION, WITH LONG-TERM CURRENT USE OF INSULIN (HCC): ICD-10-CM

## 2022-08-02 DIAGNOSIS — I10 ESSENTIAL HYPERTENSION: ICD-10-CM

## 2022-08-02 DIAGNOSIS — E55.9 VITAMIN D DEFICIENCY: ICD-10-CM

## 2022-08-02 PROCEDURE — 99214 OFFICE O/P EST MOD 30 MIN: CPT | Performed by: FAMILY MEDICINE

## 2022-08-02 NOTE — PROGRESS NOTES
Assessment and Plan:    Pleasant 51-year-old female presents today with her daughter Susan Santiago to review her mood and other medical conditions from last visit  Sertraline 25 mg was added as patient was anxious and tearful after the recent death of her son  She has noted some hair loss as well  She has also noted some low blood sugar readings in the mornings  When she dropped to the 80s, she does feel somewhat shaky  She also complains of postnasal drip and occasional hoarseness  She is currently using a Neti pot  1  Stress reaction - mood is much improved with the addition of sertraline 25 mg daily  She is no longer tearful  She feels calmer  Continue current dosage  2  Diabetes mellitus - patient has noted some mild hypoglycemia in the morning  Decrease daily Levemir from 22-20 units daily  Continue to monitor and notify me if symptoms worsen or persist   She does note that she does not eat as much as she used to     3  Hypertension - blood pressure stable  Continue benazepril HCT    4  Cough, associated with postnasal drip - patient to restart her fluticasone nasal spray  Notify me if symptoms worsen or persist   I do not believe that her symptoms at present are related to her current usage of benazepril  5  Hairloss - mild; secondary to stress of death of son  Pt reassured  F/U 2 months  Subjective:      Patient ID: Aurelia Garcia is a 80 y o  female  CC:    Chief Complaint   Patient presents with    Follow-up     Patient is here for a 6 month check up  patient c/o hair loss, rattling from her Advair  Patient jittery from low blood sugar at 86 today  ak       HPI:    HPI          Review of Systems   Constitutional:        See HPI   HENT: Positive for postnasal drip  Negative for congestion, ear pain, mouth sores, sinus pressure and trouble swallowing  See HPI   Eyes: Negative for discharge, redness and itching     Respiratory: Negative for apnea, cough, chest tightness, shortness of breath, wheezing and stridor  Cardiovascular: Negative for chest pain, palpitations and leg swelling  Gastrointestinal: Negative for abdominal distention, abdominal pain, blood in stool, constipation, diarrhea, nausea and vomiting  Endocrine: Negative for cold intolerance and heat intolerance  Genitourinary: Negative for difficulty urinating, dysuria, flank pain and urgency  Musculoskeletal: Negative for arthralgias and myalgias  Skin: Negative for rash  Hair loss see HPI   Neurological: Negative for dizziness, seizures, syncope, speech difficulty, weakness, light-headedness, numbness and headaches  Hematological: Negative for adenopathy  Psychiatric/Behavioral: Negative for agitation, behavioral problems, confusion and sleep disturbance  The patient is not nervous/anxious  See HPI         Data to review:       Objective:    Vitals:    08/02/22 1204   BP: 130/62   Pulse: 72   Weight: 87 5 kg (193 lb)   Height: 5' 4" (1 626 m)        Physical Exam  Vitals and nursing note reviewed  Constitutional:       General: She is not in acute distress  Appearance: She is well-developed  She is not ill-appearing, toxic-appearing or diaphoretic  Comments: Appears well  Well groomed  In no apparent distress  She is an excellent historian  HENT:      Right Ear: Tympanic membrane, ear canal and external ear normal       Left Ear: Tympanic membrane, ear canal and external ear normal       Mouth/Throat:      Comments: White mucus noted in posterior oropharynx  Eyes:      General: No scleral icterus  Conjunctiva/sclera: Conjunctivae normal       Pupils: Pupils are equal, round, and reactive to light  Neck:      Vascular: No carotid bruit  Cardiovascular:      Rate and Rhythm: Normal rate and regular rhythm  Heart sounds: Normal heart sounds  No murmur heard  No friction rub  No gallop     Pulmonary:      Effort: Pulmonary effort is normal  No respiratory distress  Breath sounds: Normal breath sounds  No wheezing or rales  Musculoskeletal:         General: Normal range of motion  Cervical back: Normal range of motion and neck supple  Right lower leg: No edema  Left lower leg: No edema  Lymphadenopathy:      Cervical: No cervical adenopathy  Skin:     General: Skin is warm and dry  Coloration: Skin is not jaundiced  Neurological:      Mental Status: She is alert and oriented to person, place, and time  Psychiatric:         Mood and Affect: Mood normal          Behavior: Behavior normal          Thought Content:  Thought content normal          Judgment: Judgment normal

## 2022-08-03 ENCOUNTER — TELEPHONE (OUTPATIENT)
Dept: ADMINISTRATIVE | Facility: OTHER | Age: 87
End: 2022-08-03

## 2022-08-03 NOTE — TELEPHONE ENCOUNTER
----- Message from Jus Carrier sent at 8/2/2022 12:10 PM EDT -----  Regarding: eye exam  08/02/22 12:10 PM    Hello, our patient Mateo Romero has had Diabetic Eye Examcompleted/performed  Please assist in updating the patient chart by making an External outreach to Dr Alissa Tsai facility located in [facility]  The date of service is 2021/2022      Thank you,  Haley Ramos  [facility]

## 2022-08-03 NOTE — LETTER
Diabetic Eye Exam Form    Date Requested: 22  Patient: Peña Allen  Patient : [id number]   Referring Provider: Domo Grewal MD    DIABETIC Eye Exam Date _______________________________    Type of Exam MUST be documented for Diabetic Eye Exams  Please CHECK ONE  Retinal Exam       Dilated Retinal Exam       OCT       Optomap-Iris Exam      Fundus Photography     Left Eye - Please check Retinopathy AND Type or No Retinopathy      Exam did show retinopathy    Exam did not show retinopathy         Mild     Proliferative           Moderate    Severe            None         Right Eye - Please check Retinopathy AND Type or No Retinopathy     Exam did show retinopathy    Exam did not show retinopathy         Mild     Proliferative        Moderate    Severe        None       Comments DOS:     Practice Providing Exam ______________________________________________    Exam Performed By (print name) _______________________________________      Provider Signature ___________________________________________________    These reports are needed for  compliance  Please fax this completed form and a copy of the Diabetic Eye Exam report to our office located at [place] [address] as soon as possible via [phone] attention Corinne: Phone [phone]  We thank you for your assistance in treating our mutual patient

## 2022-08-04 NOTE — TELEPHONE ENCOUNTER
Upon review of the In Basket request and the patient's chart, initial outreach has been made via fax, please see Contacts section for details       Thank you  Joy Dove

## 2022-08-09 NOTE — TELEPHONE ENCOUNTER
Upon review of the In Basket request we were able to locate, review, and update the patient chart as requested for Diabetic Eye Exam     Any additional questions or concerns should be emailed to the Practice Liaisons via [email or web address] email, please do not reply via In Basket      Thank you  Pilar Silvestre

## 2022-08-28 DIAGNOSIS — E78.00 PURE HYPERCHOLESTEROLEMIA: ICD-10-CM

## 2022-08-28 DIAGNOSIS — I10 ESSENTIAL HYPERTENSION: ICD-10-CM

## 2022-08-29 RX ORDER — SIMVASTATIN 10 MG
TABLET ORAL
Qty: 90 TABLET | Refills: 3 | Status: SHIPPED | OUTPATIENT
Start: 2022-08-29

## 2022-09-19 ENCOUNTER — TELEPHONE (OUTPATIENT)
Dept: FAMILY MEDICINE CLINIC | Facility: CLINIC | Age: 87
End: 2022-09-19

## 2022-09-19 NOTE — TELEPHONE ENCOUNTER
Medication: Levemir Flex Touch - should be 3 refills total    Day supply:   Pharmacy: [facility], [address]  Last office visit: 8/2/2022  Upcoming office visit: 9/28/2022    Pt states she went to the pharmacy to get this refilled but the pharmacy needs to get permission from us? Pt states that she will be out of insulin tomorrow  She is requesting to speak to [facility] specifically but I did let her know that she is not in the office today and I was going to send this message to the clinical staff  Can pt please have a refill of this?

## 2022-09-20 DIAGNOSIS — E11.65 UNCONTROLLED TYPE 2 DIABETES MELLITUS WITH HYPERGLYCEMIA (HCC): ICD-10-CM

## 2022-09-20 RX ORDER — LANCETS
EACH MISCELLANEOUS
Qty: 100 EACH | Refills: 1 | Status: SHIPPED | OUTPATIENT
Start: 2022-09-20

## 2022-09-20 RX ORDER — BLOOD SUGAR DIAGNOSTIC
1 STRIP MISCELLANEOUS 2 TIMES DAILY
Qty: 100 EACH | Refills: 3 | Status: SHIPPED | OUTPATIENT
Start: 2022-09-20

## 2022-09-26 PROBLEM — F43.0 STRESS REACTION: Status: ACTIVE | Noted: 2022-09-26

## 2022-09-27 ENCOUNTER — TELEPHONE (OUTPATIENT)
Dept: FAMILY MEDICINE CLINIC | Facility: CLINIC | Age: 87
End: 2022-09-27

## 2022-09-27 NOTE — TELEPHONE ENCOUNTER
Patient daughter called in because she has a few concerns about the patient and knows she has an upcoming visit with CB  Daughter stated that the patient recently took a fall about 2 weeks ago  Daughter stated and since then the patient has been distracted, having a hard time getting things done, or trouble concentrating  Daughter stated that the patient states she doesn't know a lot  Daughter didn't specify where she hit on the body when she fell  Daughter also stated that the patient did not go to the hospital , inform dr Sanam Rosa or any emergency response, the patient simply notified neighbor who has some medical background and checked her out at home  Please advise   Thank you

## 2022-09-28 ENCOUNTER — OFFICE VISIT (OUTPATIENT)
Dept: FAMILY MEDICINE CLINIC | Facility: CLINIC | Age: 87
End: 2022-09-28
Payer: MEDICARE

## 2022-09-28 VITALS
SYSTOLIC BLOOD PRESSURE: 118 MMHG | BODY MASS INDEX: 32.95 KG/M2 | DIASTOLIC BLOOD PRESSURE: 72 MMHG | WEIGHT: 193 LBS | HEART RATE: 80 BPM | HEIGHT: 64 IN | RESPIRATION RATE: 16 BRPM

## 2022-09-28 DIAGNOSIS — C49.A2 GASTROINTESTINAL STROMAL TUMOR (GIST) OF STOMACH (HCC): ICD-10-CM

## 2022-09-28 DIAGNOSIS — J45.20 MILD INTERMITTENT ASTHMA WITHOUT COMPLICATION: ICD-10-CM

## 2022-09-28 DIAGNOSIS — R05.9 COUGH: ICD-10-CM

## 2022-09-28 DIAGNOSIS — Z00.00 MEDICARE ANNUAL WELLNESS VISIT, SUBSEQUENT: ICD-10-CM

## 2022-09-28 DIAGNOSIS — E11.65 UNCONTROLLED TYPE 2 DIABETES MELLITUS WITH HYPERGLYCEMIA, WITH LONG-TERM CURRENT USE OF INSULIN (HCC): ICD-10-CM
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
## 2022-09-28 DIAGNOSIS — I10 ESSENTIAL HYPERTENSION: ICD-10-CM

## 2022-09-28 DIAGNOSIS — Z79.4 UNCONTROLLED TYPE 2 DIABETES MELLITUS WITH HYPERGLYCEMIA, WITH LONG-TERM CURRENT USE OF INSULIN (HCC): ICD-10-CM

## 2022-09-28 DIAGNOSIS — E27.8 LEFT ADRENAL MASS (HCC): ICD-10-CM

## 2022-09-28 DIAGNOSIS — R26.89 BALANCE DISORDER: ICD-10-CM

## 2022-09-28 DIAGNOSIS — R41.3 MEMORY DISTURBANCE: ICD-10-CM

## 2022-09-28 DIAGNOSIS — F43.0 STRESS REACTION: ICD-10-CM

## 2022-09-28 DIAGNOSIS — R26.2 AMBULATORY DYSFUNCTION: ICD-10-CM

## 2022-09-28 PROBLEM — N18.31 STAGE 3A CHRONIC KIDNEY DISEASE (HCC): Status: ACTIVE | Noted: 2022-09-28

## 2022-09-28 PROBLEM — N18.31 STAGE 3A CHRONIC KIDNEY DISEASE (HCC): Status: RESOLVED | Noted: 2022-09-28 | Resolved: 2022-09-28

## 2022-09-28 LAB — SL AMB POCT HEMOGLOBIN AIC: 6.7 (ref ?–6.5)

## 2022-09-28 PROCEDURE — 99215 OFFICE O/P EST HI 40 MIN: CPT | Performed by: FAMILY MEDICINE

## 2022-09-28 PROCEDURE — G0439 PPPS, SUBSEQ VISIT: HCPCS | Performed by: FAMILY MEDICINE

## 2022-09-28 PROCEDURE — 83036 HEMOGLOBIN GLYCOSYLATED A1C: CPT | Performed by: FAMILY MEDICINE

## 2022-09-28 NOTE — PROGRESS NOTES
Assessment and Plan:     Patient was in her usual state of good health, in fact was feeling very good on the morning of Labor Day weekend  She was rushing to go out and looking forward to having breakfast out  She turned quickly, using her cane for stability and in doing so hit her left knee on the corner of a cabinet and had excruciating pain  She fell to the ground on her bottom, she did not hit her head  A neighbor helped her get back up on her feet  Since then, she used Tylenol for 1-2 days and has been icing her left knee and elevating it  She has not had it x-rayed or examined since the incident, but feels that pain is much improved and she has normal ROM  She is now using a walker for added stability instead of her cane and feels a little anxious about ambulating  She sustained a large ecchymosis mostly on the lateral aspect of the left knee which is starting to fade  1  Fall - pt was rushing and hit her leg  She did not have near syncope, or hit her head  She denies vertigo or feeling like she was going to faint  She hit her R knee which is heeling well  2  Balance issues - refer for PT to improve strength and balance  3  R knee trauma - doing well; healing nicely  Resultant ecchymosis is resolving  Good ROM  4  Diabetes - A1c today has improved to 6 7  Last was 7 6  She remains on Levemir    5  Gist tumor - stable at present; f/u with GI and Onc     6  Hypertension - stable and within normal limits  Continue benazepril HCT    7  Stress reaction due to death of her son - is doing much better with addition of sertraline; there is a degree of caregiver burden, given the age and medical conditions of her   8  Cough - resolved from last visit      9  Ambulatory dysfunction - continues to use a walker for added stability, for now  Refer to PT     10  Asthma - stable on Advair    11  Left adrenal mass - check labs    12  A [place] - metrics reviewed  Advanced directives completed  Labs ordered  Immunizations reviewed  F/U in 6 weeks  45 minute spent with patient today with greater than 50% of time spent on counseling and care coordination of the above  She states I may discuss today's visit with her daughter Dimitri Carbone, if she calls with questions or concerns  Problem List Items Addressed This Visit     Essential hypertension    Uncontrolled type 2 diabetes mellitus with hyperglycemia, with long-term current use of insulin ([facility] ) - Primary    Relevant Orders    POCT hemoglobin A1c    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)    Stress reaction      Other Visit Diagnoses     Cough              Depression Screening and Follow-up Plan: Patient was screened for depression during today's encounter  They screened negative with a PHQ-2 score of 0  Falls Plan of Care: referral to physical therapy  Preventive health issues were discussed with patient, and age appropriate screening tests were ordered as noted in patient's After Visit Summary  Personalized health advice and appropriate referrals for health education or preventive services given if needed, as noted in patient's After Visit Summary  History of Present Illness:     Patient presents for a Medicare Wellness Visit    Patient was in her usual state of good health, in fact was feeling very good on the morning of Labor Day weekend  She was rushing to go out and looking forward to having breakfast out  She turned quickly, using her cane for stability and in doing so hit her left knee on the corner of a cabinet and had excruciating pain  She fell to the ground on her bottom, she did not hit her head  A neighbor helped her get back up on her feet  Since then, she used Tylenol for 1-2 days and has been icing her left knee and elevating it  She has not had it x-rayed or examined since the incident, but feels that pain is much improved and she has normal ROM    She is now using a walker for added stability instead of her cane and feels a little anxious about ambulating  She sustained a large ecchymosis mostly on the lateral aspect of the left knee which is starting to fade  Mood is very good - sertraline helped a lot  She feels more anxious now and is afraid to go back to cane     States that Daughter is worried about her because she is not taking care of herself she is too busy taking care of her   Daughter Yenni Hinson is concerned that home is too much to handle for her and her  alone  Her  is driving but has slowed down  He is 80years old  Pt does note that she sometimes has difficulties in memory especially around the death iof her son  She does admit that the three weeks after his death were like a blur to her  Now she feels that her memory may not be what it was, but she feels confident living as she does and with her current mental status  CBC and CMP from March are within normal limits  B12 from 1 year ago was low normal 337 with normal methylmalonic acid  Patient Care Team:  Annika Mike MD as PCP - General (Family Medicine)     Review of Systems:     Review of Systems   Constitutional:        See HPI   HENT: Negative for congestion, ear pain, mouth sores, sinus pressure and trouble swallowing  Eyes: Negative for discharge, redness and itching  Respiratory: Negative for apnea, cough, chest tightness, shortness of breath, wheezing and stridor  Cardiovascular: Negative for chest pain, palpitations and leg swelling  Gastrointestinal: Negative for abdominal distention, abdominal pain, blood in stool, constipation, diarrhea, nausea and vomiting  Endocrine: Negative for cold intolerance and heat intolerance  Genitourinary: Negative for difficulty urinating, dysuria, flank pain and urgency  Musculoskeletal: Negative for arthralgias and myalgias  See HPI   Skin: Negative for rash     Neurological: Negative for dizziness, seizures, syncope, speech difficulty, weakness, light-headedness, numbness and headaches  Hematological: Negative for adenopathy  Psychiatric/Behavioral: Negative for agitation, behavioral problems, confusion and sleep disturbance  The patient is not nervous/anxious  Problem List:     Patient Active Problem List   Diagnosis    Essential hypertension    Mixed hyperlipidemia    Uncontrolled type 2 diabetes mellitus with hyperglycemia, with long-term current use of insulin ([facility] )    Asthma    Vitamin D deficiency    Osteoarthritis    Obesity (BMI 30-39  9)    OAB (overactive bladder)    Hx of pulmonary embolus    Carotid artery stenosis    Ambulatory dysfunction    [facility]    Left hip pain    Personal history of malignant neoplasm of uterus    Left adrenal mass ([facility]    Stress reaction      Past Medical and Surgical History:     Past Medical History:   Diagnosis Date    Cellulitis     last assessed 05/22/2013    DVT (deep venous thrombosis) ([facility] )     last assessed 07/23/2014    Hyperglycemia     last assessed 05/08/2013    Poorly controlled type 2 diabetes mellitus ([facility] )     last assessed 03/27/2014    Pulmonary embolism, bilateral ([facility] )     resolved 07/23/2014    Stress reaction 9/26/2022     Past Surgical History:   Procedure Laterality Date    COLECTOMY      partial, last assessed 07/23/2014    HYSTERECTOMY  09/1974    OOPHORECTOMY      TONSILLECTOMY      TOTAL HIP ARTHROPLASTY Bilateral     Once on the left and 3 times on the right side  Sandra Fernandez    TOTAL KNEE ARTHROPLASTY Right     last assessed 01/13/2016, managed by Kamaljit Plummer MD      Family History:     Family History   Problem Relation Age of Onset    Bone cancer Mother     Lung cancer Father     Multiple sclerosis Sister     COPD Brother     Breast cancer Maternal Grandmother     Mental illness Family       Social History:     Social History     Socioeconomic History    Marital status:       Spouse name: None    Number of children: None    Years of education: None    Highest education level: None   Occupational History    None   Tobacco Use    Smoking status: Never Smoker    Smokeless tobacco: Never Used    Tobacco comment: No secondhand smoke exposure   Substance and Sexual Activity    Alcohol use: Yes     Comment: socially    Drug use: No    Sexual activity: None   Other Topics Concern    None   Social History Narrative    Lives with significant other     Social Determinants of Health     Financial Resource Strain: Not on file   Food Insecurity: Not on file   Transportation Needs: Not on file   Physical Activity: Not on file   Stress: Not on file   Social Connections: Not on file   Intimate Partner Violence: Not on file   Housing Stability: Not on file      Medications and Allergies:     Current Outpatient Medications   Medication Sig Dispense Refill    acetaminophen (TYLENOL) 500 mg tablet Take 1,000 mg by mouth      Advair Diskus 250-50 MCG/ACT inhaler INHALE ONE PUFF BY MOUTH TWICE A DAY 60 blister 2    aspirin 81 MG tablet Take 81 mg by mouth      B-D UF III MINI PEN NEEDLES 31G X 5 MM MISC USE DAILY WITH INSULIN 100 each 1    benazepril-hydrochlorthiazide (LOTENSIN HCT) 10-12 5 MG per tablet TAKE ONE TABLET BY MOUTH EVERY DAY 90 tablet 3    Biotin 1 MG CAPS Take 1 mg by mouth daily      ergocalciferol (ERGOCALCIFEROL) 1 25 MG (35140 UT) capsule Vitamin D2 1,250 mcg (50,000 unit) capsule   TAKE ONE CAPSULE BY MOUTH THREE TIMES A WEEK      glucose blood (OneTouch Ultra) test strip Use 1 each 2 (two) times a day Test 1-2 times daily 100 each 3    insulin detemir (Levemir FlexTouch) 100 Units/mL injection pen Inject 22 units under the skin @ bedtime   15 mL 3    Insulin Pen Needle (B-D UF III MINI PEN NEEDLES) 31G X 5 MM MISC by Alternating Nares route 4 (four) times a day 100 each 3    Lancets (onetouch ultrasoft) lancets TEST 1 TO 2 TIMES PER  each 1    lidocaine (XYLOCAINE) 1 % lidocaine HCl 10 mg/mL (1 %) injection solution   Take by injection route   Multiple Vitamins-Minerals (HAIR SKIN AND NAILS FORMULA PO) Take 1 capsule by mouth daily      psyllium (METAMUCIL) 58 6 % powder Take 1 packet by mouth daily        sertraline (Zoloft) 25 mg tablet Take 1 tablet (25 mg total) by mouth daily 30 tablet 5    simvastatin (ZOCOR) 10 mg tablet TAKE ONE TABLET BY MOUTH EVERY DAY AS DIRECTED 90 tablet 3     No current facility-administered medications for this visit  Allergies   Allergen Reactions    Metformin Diarrhea    Codeine Nausea Only    Doxycycline     Erythromycin      Other reaction(s): GI upset  diarrhea    Levofloxacin      Category: Adverse Reaction; Annotation - [id number]: Tendon rupture    Penicillins       Immunizations:     Immunization History   Administered Date(s) Administered    COVID-19 MODERNA VACC 0 5 ML IM 01/22/2021, 02/17/2021, 11/03/2021    INFLUENZA 10/07/2015, 10/19/2018    Influenza Quadrivalent Preservative Free 3 years and older IM 10/17/2014    Influenza Split High Dose Preservative Free IM 10/07/2015, 11/08/2016, 09/15/2017    Influenza, high dose seasonal 0 7 mL 10/22/2020, 09/21/2021    Influenza, injectable, quadrivalent, preservative free 0 5 mL 10/03/2019    Influenza, seasonal, injectable 10/08/2009, 10/19/2010, 09/22/2011, 10/09/2012    Pneumococcal Polysaccharide PPV23 08/17/2015    Zoster 06/01/2012    influenza, trivalent, adjuvanted 10/19/2018      Health Maintenance: There are no preventive care reminders to display for this patient  Topic Date Due    Pneumococcal Vaccine: 65+ Years (2 - PCV) 08/17/2016    COVID-19 Vaccine (4 - Booster for Moderna series) 03/03/2022    Influenza Vaccine (1) 09/01/2022      Medicare Screening Tests and Risk Assessments:     Emperatriz Hansen is here for her Subsequent Wellness visit  Health Risk Assessment:   Patient rates overall health as good   Patient feels that their physical health rating is same  Patient is satisfied with their life  Eyesight was rated as same  Hearing was rated as same  Patient feels that their emotional and mental health rating is same  Patients states they are sometimes angry  Patient states they are never, rarely unusually tired/fatigued  Pain experienced in the last 7 days has been none  Patient states that she has experienced no weight loss or gain in last 6 months  Depression Screening:   PHQ-2 Score: 0      Fall Risk Screening: In the past year, patient has experienced: history of falling in past year    Number of falls: 1  Injured during fall?: Yes    Feels unsteady when standing or walking?: Yes    Worried about falling?: Yes      Urinary Incontinence Screening:   Patient has not leaked urine accidently in the last six months  Home Safety:  Patient has trouble with stairs inside or outside of their home  Patient has working smoke alarms and has working carbon monoxide detector  Home safety hazards include: none  Nutrition:   Current diet is Regular  Medications:   Patient is currently taking over-the-counter supplements  OTC medications include: see medication list  Patient is able to manage medications  Activities of Daily Living (ADLs)/Instrumental Activities of Daily Living (IADLs):   Walk and transfer into and out of bed and chair?: Yes  Dress and groom yourself?: Yes    Bathe or shower yourself?: Yes    Feed yourself? Yes  Do your laundry/housekeeping?: Yes  Manage your money, pay your bills and track your expenses?: Yes  Make your own meals?: Yes    Do your own shopping?: Yes    Previous Hospitalizations:   Any hospitalizations or ED visits within the last 12 months?: No      Advance Care Planning:   Living will: Yes    Durable POA for healthcare:  Yes    Advanced directive: Yes    Advanced directive counseling given: Yes    Five wishes given: Yes      Cognitive Screening:   Provider or family/friend/caregiver concerned regarding cognition?: No    PREVENTIVE SCREENINGS      Cardiovascular Screening:    General: Screening Not Indicated and History Lipid Disorder      Diabetes Screening:     General: Screening Not Indicated and History Diabetes      Colorectal Cancer Screening:     General: Screening Not Indicated      Breast Cancer Screening:     General: Screening Not Indicated      Cervical Cancer Screening:    General: Screening Not Indicated      Osteoporosis Screening:    General: Screening Not Indicated      Abdominal Aortic Aneurysm (AAA) Screening:        General: Screening Not Indicated      Lung Cancer Screening:     General: Screening Not Indicated      Hepatitis C Screening:    General: Screening Not Indicated      Preventive Screening Comments: Daughter had called yesterday and left me a message in regards to her mom's upcoming appointment  Patient states that I have permission to discuss today's visit with her daughter and any of her concerns, if necessary  Screening, Brief Intervention, and Referral to Treatment (SBIRT)    Screening  Typical number of drinks in a day: 0  Typical number of drinks in a week: 0  Interpretation: Low risk drinking behavior      AUDIT-C Screenin) How often did you have a drink containing alcohol in the past year? never  2) How many drinks did you have on a typical day when you were drinking in the past year? 0  3) How often did you have 6 or more drinks on one occasion in the past year? never    AUDIT-C Score: 0  Interpretation: Score 0-2 (female): Negative screen for alcohol misuse    Single Item Drug Screening:  How often have you used an illegal drug (including marijuana) or a prescription medication for non-medical reasons in the past year? never    Single Item Drug Screen Score: 0  Interpretation: Negative screen for possible drug use disorder    No exam data present     Physical Exam:     /72 (BP Location: Left arm, Patient Position: Sitting, Cuff Size: Large)   Pulse 80   Resp 16    5' 4" (1 626 m)   Wt 87 5 kg (193 lb)   LMP  (LMP Unknown)   BMI 33 13 kg/m²     Diabetic Foot Exam    Patient's shoes and socks removed  Right Foot/Ankle   Right Foot Inspection  Skin Exam: skin normal and skin intact  No dry skin, no warmth, no callus, no erythema, no maceration, no abnormal color, no pre-ulcer, no ulcer and no callus  Toe Exam: ROM and strength within normal limits  Sensory   Proprioception: intact  Monofilament testing: intact    Vascular  Capillary refills: < 3 seconds  The right DP pulse is 1+  The right PT pulse is 1+  Left Foot/Ankle  Left Foot Inspection  Skin Exam: skin normal and skin intact  No dry skin, no warmth, no erythema, no maceration, normal color, no pre-ulcer, no ulcer and no callus  Toe Exam: ROM and strength within normal limits  Sensory   Proprioception: intact  Monofilament testing: intact    Vascular  Capillary refills: < 3 seconds  The left DP pulse is 1+  The left PT pulse is 1+  Assign Risk Category  No deformity present  No loss of protective sensation  No weak pulses  Risk: 0    Physical Exam  Vitals and nursing note reviewed  Constitutional:       General: She is not in acute distress  Appearance: She is well-developed  She is not ill-appearing, toxic-appearing or diaphoretic  Comments: Patient is well groomed, and an excellent historian  She has a good understanding of her medical conditions  Her mood is good, affect is normal and she is engaged in conversation and answering questions appropriately  HENT:      Right Ear: Tympanic membrane normal       Left Ear: Tympanic membrane and external ear normal    Eyes:      General: No scleral icterus  Conjunctiva/sclera: Conjunctivae normal       Pupils: Pupils are equal, round, and reactive to light  Neck:      Vascular: No carotid bruit  Cardiovascular:      Rate and Rhythm: Normal rate and regular rhythm        Pulses: no weak pulses          Dorsalis pedis pulses are 1+ on the right side and 1+ on the left side  Posterior tibial pulses are 1+ on the right side and 1+ on the left side  Heart sounds: Normal heart sounds  No murmur heard  No friction rub  No gallop  Pulmonary:      Effort: Pulmonary effort is normal  No respiratory distress  Breath sounds: Normal breath sounds  No wheezing or rales  Musculoskeletal:         General: Normal range of motion  Cervical back: Normal range of motion and neck supple  Right lower leg: No edema  Left lower leg: No edema  Comments: Patient is ambulating well with a walker at present time  Patient has good range of motion of her left knee  She is able to flex and extend without difficulty  She is able to ambulate and bear weight without any difficulty  Feet:      Right foot:      Skin integrity: No ulcer, skin breakdown, erythema, warmth, callus or dry skin  Left foot:      Skin integrity: No ulcer, skin breakdown, erythema, warmth, callus or dry skin  Lymphadenopathy:      Cervical: No cervical adenopathy  Skin:     General: Skin is warm and dry  Coloration: Skin is not jaundiced  Comments: Fading ecchymoses on lateral aspect of left knee  Neurological:      Mental Status: She is alert and oriented to person, place, and time  Psychiatric:         Mood and Affect: Mood normal          Behavior: Behavior normal          Thought Content: Thought content normal          Judgment: Judgment normal       Comments: Mood is good  Eye contact is normal   Answering questions appropriately  Alert and oriented x3            Christopher Gonzalez MD

## 2022-10-28 ENCOUNTER — CLINICAL SUPPORT (OUTPATIENT)
Dept: FAMILY MEDICINE CLINIC | Facility: CLINIC | Age: 87
End: 2022-10-28

## 2022-10-28 DIAGNOSIS — Z23 NEEDS FLU SHOT: Primary | ICD-10-CM

## 2022-10-28 NOTE — PROGRESS NOTES
Patient present today for her high dose flu shot  Patient tolerated procedure and left in no distress

## 2022-11-03 ENCOUNTER — APPOINTMENT (OUTPATIENT)
Dept: LAB | Facility: CLINIC | Age: 87
End: 2022-11-03

## 2022-11-03 DIAGNOSIS — R05.9 COUGH: ICD-10-CM

## 2022-11-03 DIAGNOSIS — E11.65 UNCONTROLLED TYPE 2 DIABETES MELLITUS WITH HYPERGLYCEMIA, WITH LONG-TERM CURRENT USE OF INSULIN (HCC): ICD-10-CM

## 2022-11-03 DIAGNOSIS — Z79.4 UNCONTROLLED TYPE 2 DIABETES MELLITUS WITH HYPERGLYCEMIA, WITH LONG-TERM CURRENT USE OF INSULIN (HCC): ICD-10-CM

## 2022-11-03 DIAGNOSIS — C49.A2 GASTROINTESTINAL STROMAL TUMOR (GIST) OF STOMACH (HCC): ICD-10-CM

## 2022-11-03 DIAGNOSIS — R26.2 AMBULATORY DYSFUNCTION: ICD-10-CM

## 2022-11-03 DIAGNOSIS — J45.20 MILD INTERMITTENT ASTHMA WITHOUT COMPLICATION: ICD-10-CM

## 2022-11-03 DIAGNOSIS — R41.3 MEMORY DISTURBANCE: ICD-10-CM

## 2022-11-03 DIAGNOSIS — E27.8 LEFT ADRENAL MASS (HCC): ICD-10-CM

## 2022-11-03 DIAGNOSIS — I10 ESSENTIAL HYPERTENSION: ICD-10-CM
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
## 2022-11-03 DIAGNOSIS — F43.0 STRESS REACTION: ICD-10-CM

## 2022-11-03 DIAGNOSIS — Z00.00 MEDICARE ANNUAL WELLNESS VISIT, SUBSEQUENT: ICD-10-CM

## 2022-11-03 LAB
ALBUMIN SERPL BCP-MCNC: 3.4 G/DL (ref 3.5–5)
ALP SERPL-CCNC: 63 U/L (ref 46–116)
ALT SERPL W P-5'-P-CCNC: 17 U/L (ref 12–78)
ANION GAP SERPL CALCULATED.3IONS-SCNC: 3 MMOL/L (ref 4–13)
AST SERPL W P-5'-P-CCNC: 16 U/L (ref 5–45)
BASOPHILS # BLD AUTO: 0.07 THOUSANDS/ÂΜL (ref 0–0.1)
BASOPHILS NFR BLD AUTO: 1 % (ref 0–1)
BILIRUB SERPL-MCNC: 0.65 MG/DL (ref 0.2–1)
BUN SERPL-MCNC: 19 MG/DL (ref 5–25)
CALCIUM ALBUM COR SERPL-MCNC: 9.5 MG/DL (ref 8.3–10.1)
CALCIUM SERPL-MCNC: 9 MG/DL (ref 8.3–10.1)
CHLORIDE SERPL-SCNC: 105 MMOL/L (ref 96–108)
CO2 SERPL-SCNC: 29 MMOL/L (ref 21–32)
CREAT SERPL-MCNC: 0.92 MG/DL (ref 0.6–1.3)
EOSINOPHIL # BLD AUTO: 0.31 THOUSAND/ÂΜL (ref 0–0.61)
EOSINOPHIL NFR BLD AUTO: 3 % (ref 0–6)
ERYTHROCYTE [DISTWIDTH] IN BLOOD BY AUTOMATED COUNT: 12.8 % (ref 11.6–15.1)
GFR SERPL CREATININE-BSD FRML MDRD: 55 ML/MIN/1.73SQ M
GLUCOSE P FAST SERPL-MCNC: 81 MG/DL (ref 65–99)
HCT VFR BLD AUTO: 42.5 % (ref 34.8–46.1)
HGB BLD-MCNC: 13 G/DL (ref 11.5–15.4)
IMM GRANULOCYTES # BLD AUTO: 0.03 THOUSAND/UL (ref 0–0.2)
IMM GRANULOCYTES NFR BLD AUTO: 0 % (ref 0–2)
LYMPHOCYTES # BLD AUTO: 2.24 THOUSANDS/ÂΜL (ref 0.6–4.47)
LYMPHOCYTES NFR BLD AUTO: 22 % (ref 14–44)
MCH RBC QN AUTO: 27.8 PG (ref 26.8–34.3)
MCHC RBC AUTO-ENTMCNC: 30.6 G/DL (ref 31.4–37.4)
MCV RBC AUTO: 91 FL (ref 82–98)
MONOCYTES # BLD AUTO: 0.6 THOUSAND/ÂΜL (ref 0.17–1.22)
MONOCYTES NFR BLD AUTO: 6 % (ref 4–12)
NEUTROPHILS # BLD AUTO: 6.9 THOUSANDS/ÂΜL (ref 1.85–7.62)
NEUTS SEG NFR BLD AUTO: 68 % (ref 43–75)
NRBC BLD AUTO-RTO: 0 /100 WBCS
PLATELET # BLD AUTO: 250 THOUSANDS/UL (ref 149–390)
PMV BLD AUTO: 10.5 FL (ref 8.9–12.7)
POTASSIUM SERPL-SCNC: 3.7 MMOL/L (ref 3.5–5.3)
PROT SERPL-MCNC: 6.9 G/DL (ref 6.4–8.4)
RBC # BLD AUTO: 4.68 MILLION/UL (ref 3.81–5.12)
SODIUM SERPL-SCNC: 137 MMOL/L (ref 135–147)
T4 SERPL-MCNC: 7.6 UG/DL (ref 4.7–13.3)
TSH SERPL DL<=0.05 MIU/L-ACNC: 1.15 UIU/ML (ref 0.45–4.5)
VIT B12 SERPL-MCNC: 307 PG/ML (ref 100–900)
WBC # BLD AUTO: 10.15 THOUSAND/UL (ref 4.31–10.16)

## 2022-11-08 PROBLEM — E11.9 TYPE 2 DIABETES MELLITUS WITHOUT COMPLICATION, WITH LONG-TERM CURRENT USE OF INSULIN (HCC): Status: ACTIVE | Noted: 2022-11-08

## 2022-11-08 PROBLEM — Z79.4 TYPE 2 DIABETES MELLITUS WITHOUT COMPLICATION, WITH LONG-TERM CURRENT USE OF INSULIN (HCC): Status: ACTIVE | Noted: 2022-11-08

## 2022-11-08 LAB — METHYLMALONATE SERPL-SCNC: 238 NMOL/L (ref 0–378)

## 2022-11-09 ENCOUNTER — OFFICE VISIT (OUTPATIENT)
Dept: FAMILY MEDICINE CLINIC | Facility: CLINIC | Age: 87
End: 2022-11-09

## 2022-11-09 VITALS
HEART RATE: 70 BPM | HEIGHT: 64 IN | SYSTOLIC BLOOD PRESSURE: 122 MMHG | DIASTOLIC BLOOD PRESSURE: 72 MMHG | OXYGEN SATURATION: 98 % | WEIGHT: 187 LBS | BODY MASS INDEX: 31.92 KG/M2

## 2022-11-09 DIAGNOSIS — E11.9 TYPE 2 DIABETES MELLITUS WITHOUT COMPLICATION, WITH LONG-TERM CURRENT USE OF INSULIN (HCC): ICD-10-CM

## 2022-11-09 DIAGNOSIS — I10 ESSENTIAL HYPERTENSION: ICD-10-CM

## 2022-11-09 DIAGNOSIS — Z79.4 TYPE 2 DIABETES MELLITUS WITHOUT COMPLICATION, WITH LONG-TERM CURRENT USE OF INSULIN (HCC): ICD-10-CM

## 2022-11-09 DIAGNOSIS — R26.2 AMBULATORY DYSFUNCTION: Primary | ICD-10-CM

## 2022-11-09 DIAGNOSIS — F43.0 STRESS REACTION: ICD-10-CM
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
## 2022-11-09 NOTE — ASSESSMENT & PLAN NOTE
A1C stable at 6 7  Continue Levemir 22u at bedtime        Lab Results   Component Value Date    HGBA1C 6 7 (A) 09/28/2022

## 2022-11-09 NOTE — PROGRESS NOTES
Name: Reyna Liang      :       MRN: [id number]  Encounter Provider: Kingsley Linder MD  Encounter Date: 2022   Encounter department: [facility]     Mrs Teodora Meigs is an 51-year-old female who presents today to follow-up on recent blood work and also to see how she is doing after her recent fall  She has lost 6 pounds since her last visit 6 weeks ago  She notes she does not have time to eat because she is taking the time to take care of her office  She contines to drive  Did not have PT bc she looks after her   She would prefer to have home PT as it is very difficult for her to get out of the house  1  Ambulatory dysfunction  Comments:  Refer for Home PT    2  Fall, subsequent encounter  Comments:  DOing well without any further occurences  Home PT to help with strengthening nad balance  3  Type 2 diabetes mellitus without complication, with long-term current use of insulin (HCC)  Assessment & Plan:  A1C stable at 6 7  Continue Levemir 22u at bedtime  Lab Results   Component Value Date    HGBA1C 6 7 (A) 2022         4  Essential hypertension  Assessment & Plan:  Controlled on lisinopril hct      5  Stress reaction  Assessment & Plan:  Doing very well on sertraline 25 mg daily  Labs reviewed at length with patient today  Return to office in 3-4 months  Subjective      Chief Complaint   Patient presents with   • Follow-up   • Results       Mrs Teodora Meigs is an 51-year-old female who presents today to follow-up on recent blood work and also to see how she is doing after her recent fall  She has lost 6 pounds since her last visit 6 weeks ago  She contines to drive  Did not have PT bc she looks after herhusband  She would prefer to have home PT as it is very difficult for her to get out of the house    [facility]      Review of Systems   Constitutional:        See HPI   HENT: Negative for congestion, ear pain, mouth sores, sinus pressure and trouble swallowing  Eyes: Negative for discharge, redness and itching  Respiratory: Negative for apnea, cough, chest tightness, shortness of breath, wheezing and stridor  Cardiovascular: Negative for chest pain, palpitations and leg swelling  Gastrointestinal: Negative for abdominal distention, abdominal pain, blood in stool, constipation, diarrhea, nausea and vomiting  Endocrine: Negative for cold intolerance and heat intolerance  Genitourinary: Negative for difficulty urinating, dysuria, flank pain and urgency  Musculoskeletal: Negative for arthralgias and myalgias  Skin: Negative for rash  Neurological: Negative for dizziness, seizures, syncope, speech difficulty, weakness, light-headedness, numbness and headaches  Hematological: Negative for adenopathy  Psychiatric/Behavioral: Negative for agitation, behavioral problems, confusion and sleep disturbance  The patient is not nervous/anxious  Current Outpatient Medications on File Prior to Visit   Medication Sig   • acetaminophen (TYLENOL) 500 mg tablet Take 1,000 mg by mouth   • Advair Diskus 250-50 MCG/ACT inhaler INHALE ONE PUFF BY MOUTH TWICE A DAY   • aspirin 81 MG tablet Take 81 mg by mouth   • B-D UF III MINI PEN NEEDLES 31G X 5 MM MISC USE DAILY WITH INSULIN   • benazepril-hydrochlorthiazide (LOTENSIN HCT) 10-12 5 MG per tablet TAKE ONE TABLET BY MOUTH EVERY DAY   • Biotin 1 MG CAPS Take 1 mg by mouth daily   • ERGOCALCIFEROL PO Take 5,000 Units by mouth Daily   • glucose blood (OneTouch Ultra) test strip Use 1 each 2 (two) times a day Test 1-2 times daily   • insulin detemir (Levemir FlexTouch) 100 Units/mL injection pen Inject 22 units under the skin @ bedtime     • Insulin Pen Needle (B-D UF III MINI PEN NEEDLES) 31G X 5 MM MISC by Alternating Nares route 4 (four) times a day   • Lancets (onetouch ultrasoft) lancets TEST 1 TO 2 TIMES PER DAY   • lidocaine (XYLOCAINE) 1 % lidocaine HCl 10 mg/mL (1 %) injection solution   Take by injection route  • Multiple Vitamins-Minerals (HAIR SKIN AND NAILS FORMULA PO) Take 1 capsule by mouth daily   • psyllium (METAMUCIL) 58 6 % powder Take 1 packet by mouth daily     • sertraline (Zoloft) 25 mg tablet Take 1 tablet (25 mg total) by mouth daily   • simvastatin (ZOCOR) 10 mg tablet TAKE ONE TABLET BY MOUTH EVERY DAY AS DIRECTED       Objective     /72 (BP Location: Left arm, Patient Position: Sitting)   Pulse 70   Ht 5' 4" (1 626 m)   Wt 84 8 kg (187 lb)   LMP  (LMP Unknown)   SpO2 98%   BMI 32 10 kg/m²     Physical Exam  Vitals and nursing note reviewed  Constitutional:       General: She is not in acute distress  Appearance: Normal appearance  She is normal weight  She is not ill-appearing, toxic-appearing or diaphoretic  Eyes:      Conjunctiva/sclera: Conjunctivae normal    Neck:      Vascular: No carotid bruit  Cardiovascular:      Rate and Rhythm: Normal rate and regular rhythm  Pulses: Normal pulses  Heart sounds: Normal heart sounds  No murmur heard  No friction rub  No gallop  Pulmonary:      Effort: Pulmonary effort is normal  No respiratory distress  Breath sounds: Normal breath sounds  No stridor  No wheezing, rhonchi or rales  Chest:      Chest wall: No tenderness  Musculoskeletal:      Cervical back: Normal range of motion and neck supple  Right lower leg: No edema  Left lower leg: No edema  Comments: Using a walker for added stability  Skin:     Coloration: Skin is not jaundiced  Neurological:      Mental Status: She is alert  Psychiatric:         Mood and Affect: Mood normal          Behavior: Behavior normal          Thought Content:  Thought content normal          Judgment: Judgment normal        Marlen Galeas MD

## 2023-01-02 DIAGNOSIS — F43.0 STRESS REACTION: ICD-10-CM

## 2023-01-03 RX ORDER — SERTRALINE HYDROCHLORIDE 25 MG/1
TABLET, FILM COATED ORAL
Qty: 30 TABLET | Refills: 5 | Status: SHIPPED | OUTPATIENT
Start: 2023-01-03

## 2023-03-17 DIAGNOSIS — E11.65 UNCONTROLLED TYPE 2 DIABETES MELLITUS WITH HYPERGLYCEMIA (HCC): ICD-10-CM

## 2023-03-17 RX ORDER — INSULIN DETEMIR 100 [IU]/ML
INJECTION, SOLUTION SUBCUTANEOUS
Qty: 15 ML | Refills: 3 | Status: SHIPPED | OUTPATIENT
Start: 2023-03-17

## 2023-03-17 NOTE — TELEPHONE ENCOUNTER
Hello  This message is for Niko Morris in the prescription department My name is Aristides Almonte  She knows me and I'm on insulin  I need levemir  LEVEMIR injection  Yeah  And actually injection  And it's gonna  I'm gonna run out of my prescription  My prescription number is [id number], Lashaun lamar at the [facility]  And I'm a patient of Doctor Libby Rivera and knows me  She has my number  [phone] if there's any questions  Thank you  Goodbye

## 2023-05-23 DIAGNOSIS — E11.65 UNCONTROLLED TYPE 2 DIABETES MELLITUS WITH HYPERGLYCEMIA (HCC): ICD-10-CM

## 2023-05-24 RX ORDER — LANCETS
EACH MISCELLANEOUS
Qty: 100 EACH | Refills: 1 | Status: SHIPPED | OUTPATIENT
Start: 2023-05-24

## 2023-07-03 ENCOUNTER — TELEPHONE (OUTPATIENT)
Dept: FAMILY MEDICINE CLINIC | Facility: CLINIC | Age: 88
End: 2023-07-03

## 2023-07-03 NOTE — TELEPHONE ENCOUNTER
Recommend the brat diet if continues office visit if worsens report to ER or urgent care on the fourth

## 2023-07-03 NOTE — TELEPHONE ENCOUNTER
Voicemail from patient:  Hello. Yes, this message is for Doctor Juanita July. My name is Mattie Diaz and my number is [phone]. I have a very bad bout with diarrhea last night. I did take Pepto Bismol at the end of it and I'm sort of OK today, but I would like a call back and see if there's anything else that I can do or should be doing. Thank you very much. My name again is Mattie Diaz. My date of birth is [birth year]. She would like to know what to do.   Please advise

## 2023-07-07 DIAGNOSIS — F43.0 STRESS REACTION: ICD-10-CM

## 2023-07-10 RX ORDER — SERTRALINE HYDROCHLORIDE 25 MG/1
TABLET, FILM COATED ORAL
Qty: 30 TABLET | Refills: 5 | Status: SHIPPED | OUTPATIENT
Start: 2023-07-10 | End: 2023-07-19

## 2023-07-17 ENCOUNTER — TELEPHONE (OUTPATIENT)
Dept: FAMILY MEDICINE CLINIC | Facility: CLINIC | Age: 88
End: 2023-07-17

## 2023-07-17 NOTE — TELEPHONE ENCOUNTER
Patient called into the office in regards of her medication sertraline patient stated she was having weird Side affects she stated she wasn't able to think right and started acting goofy. Patient would like something else recommended. Please advise. Thank You

## 2023-07-19 ENCOUNTER — HOSPITAL ENCOUNTER (OUTPATIENT)
Dept: RADIOLOGY | Facility: HOSPITAL | Age: 88
Discharge: HOME | End: 2023-07-19
Payer: MEDICARE

## 2023-07-19 ENCOUNTER — OFFICE VISIT (OUTPATIENT)
Dept: FAMILY MEDICINE CLINIC | Facility: CLINIC | Age: 88
End: 2023-07-19
Payer: MEDICARE

## 2023-07-19 VITALS
DIASTOLIC BLOOD PRESSURE: 74 MMHG | WEIGHT: 189 LBS | OXYGEN SATURATION: 99 % | SYSTOLIC BLOOD PRESSURE: 128 MMHG | HEIGHT: 64 IN | HEART RATE: 81 BPM | BODY MASS INDEX: 32.27 KG/M2 | TEMPERATURE: 98.2 F

## 2023-07-19 DIAGNOSIS — C49.A2 GASTROINTESTINAL STROMAL TUMOR (GIST) OF STOMACH (HCC): ICD-10-CM

## 2023-07-19 DIAGNOSIS — R41.3 MEMORY DEFICIT: ICD-10-CM

## 2023-07-19 DIAGNOSIS — Z79.4 TYPE 2 DIABETES MELLITUS WITHOUT COMPLICATION, WITH LONG-TERM CURRENT USE OF INSULIN (HCC): ICD-10-CM

## 2023-07-19 DIAGNOSIS — E55.9 VITAMIN D DEFICIENCY: ICD-10-CM

## 2023-07-19 DIAGNOSIS — R40.4 TRANSIENT ALTERATION OF AWARENESS: Primary | ICD-10-CM

## 2023-07-19 DIAGNOSIS — I65.29 STENOSIS OF CAROTID ARTERY, UNSPECIFIED LATERALITY: ICD-10-CM

## 2023-07-19 DIAGNOSIS — R40.4 TRANSIENT ALTERATION OF AWARENESS: ICD-10-CM

## 2023-07-19 DIAGNOSIS — E11.9 TYPE 2 DIABETES MELLITUS WITHOUT COMPLICATION, WITH LONG-TERM CURRENT USE OF INSULIN (HCC): ICD-10-CM

## 2023-07-19 DIAGNOSIS — E27.8 LEFT ADRENAL MASS (HCC): ICD-10-CM

## 2023-07-19 DIAGNOSIS — E78.2 MIXED HYPERLIPIDEMIA: ICD-10-CM

## 2023-07-19 DIAGNOSIS — E53.8 VITAMIN B12 DEFICIENCY: ICD-10-CM

## 2023-07-19 DIAGNOSIS — I10 ESSENTIAL HYPERTENSION: ICD-10-CM

## 2023-07-19 DIAGNOSIS — I77.1 STRICTURE OF ARTERY (HCC): ICD-10-CM

## 2023-07-19 LAB — SL AMB POCT HEMOGLOBIN AIC: 6.6 (ref ?–6.5)

## 2023-07-19 PROCEDURE — 70450 CT HEAD/BRAIN W/O DYE: CPT

## 2023-07-19 PROCEDURE — G1004 CDSM NDSC: HCPCS

## 2023-07-19 PROCEDURE — 83036 HEMOGLOBIN GLYCOSYLATED A1C: CPT | Performed by: FAMILY MEDICINE

## 2023-07-19 PROCEDURE — 99215 OFFICE O/P EST HI 40 MIN: CPT | Performed by: FAMILY MEDICINE

## 2023-07-19 NOTE — ASSESSMENT & PLAN NOTE
Last Vit B12 was in the 300s, but she has not started any supplementation. Recheck B12 and MMA. If her levels are low, this may be contributing to her mental status changes and would start IM injections asap.

## 2023-07-19 NOTE — ASSESSMENT & PLAN NOTE
Pt reports transient episodes of confusion over the past few months. Etiology is unclear. Check CT brain, CBC, CMP, TFTs, B12. Check carotid U/S. Consider ECHO pending results. R/O dementia, B12 deficiency, stress, depression, hypothyroidism, anemia, etc  Cont asa 81mg for now. F/U in 2 weeks.

## 2023-07-19 NOTE — ASSESSMENT & PLAN NOTE
Given recent mental status changes / confusion, will decrease basal insulin for now, pending BW results, to make sure that she is not experiencing hypoglycemic episodes. Decrease to 12u qHS. Will readdress in 2 weeks.     Lab Results   Component Value Date    HGBA1C 6.6 (A) 07/19/2023

## 2023-07-19 NOTE — ASSESSMENT & PLAN NOTE
Appears more episodic than acute or gradual. She seems to return to baseline. Difficult to tell if it is brought on by stress, but she does seem overwhelmed with the care of her elderly  who has had a stroke. Work-up started. Consider geriatric eval.  Certainly looks well and is engaging and interactive and an excellent historian today.

## 2023-07-19 NOTE — PROGRESS NOTES
Name: Kindra Mota      : [birth year]      MRN: [id number]  Encounter Provider: Philippe Durant MD  Encounter Date: 2023   Encounter department: [address] [place] [address]       See HPI. 1. Transient alteration of awareness  Assessment & Plan:  Pt reports transient episodes of confusion over the past few months. Etiology is unclear. Check CT brain, CBC, CMP, TFTs, B12. Check carotid U/S. Consider ECHO pending results. R/O dementia, B12 deficiency, stress, depression, hypothyroidism, anemia, etc  Cont asa 81mg for now. F/U in 2 weeks. Orders:  -     CT head wo contrast; Future; Expected date: 2023  -     CBC and differential; Future  -     Comprehensive metabolic panel; Future  -     Lipid panel; Future  -     T4; Future  -     TSH, 3rd generation; Future  -     Vitamin B12; Future  -     Methylmalonic acid, serum; Future  -     VAS carotid complete study; Future; Expected date: 2023  -     Vitamin D 25 hydroxy; Future    2. Memory deficit  Assessment & Plan:  Appears more episodic than acute or gradual. She seems to return to baseline. Difficult to tell if it is brought on by stress, but she does seem overwhelmed with the care of her elderly  who has had a stroke. Work-up started. Consider geriatric eval.  Certainly looks well and is engaging and interactive and an excellent historian today. Orders:  -     CT head wo contrast; Future; Expected date: 2023  -     CBC and differential; Future  -     Comprehensive metabolic panel; Future  -     Lipid panel; Future  -     T4; Future  -     TSH, 3rd generation; Future  -     Vitamin B12; Future  -     Methylmalonic acid, serum; Future  -     VAS carotid complete study; Future; Expected date: 2023  -     Vitamin D 25 hydroxy; Future    3.  Type 2 diabetes mellitus without complication, with long-term current use of insulin ([address])  Assessment & Plan:  Given recent mental status changes / confusion, will decrease basal insulin for now, pending BW results, to make sure that she is not experiencing hypoglycemic episodes. Decrease to 12u qHS. Will readdress in 2 weeks. Lab Results   Component Value Date    HGBA1C 6.6 (A) 07/19/2023       Orders:  -     POCT hemoglobin A1c  -     CBC and differential; Future  -     Comprehensive metabolic panel; Future  -     Lipid panel; Future  -     T4; Future  -     TSH, 3rd generation; Future  -     Vitamin B12; Future  -     Methylmalonic acid, serum; Future  -     Vitamin D 25 hydroxy; Future    4. Mixed hyperlipidemia  Assessment & Plan:  Continue simvastatin 10mg daily. Check lipids. 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 Orders:  -     CBC and differential; Future  -     Comprehensive metabolic panel; Future  -     Lipid panel; Future  -     T4; Future  -     TSH, 3rd generation; Future  -     Vitamin B12; Future  -     Methylmalonic acid, serum; Future  -     Vitamin D 25 hydroxy; Future    6. Essential hypertension  Assessment & Plan:  BP is well controlled. Continue benzapril HCT. Check CMP. Orders:  -     CBC and differential; Future  -     Comprehensive metabolic panel; Future  -     Lipid panel; Future  -     T4; Future  -     TSH, 3rd generation; Future  -     Vitamin B12; Future  -     Methylmalonic acid, serum; Future  -     Vitamin D 25 hydroxy; Future    7. Vitamin D deficiency  Assessment & Plan:  Recheck Vit D level. It is unclear today if she is on supplements. Orders:  -     CBC and differential; Future  -     Comprehensive metabolic panel;  Future  -     Lipid panel; Future  -     T4; Future  -     TSH, 3rd generation; Future  -     Vitamin B12; Future  -     Methylmalonic acid, serum; Future  -     Vitamin D 25 hydroxy; Future    8. Vitamin B12 deficiency  Assessment & Plan:  Last Vit B12 was in the 300s, but she has not started any supplementation. Recheck B12 and MMA. If her levels are low, this may be contributing to her mental status changes and would start IM injections asap. Orders:  -     Vitamin B12; Future  -     Methylmalonic acid, serum; Future    9. Stenosis of carotid artery, unspecified laterality  Assessment & Plan:  Carotid U/S to check for stability. Orders:  -     VAS carotid complete study; Future; Expected date: 07/19/2023    10. Stricture of artery [facility])  Assessment & Plan:  Hx of carotid stenosis. Recheck carotid dopplers. Orders:  -     VAS carotid complete study; Future; Expected date: 07/19/2023    11. Left adrenal mass [facility])  Assessment & Plan:  Consider re-imaging. Await labs and brain imaging. Long discussion with pt and her daughter Lieutenant Benitez, today. Discussed symptoms and work-up; possible differential diagnoses. Recommend she go to ER if sx recur. She does not really want to go to ER or have MRI brain, but she voices understanding of medical concerns and possibility for stroke,      BMI Counseling: Body mass index is 32.44 kg/m². The BMI is above normal. Nutrition recommendations include encouraging healthy choices of fruits and vegetables, limiting drinks that contain sugar and moderation in carbohydrate intake. Rationale for BMI follow-up plan is due to patient being overweight or obese. Falls Plan of Care: balance, strength, and gait training instructions were provided. Uses walker and ambulates well. Subjective      Chief Complaint   Patient presents with   • Diarrhea     Pt has had issues with her bowels over a 2 week period diarrhea then constipation. Pt recently d/c her Sertraline. Pt states her FBS this a.m. was 70.  mgb   • Memory Loss     Pt states she has had a change in her memory. mgb         Here with daughter Susie Conway. Pt reports that this month "has not been good." She blames it on sertraline. She feels confused. Her  asks her the same questions over and over again. She will answer it and then go to do something in the house and then is constantly distracted by her . Then forgets what she was going to do. She notes a time that she went to the bathroom and forgot what she was supposed to do. One day got up to brush teeth and forgot what to do. Took a long time to get dressed. Couldn't  Put her earrings in.  2 days prior to that it was not as bad. She has since stopped sertraline. (Daughter did not know about any of the above)    Today, she overslept for her appt. Daughter went to pick her up and notes she woke up very confused. At this moment she is fine and totally herself. Takes Lantus 22u qhs. Am sugar today was 70. A1C is 6.6. She does not know if her sugar runs low     She denies any physical weakness or numbness. No visual or speech, language difficulties. Past medical history of anemia associated with GI bleed. History of GIST tumor with conservative management and monitoring. She does admit to feeling overwhelmed with taking care of her  but denies feeling sad or depressed. She remarks that the above symptoms seem to wax and wane, as opposed to having sudden or gradual onset and then continuing to worsen. Of note, she does have a history of low vitamin B12 but has never been on supplements. Remote hx of PE. Review of Systems   Constitutional:        See HPI   HENT: Negative for congestion, ear pain, mouth sores, sinus pressure and trouble swallowing. Eyes: Negative for discharge, redness and itching. Respiratory: Negative for apnea, cough, chest tightness, shortness of breath, wheezing and stridor.     Cardiovascular: Negative for chest pain, palpitations and leg swelling. Gastrointestinal: Negative for abdominal distention, abdominal pain, blood in stool, constipation, diarrhea, nausea and vomiting. Reports today that BMs have normalized. Endocrine: Negative for cold intolerance and heat intolerance. Genitourinary: Negative for difficulty urinating, dysuria, flank pain and urgency. Musculoskeletal: Negative for arthralgias and myalgias. Skin: Negative for rash. Neurological: Negative for dizziness, seizures, syncope, speech difficulty, weakness, light-headedness, numbness and headaches. See HPI   Hematological: Negative for adenopathy. Psychiatric/Behavioral: Positive for confusion. Negative for agitation, behavioral problems and sleep disturbance. The patient is not nervous/anxious. See HPI       Current Outpatient Medications on File Prior to Visit   Medication Sig   • acetaminophen (TYLENOL) 500 mg tablet Take 1,000 mg by mouth   • Advair Diskus 250-50 MCG/ACT inhaler INHALE ONE PUFF BY MOUTH TWICE A DAY   • aspirin 81 MG tablet Take 81 mg by mouth   • B-D UF III MINI PEN NEEDLES 31G X 5 MM MISC USE DAILY WITH INSULIN   • benazepril-hydrochlorthiazide (LOTENSIN HCT) 10-12.5 MG per tablet TAKE ONE TABLET BY MOUTH EVERY DAY   • Biotin 1 MG CAPS Take 1 mg by mouth daily   • ERGOCALCIFEROL PO Take 5,000 Units by mouth Daily   • glucose blood (OneTouch Ultra) test strip Use 1 each 2 (two) times a day Test 1-2 times daily   • insulin detemir (Levemir FlexTouch) 100 Units/mL injection pen Inject 22 units under the skin @ bedtime. • Insulin Pen Needle (B-D UF III MINI PEN NEEDLES) 31G X 5 MM MISC by Alternating Nares route 4 (four) times a day   • Lancets (onetouch ultrasoft) lancets TEST BLOOD SUGARS ONCE TO TWICE PER DAY   • lidocaine (XYLOCAINE) 1 % lidocaine HCl 10 mg/mL (1 %) injection solution   Take by injection route.    • Multiple Vitamins-Minerals (HAIR SKIN AND NAILS FORMULA PO) Take 1 capsule by mouth daily • psyllium (METAMUCIL) 58.6 % powder Take 1 packet by mouth daily     • simvastatin (ZOCOR) 10 mg tablet TAKE ONE TABLET BY MOUTH EVERY DAY AS DIRECTED   • [DISCONTINUED] sertraline (ZOLOFT) 25 mg tablet TAKE ONE TABLET BY MOUTH EVERY DAY (Patient not taking: Reported on 7/19/2023)       Objective     /74 (BP Location: Left arm, Patient Position: Sitting, Cuff Size: Large)   Pulse 81   Temp 98.2 °F (36.8 °C) (Temporal)   Ht 5' 4" (1.626 m)   Wt 85.7 kg (189 lb)   LMP  (LMP Unknown)   SpO2 99%   BMI 32.44 kg/m²     Physical Exam  Vitals and nursing note reviewed. Constitutional:       General: She is not in acute distress. Appearance: Normal appearance. She is normal weight. She is not ill-appearing, toxic-appearing or diaphoretic. HENT:      Head: Normocephalic. Mouth/Throat:      Comments: Tongue midline  Eyes:      General: No scleral icterus. Extraocular Movements: Extraocular movements intact. Conjunctiva/sclera: Conjunctivae normal.      Pupils: Pupils are equal, round, and reactive to light. Neck:      Vascular: No carotid bruit. Cardiovascular:      Rate and Rhythm: Normal rate and regular rhythm. Pulses: Normal pulses. Heart sounds: Normal heart sounds. No murmur heard. No friction rub. No gallop. Pulmonary:      Effort: Pulmonary effort is normal. No respiratory distress. Breath sounds: Normal breath sounds. No stridor. No wheezing, rhonchi or rales. Chest:      Chest wall: No tenderness. Musculoskeletal:         General: No swelling, tenderness or signs of injury. Cervical back: Normal range of motion and neck supple. No rigidity or tenderness. Right lower leg: No edema. Left lower leg: No edema. Skin:     Coloration: Skin is not jaundiced. Neurological:      General: No focal deficit present. Mental Status: She is alert and oriented to person, place, and time. Mental status is at baseline. Cranial Nerves:  No cranial nerve deficit, dysarthria or facial asymmetry. Sensory: No sensory deficit. Motor: No weakness, tremor, atrophy or abnormal muscle tone. Coordination: Romberg sign negative. Finger-Nose-Finger Test normal.      Comments: Negative nystagmus. Sensory intact. Answering all questions appropriately. Well groomed. Good historian. Psychiatric:         Mood and Affect: Mood normal.         Behavior: Behavior normal.         Thought Content: Thought content normal.         Judgment: Judgment normal.      Comments: Affect normal; eye contact good. In good spirits. Smiling and cooperative.        Fadia Avelar MD

## 2023-07-20 ENCOUNTER — TELEPHONE (OUTPATIENT)
Dept: FAMILY MEDICINE CLINIC | Facility: CLINIC | Age: 88
End: 2023-07-20

## 2023-07-20 ENCOUNTER — APPOINTMENT (OUTPATIENT)
Dept: LAB | Facility: CLINIC | Age: 88
End: 2023-07-20
Payer: MEDICARE

## 2023-07-20 DIAGNOSIS — E78.2 MIXED HYPERLIPIDEMIA: ICD-10-CM

## 2023-07-20 DIAGNOSIS — R40.4 TRANSIENT ALTERATION OF AWARENESS: ICD-10-CM

## 2023-07-20 DIAGNOSIS — C49.A2 GASTROINTESTINAL STROMAL TUMOR (GIST) OF STOMACH (HCC): ICD-10-CM

## 2023-07-20 DIAGNOSIS — Z79.4 TYPE 2 DIABETES MELLITUS WITHOUT COMPLICATION, WITH LONG-TERM CURRENT USE OF INSULIN (HCC): ICD-10-CM

## 2023-07-20 DIAGNOSIS — R41.3 MEMORY DEFICIT: ICD-10-CM

## 2023-07-20 DIAGNOSIS — E53.8 VITAMIN B12 DEFICIENCY: ICD-10-CM

## 2023-07-20 DIAGNOSIS — E55.9 VITAMIN D DEFICIENCY: ICD-10-CM

## 2023-07-20 DIAGNOSIS — I10 ESSENTIAL HYPERTENSION: ICD-10-CM

## 2023-07-20 DIAGNOSIS — E11.9 TYPE 2 DIABETES MELLITUS WITHOUT COMPLICATION, WITH LONG-TERM CURRENT USE OF INSULIN (HCC): ICD-10-CM

## 2023-07-20 LAB
25(OH)D3 SERPL-MCNC: 34.4 NG/ML (ref 30–100)
ALBUMIN SERPL BCP-MCNC: 3.3 G/DL (ref 3.5–5)
ALP SERPL-CCNC: 65 U/L (ref 46–116)
ALT SERPL W P-5'-P-CCNC: 17 U/L (ref 12–78)
ANION GAP SERPL CALCULATED.3IONS-SCNC: 5 MMOL/L
AST SERPL W P-5'-P-CCNC: 20 U/L (ref 5–45)
BASOPHILS # BLD AUTO: 0.07 THOUSANDS/ÂΜL (ref 0–0.1)
BASOPHILS NFR BLD AUTO: 1 % (ref 0–1)
BILIRUB SERPL-MCNC: 0.81 MG/DL (ref 0.2–1)
BUN SERPL-MCNC: 14 MG/DL (ref 5–25)
CALCIUM ALBUM COR SERPL-MCNC: 9.6 MG/DL (ref 8.3–10.1)
CALCIUM SERPL-MCNC: 9 MG/DL (ref 8.3–10.1)
CHLORIDE SERPL-SCNC: 107 MMOL/L (ref 96–108)
CHOLEST SERPL-MCNC: 156 MG/DL
CO2 SERPL-SCNC: 29 MMOL/L (ref 21–32)
CREAT SERPL-MCNC: 0.86 MG/DL (ref 0.6–1.3)
EOSINOPHIL # BLD AUTO: 0.34 THOUSAND/ÂΜL (ref 0–0.61)
EOSINOPHIL NFR BLD AUTO: 4 % (ref 0–6)
ERYTHROCYTE [DISTWIDTH] IN BLOOD BY AUTOMATED COUNT: 13.3 % (ref 11.6–15.1)
GFR SERPL CREATININE-BSD FRML MDRD: 60 ML/MIN/1.73SQ M
GLUCOSE P FAST SERPL-MCNC: 95 MG/DL (ref 65–99)
HCT VFR BLD AUTO: 42.5 % (ref 34.8–46.1)
HDLC SERPL-MCNC: 67 MG/DL
HGB BLD-MCNC: 12.8 G/DL (ref 11.5–15.4)
IMM GRANULOCYTES # BLD AUTO: 0.05 THOUSAND/UL (ref 0–0.2)
IMM GRANULOCYTES NFR BLD AUTO: 1 % (ref 0–2)
LDLC SERPL CALC-MCNC: 74 MG/DL (ref 0–100)
LYMPHOCYTES # BLD AUTO: 1.85 THOUSANDS/ÂΜL (ref 0.6–4.47)
LYMPHOCYTES NFR BLD AUTO: 20 % (ref 14–44)
MCH RBC QN AUTO: 27.9 PG (ref 26.8–34.3)
MCHC RBC AUTO-ENTMCNC: 30.1 G/DL (ref 31.4–37.4)
MCV RBC AUTO: 93 FL (ref 82–98)
MONOCYTES # BLD AUTO: 0.52 THOUSAND/ÂΜL (ref 0.17–1.22)
MONOCYTES NFR BLD AUTO: 6 % (ref 4–12)
NEUTROPHILS # BLD AUTO: 6.67 THOUSANDS/ÂΜL (ref 1.85–7.62)
NEUTS SEG NFR BLD AUTO: 68 % (ref 43–75)
NONHDLC SERPL-MCNC: 89 MG/DL
NRBC BLD AUTO-RTO: 0 /100 WBCS
PLATELET # BLD AUTO: 265 THOUSANDS/UL (ref 149–390)
PMV BLD AUTO: 10.3 FL (ref 8.9–12.7)
POTASSIUM SERPL-SCNC: 4 MMOL/L (ref 3.5–5.3)
PROT SERPL-MCNC: 6.6 G/DL (ref 6.4–8.4)
RBC # BLD AUTO: 4.59 MILLION/UL (ref 3.81–5.12)
SODIUM SERPL-SCNC: 141 MMOL/L (ref 135–147)
T4 SERPL-MCNC: 8.97 UG/DL (ref 6.09–12.23)
TRIGL SERPL-MCNC: 76 MG/DL
TSH SERPL DL<=0.05 MIU/L-ACNC: 1.24 UIU/ML (ref 0.45–4.5)
VIT B12 SERPL-MCNC: 221 PG/ML (ref 180–914)
WBC # BLD AUTO: 9.5 THOUSAND/UL (ref 4.31–10.16)

## 2023-07-20 PROCEDURE — 85025 COMPLETE CBC W/AUTO DIFF WBC: CPT

## 2023-07-20 PROCEDURE — 80061 LIPID PANEL: CPT

## 2023-07-20 PROCEDURE — 84436 ASSAY OF TOTAL THYROXINE: CPT

## 2023-07-20 PROCEDURE — 82607 VITAMIN B-12: CPT

## 2023-07-20 PROCEDURE — 84443 ASSAY THYROID STIM HORMONE: CPT

## 2023-07-20 PROCEDURE — 80053 COMPREHEN METABOLIC PANEL: CPT

## 2023-07-20 PROCEDURE — 83918 ORGANIC ACIDS TOTAL QUANT: CPT

## 2023-07-20 PROCEDURE — 36415 COLL VENOUS BLD VENIPUNCTURE: CPT

## 2023-07-20 PROCEDURE — 82306 VITAMIN D 25 HYDROXY: CPT

## 2023-07-20 NOTE — TELEPHONE ENCOUNTER
----- Message from Sarabjit Aguilar MD sent at 7/19/2023  4:53 PM EDT -----  Please notify patient that her recent head CT looked good without any acute findings.   Thank you,  CB

## 2023-07-21 ENCOUNTER — TELEPHONE (OUTPATIENT)
Dept: FAMILY MEDICINE CLINIC | Facility: CLINIC | Age: 88
End: 2023-07-21

## 2023-07-21 DIAGNOSIS — E53.8 VITAMIN B12 DEFICIENCY: Primary | ICD-10-CM

## 2023-07-21 RX ORDER — CYANOCOBALAMIN 1000 UG/ML
1000 INJECTION, SOLUTION INTRAMUSCULAR; SUBCUTANEOUS WEEKLY
Status: SHIPPED | OUTPATIENT
Start: 2023-08-01 | End: 2023-08-29

## 2023-07-21 RX ORDER — CYANOCOBALAMIN 1000 UG/ML
1000 INJECTION, SOLUTION INTRAMUSCULAR; SUBCUTANEOUS
Status: SHIPPED | OUTPATIENT
Start: 2023-09-04

## 2023-07-21 RX ORDER — CYANOCOBALAMIN 1000 UG/ML
1000 INJECTION, SOLUTION INTRAMUSCULAR; SUBCUTANEOUS DAILY
Status: SHIPPED | OUTPATIENT
Start: 2023-07-24 | End: 2023-07-31

## 2023-07-21 NOTE — TELEPHONE ENCOUNTER
Pt's labs reviewed. Looks very good but she is B12 deficicent. Will start IM replacement asap - daily for 1 week, then weekly for 4 weeks, then monthly. Will notify and order.

## 2023-07-24 ENCOUNTER — TELEPHONE (OUTPATIENT)
Dept: FAMILY MEDICINE CLINIC | Facility: CLINIC | Age: 88
End: 2023-07-24

## 2023-07-24 LAB — METHYLMALONATE SERPL-SCNC: 428 NMOL/L (ref 0–378)

## 2023-07-24 NOTE — TELEPHONE ENCOUNTER
Daughter Linda Myron called she wanted to let Dr. Rosey Ryan know that mom is in [facility] and being transferred to Dr. Rox Perry at [facility]. Her right hip came away from the socket and he is going to put it back in on this Thursday. Hazel Tenorioex has an appointment with Dr. Bayron Michel on 8/1/23 She did not cancel it yet if mom is out of hospital and able to come in she would like to keep the appointment. Linda Arenas will let us know.

## 2023-08-17 ENCOUNTER — TRANSITIONAL CARE MANAGEMENT (OUTPATIENT)
Dept: FAMILY MEDICINE CLINIC | Facility: CLINIC | Age: 88
End: 2023-08-17

## 2023-08-22 DIAGNOSIS — E11.65 UNCONTROLLED TYPE 2 DIABETES MELLITUS WITH HYPERGLYCEMIA (HCC): ICD-10-CM

## 2023-08-23 RX ORDER — BLOOD SUGAR DIAGNOSTIC
STRIP MISCELLANEOUS
Qty: 100 STRIP | Refills: 3 | Status: SHIPPED | OUTPATIENT
Start: 2023-08-23

## 2023-08-23 RX ORDER — PEN NEEDLE, DIABETIC 31 GX3/16"
NEEDLE, DISPOSABLE MISCELLANEOUS 4 TIMES DAILY
Qty: 100 EACH | Refills: 3 | Status: SHIPPED | OUTPATIENT
Start: 2023-08-23

## 2023-08-23 RX ORDER — LANCETS
EACH MISCELLANEOUS
Qty: 100 EACH | Refills: 1 | Status: SHIPPED | OUTPATIENT
Start: 2023-08-23

## 2023-08-26 DIAGNOSIS — E78.00 PURE HYPERCHOLESTEROLEMIA: ICD-10-CM

## 2023-08-26 DIAGNOSIS — I10 ESSENTIAL HYPERTENSION: ICD-10-CM

## 2023-08-28 RX ORDER — SIMVASTATIN 10 MG
TABLET ORAL
Qty: 90 TABLET | Refills: 3 | Status: SHIPPED | OUTPATIENT
Start: 2023-08-28

## 2023-09-07 ENCOUNTER — OFFICE VISIT (OUTPATIENT)
Dept: FAMILY MEDICINE CLINIC | Facility: CLINIC | Age: 88
End: 2023-09-07
Payer: MEDICARE

## 2023-09-07 VITALS
WEIGHT: 192.6 LBS | OXYGEN SATURATION: 95 % | SYSTOLIC BLOOD PRESSURE: 124 MMHG | HEIGHT: 64 IN | BODY MASS INDEX: 32.88 KG/M2 | HEART RATE: 59 BPM | RESPIRATION RATE: 16 BRPM | DIASTOLIC BLOOD PRESSURE: 78 MMHG | TEMPERATURE: 98 F

## 2023-09-07 DIAGNOSIS — R26.2 AMBULATORY DYSFUNCTION: Primary | ICD-10-CM

## 2023-09-07 DIAGNOSIS — E27.8 LEFT ADRENAL MASS (HCC): ICD-10-CM

## 2023-09-07 DIAGNOSIS — E11.9 TYPE 2 DIABETES MELLITUS WITHOUT COMPLICATION, WITH LONG-TERM CURRENT USE OF INSULIN (HCC): ICD-10-CM

## 2023-09-07 DIAGNOSIS — Z79.4 TYPE 2 DIABETES MELLITUS WITHOUT COMPLICATION, WITH LONG-TERM CURRENT USE OF INSULIN (HCC): ICD-10-CM

## 2023-09-07 DIAGNOSIS — C49.A2 GASTROINTESTINAL STROMAL TUMOR (GIST) OF STOMACH (HCC): ICD-10-CM

## 2023-09-07 DIAGNOSIS — E78.2 MIXED HYPERLIPIDEMIA: ICD-10-CM

## 2023-09-07 DIAGNOSIS — I10 ESSENTIAL HYPERTENSION: ICD-10-CM

## 2023-09-07 PROCEDURE — 99214 OFFICE O/P EST MOD 30 MIN: CPT | Performed by: PHYSICIAN ASSISTANT

## 2023-09-07 RX ORDER — LOPERAMIDE HYDROCHLORIDE 2 MG/1
2 CAPSULE ORAL 2 TIMES DAILY
COMMUNITY

## 2023-09-07 RX ORDER — LANOLIN ALCOHOL/MO/W.PET/CERES
3 CREAM (GRAM) TOPICAL
COMMUNITY
Start: 2023-08-21

## 2023-09-07 NOTE — PATIENT INSTRUCTIONS
Assessment/plan:  1. Ambulatory dysfunction-status post fall at home and hospitalization with dislocated hip. 2.  Type 2 diabetes-hemoglobin A1c is stable at 6.8 with basal insulin therapy. Patient recently reintroduced 12 units of insulin nightly with morning sugars coming down to around 1 60-1 80 status post surgery. I would recommend she titrate to 15 units nightly over the next week and contact me with sugar readings if necessary. 3.  Benign essential hypertension-stable on benazepril and hydrochlorothiazide  4. Hyperlipidemia-stable on simvastatin 10 mg daily. No medication changes. 5.  GIST tumor-noted in the chart in 2019. Following with Dr. Amie Storm. 6.  Left adrenal nodule-noted in 2021. Following with Dr. Enrike Swanson.  7.  Edema-patient with 1-2+ bilateral pitting edema of the lower extremity. Recommend resuming benazepril with hydrochlorothiazide initially. If this does not improve we would consider using Lasix for period of time but should assess kidney function and potassium levels if needed. 8.  Diarrhea-patient with persistent symptoms. She states that she did have stool cultures and C. difficile while she was in [facility] which was negative however I am not able to find these results. Her symptoms do seem better in the past week since her sugars have been coming down with reintroducing insulin therapy. We will try titrating this gradually so her sugars are better and she may resume 1 teaspoon of Metamucil daily with water to help bulk stool. If she is having persistent symptoms we will have her follow-up with Dr. Saad Jackson. She may try to cut back on Imodium to as necessary.

## 2023-09-07 NOTE — PROGRESS NOTES
Name: Meenakshi Calzada      :       MRN: [id number]  Encounter Provider: Paola Weaver PA-C  Encounter Date: 2023   Encounter department: [facility] PRIMARY CARE    Assessment & Plan     Patient Instructions   Assessment/plan:  1. Ambulatory dysfunction-status post fall at home and hospitalization with dislocated hip. 2.  Type 2 diabetes-hemoglobin A1c is stable at 6.8 with basal insulin therapy. Patient recently reintroduced 12 units of insulin nightly with morning sugars coming down to around 1 60-1 80 status post surgery. I would recommend she titrate to 15 units nightly over the next week and contact me with sugar readings if necessary. 3.  Benign essential hypertension-stable on benazepril and hydrochlorothiazide  4. Hyperlipidemia-stable on simvastatin 10 mg daily. No medication changes. 5.  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 Following with Dr. Leela Pond. 6.  Left adrenal nodule-noted in . Following with Dr. Lucho Cash.  7.  Edema-patient with 1-2+ bilateral pitting edema of the lower extremity. Recommend resuming benazepril with hydrochlorothiazide initially. If this does not improve we would consider using Lasix for period of time but should assess kidney function and potassium levels if needed. 8.  Diarrhea-patient with persistent symptoms. She states that she did have stool cultures and C. difficile while she was in [place] home which was negative however I am not able to find these results. Her symptoms do seem better in the past week since her sugars have been coming down with reintroducing insulin therapy. We will try titrating this gradually so her sugars are better and she may resume 1 teaspoon of Metamucil daily with water to help bulk stool. If she is having persistent symptoms we will have her follow-up with Dr. Abdi Vázquez. She may try to cut back on Imodium to as necessary. 1. Ambulatory dysfunction    2.  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 Type 2 diabetes mellitus without complication, with long-term current use of insulin ([address])    4. Essential hypertension    5. Left adrenal mass (HCC)    6. Mixed hyperlipidemia           Subjective      HPI: This is an 31-year-old female that presents to the office for transition of care management visit accompanied by her daughter. She was in [facility] ([place], [place]) at the end of July after having a fall at home. She dislocated her hip and was transferred to [facility] for surgical procedure. She had robotic hip surgery and feels that all of her pain is better and she is doing great, ambulating with a walker. She is receiving physical therapy at home. She has been home for about 2 to 3 weeks at this point. She also has a nurse that comes overnight. Unfortunately since she was in the hospital she has been having some diarrhea episodes. This is calm largely at nighttime. Her stool has been somewhat formed. She states it is better in the past few days since she has been eating more solid foods regularly. She does have history of GIST tumor and continues to follow with Dr. Delia Alonzo. She also has been off many of her medications since the surgical procedure and wants to discuss whether she can resume some of them. She does have a history of type 2 diabetes and was previously on basal insulin therapy at 22 units. She recently resumed at 12 units and since doing so her diarrhea seems to be a little bit better and her morning sugars are around 160-180. She has also been off of her benazepril with hydrochlorothiazide and she notes that she is having some swelling of the lower extremities. Review of Systems   Constitutional: Negative for appetite change and fever. Respiratory: Negative for chest tightness and shortness of breath. Cardiovascular: Negative for chest pain, palpitations and leg swelling. Gastrointestinal: Negative for abdominal pain. Genitourinary: Negative for difficulty urinating. Musculoskeletal: Negative for arthralgias and myalgias. Skin: Negative for wound. Neurological: Negative for dizziness, light-headedness and headaches. Psychiatric/Behavioral: Negative for dysphoric mood and sleep disturbance. The patient is not nervous/anxious. Current Outpatient Medications on File Prior to Visit   Medication Sig   • acetaminophen (TYLENOL) 500 mg tablet Take 1,000 mg by mouth   • aspirin 81 MG tablet Take 81 mg by mouth   • B-D UF III MINI PEN NEEDLES 31G X 5 MM MISC USE DAILY WITH INSULIN   • benazepril-hydrochlorthiazide (LOTENSIN HCT) 10-12.5 MG per tablet TAKE ONE TABLET BY MOUTH EVERY DAY   • Biotin 1 MG CAPS Take 1 mg by mouth daily   • [employer]One Unifine Pentips Plus 31G X 5 MM MISC USE 4 TIMES DAILY   • insulin detemir (Levemir FlexTouch) 100 Units/mL injection pen Inject 22 units under the skin @ bedtime. • Lancets (onetouch ultrasoft) lancets TEST BLOOD SUGARS ONCE TO TWICE PER DAY   • loperamide (IMODIUM) 2 mg capsule Take 2 mg by mouth 2 (two) times a day   • melatonin 3 mg Take 3 mg by mouth   • Multiple Vitamins-Minerals (HAIR SKIN AND NAILS FORMULA PO) Take 1 capsule by mouth daily   • OneTouch Ultra test strip USE ONE STRIP TO TEST  BLOOD SUGARS ONCE TOTWICE DAILY   • psyllium (METAMUCIL) 58.6 % powder Take 1 packet by mouth daily     • simvastatin (ZOCOR) 10 mg tablet TAKE ONE TABLET BY MOUTH EVERY DAY AS DIRECTED   • Advair Diskus 250-50 MCG/ACT inhaler INHALE ONE PUFF BY MOUTH TWICE A DAY (Patient not taking: Reported on 9/7/2023)   • ERGOCALCIFEROL PO Take 5,000 Units by mouth Daily (Patient not taking: Reported on 9/7/2023)   • lidocaine (XYLOCAINE) 1 % lidocaine HCl 10 mg/mL (1 %) injection solution   Take by injection route.  (Patient not taking: Reported on 9/7/2023)       Objective     /78 (BP Location: Left arm, Patient Position: Sitting, Cuff Size: Adult)   Pulse 59   Temp 98 °F (36.7 °C) (Temporal)   Resp 16   Ht 5' 4" (1.626 m)   Wt 87.4 kg (192 lb 9.6 oz)   LMP  (LMP Unknown)   SpO2 95%   BMI 33.06 kg/m²     Physical Exam  Vitals and nursing note reviewed. Constitutional:       General: She is not in acute distress. Appearance: She is well-developed. HENT:      Head: Normocephalic and atraumatic. Right Ear: External ear normal.      Left Ear: External ear normal.      Nose: Nose normal.      Mouth/Throat:      Pharynx: No oropharyngeal exudate. Eyes:      Conjunctiva/sclera: Conjunctivae normal.      Pupils: Pupils are equal, round, and reactive to light. Neck:      Thyroid: No thyromegaly. Trachea: No tracheal deviation. Cardiovascular:      Rate and Rhythm: Normal rate and regular rhythm. Heart sounds: Normal heart sounds. No murmur heard. No friction rub. Pulmonary:      Effort: Pulmonary effort is normal. No respiratory distress. Breath sounds: Normal breath sounds. No wheezing or rales. Abdominal:      General: Bowel sounds are normal. There is no distension. Palpations: Abdomen is soft. Tenderness: There is no abdominal tenderness. There is no guarding or rebound. Musculoskeletal:         General: No tenderness. Normal range of motion. Cervical back: Normal range of motion and neck supple. Lymphadenopathy:      Cervical: No cervical adenopathy. Skin:     General: Skin is warm and dry. Findings: No erythema or rash. Neurological:      Mental Status: She is alert and oriented to person, place, and time. Cranial Nerves: No cranial nerve deficit. Coordination: Coordination normal.   Psychiatric:         Behavior: Behavior normal.         Thought Content:  Thought content normal.       Telly Miller PA-C

## 2023-09-08 ENCOUNTER — TELEPHONE (OUTPATIENT)
Dept: FAMILY MEDICINE CLINIC | Facility: CLINIC | Age: 88
End: 2023-09-08

## 2023-09-08 DIAGNOSIS — R26.2 AMBULATORY DYSFUNCTION: Primary | ICD-10-CM

## 2023-09-08 NOTE — TELEPHONE ENCOUNTER
Regine Joshi from [facility] called into the office in [place] of patient she is a physical therapist and was wondering if she can get a verbal order for occupational thearpy. I made caleb aware mona is not in the office but she wanted to know if mat can put in the order since he saw patient yesterday. Please advise. Thank You

## 2023-09-12 ENCOUNTER — TELEPHONE (OUTPATIENT)
Dept: FAMILY MEDICINE CLINIC | Facility: CLINIC | Age: 88
End: 2023-09-12

## 2023-09-12 DIAGNOSIS — R60.0 BILATERAL LOWER EXTREMITY EDEMA: ICD-10-CM

## 2023-09-12 DIAGNOSIS — R19.7 DIARRHEA, UNSPECIFIED TYPE: Primary | ICD-10-CM

## 2023-09-12 NOTE — TELEPHONE ENCOUNTER
"Pt's daughter came into office to leave a message to MS about how the pt is doing. \"Pt is starting back on all of her meds and the concern is her diarrhea has not gotten any better, it continues everyday. Pt has done therapy for her right hip and now both of her ankles are swollen. She is also having therapy at home twice a week. Pt's daughter is concerned about the swelling in her legs because she has had blood clots in the past\"  "

## 2023-09-13 NOTE — TELEPHONE ENCOUNTER
I would recommend performing some stool cultures and C. difficile testing.  I will place orders for these and patient can  specimen collection kit at the labs.  I will also order bilateral ultrasound of the veins to be assessed in order to rule out clot.

## 2023-09-15 ENCOUNTER — HOSPITAL ENCOUNTER (OUTPATIENT)
Dept: NON INVASIVE DIAGNOSTICS | Facility: HOSPITAL | Age: 88
Discharge: HOME | End: 2023-09-15
Payer: MEDICARE

## 2023-09-15 DIAGNOSIS — R60.0 BILATERAL LOWER EXTREMITY EDEMA: ICD-10-CM

## 2023-09-15 PROCEDURE — 93970 EXTREMITY STUDY: CPT | Performed by: SURGERY

## 2023-09-15 PROCEDURE — 93970 EXTREMITY STUDY: CPT

## 2023-09-20 ENCOUNTER — APPOINTMENT (OUTPATIENT)
Dept: LAB | Facility: CLINIC | Age: 88
End: 2023-09-20
Payer: MEDICARE

## 2023-09-20 DIAGNOSIS — R19.7 DIARRHEA, UNSPECIFIED TYPE: ICD-10-CM

## 2023-09-20 PROCEDURE — 87177 OVA AND PARASITES SMEARS: CPT

## 2023-09-20 PROCEDURE — 87505 NFCT AGENT DETECTION GI: CPT

## 2023-09-20 PROCEDURE — 87209 SMEAR COMPLEX STAIN: CPT

## 2023-09-21 LAB
CAMPYLOBACTER DNA SPEC NAA+PROBE: NORMAL
SALMONELLA DNA SPEC QL NAA+PROBE: NORMAL
SHIGA TOXIN STX GENE SPEC NAA+PROBE: NORMAL
SHIGELLA DNA SPEC QL NAA+PROBE: NORMAL

## 2023-09-29 ENCOUNTER — OFFICE VISIT (OUTPATIENT)
Dept: FAMILY MEDICINE CLINIC | Facility: CLINIC | Age: 88
End: 2023-09-29
Payer: MEDICARE

## 2023-09-29 VITALS
OXYGEN SATURATION: 94 % | BODY MASS INDEX: 32.78 KG/M2 | WEIGHT: 192 LBS | DIASTOLIC BLOOD PRESSURE: 76 MMHG | HEART RATE: 82 BPM | HEIGHT: 64 IN | SYSTOLIC BLOOD PRESSURE: 142 MMHG

## 2023-09-29 DIAGNOSIS — I10 ESSENTIAL HYPERTENSION: ICD-10-CM

## 2023-09-29 DIAGNOSIS — E11.9 TYPE 2 DIABETES MELLITUS WITHOUT COMPLICATION, WITH LONG-TERM CURRENT USE OF INSULIN (HCC): ICD-10-CM

## 2023-09-29 DIAGNOSIS — Z00.00 HEALTHCARE MAINTENANCE: ICD-10-CM

## 2023-09-29 DIAGNOSIS — Z79.4 TYPE 2 DIABETES MELLITUS WITHOUT COMPLICATION, WITH LONG-TERM CURRENT USE OF INSULIN (HCC): ICD-10-CM

## 2023-09-29 DIAGNOSIS — J45.20 MILD INTERMITTENT ASTHMA WITHOUT COMPLICATION: ICD-10-CM

## 2023-09-29 DIAGNOSIS — E78.2 MIXED HYPERLIPIDEMIA: ICD-10-CM

## 2023-09-29 DIAGNOSIS — R19.7 DIARRHEA, UNSPECIFIED TYPE: ICD-10-CM

## 2023-09-29 DIAGNOSIS — F43.21 GRIEF REACTION: Primary | ICD-10-CM

## 2023-09-29 PROCEDURE — 99214 OFFICE O/P EST MOD 30 MIN: CPT | Performed by: PHYSICIAN ASSISTANT

## 2023-09-29 PROCEDURE — G0439 PPPS, SUBSEQ VISIT: HCPCS | Performed by: PHYSICIAN ASSISTANT

## 2023-09-29 RX ORDER — IBUPROFEN 200 MG
TABLET ORAL EVERY 6 HOURS PRN
COMMUNITY

## 2023-09-29 RX ORDER — SERTRALINE HYDROCHLORIDE 25 MG/1
25 TABLET, FILM COATED ORAL DAILY
COMMUNITY

## 2023-09-29 RX ORDER — FLUTICASONE PROPIONATE 50 MCG
1 SPRAY, SUSPENSION (ML) NASAL DAILY
COMMUNITY

## 2023-09-29 NOTE — PROGRESS NOTES
Assessment and Plan:     Patient Instructions   Assessment/plan:  1. Grief reaction-patient is doing rather well. She is currently on sertraline 25 mg and will continue. 2.  Type 2 diabetes-patient continues to follow with Dr. Silvestre Almanza. Her last hemoglobin A1c was 6.8 with basal insulin therapy on Levemir. She has follow-up scheduled in November. 3.  Benign essential hypertension-stable with benazepril and hydrochlorothiazide combination. 4.  Mild intermittent asthma-stable with inhaler therapy as necessary. 5.  Mixed hyperlipidemia-stable on simvastatin 10 mg daily. 6.  Diarrhea-patient is having some persistent symptoms. Stool cultures were normal.  She is still having 3-5 episodes daily. Would recommend evaluation with Dr. Aurora Harris whom she has seen previously for this. 7.  Healthcare maintenance-annual Medicare wellness visit. Flu vaccine was declined by patient. She would like to get it later in the season when she sees Dr. Aurora Harris in November. Problem List Items Addressed This Visit        Endocrine    Type 2 diabetes mellitus without complication, with long-term current use of insulin ([address])       Respiratory    Asthma       Cardiovascular and Mediastinum    Essential hypertension       Other    Mixed hyperlipidemia   Other Visit Diagnoses     Grief reaction    -  Primary    Healthcare maintenance        Diarrhea, unspecified type        Relevant Orders    Ambulatory Referral to Colorectal Surgery           Preventive health issues were discussed with patient, and age appropriate screening tests were ordered as noted in patient's After Visit Summary. Personalized health advice and appropriate referrals for health education or preventive services given if needed, as noted in patient's After Visit Summary. History of Present Illness:     Patient presents for a Medicare Wellness Visit    HPI: This is an 59-year-old female that presents to the office with her daughter.   She recently lost her  of 18 years, Aric Posada. She is transitioning to assisted living facility and needs papers filled out for admission. She does have history of type 2 diabetes which has currently been stable with 12 units of Levemir insulin once daily. She has hyperlipidemia and continues 10 mg of simvastatin daily. She has had problems with anxiety and feels that she has been doing well on sertraline 25 mg daily even with her current grieving process. She has a history of hypertension and continues benazepril with hydrochlorothiazide. She also has mild intermittent asthma and uses inhaler therapy when necessary. She has been ambulating with a walker and has been getting around and able to get to the bathroom and out of bed without difficulty on her own. She continues to recall her medications and be able to take them daily herself. Patient does continue with diarrhea episodes. She is having 3-5 episodes per day. She is not having difficulty getting to the toilet on time but it is bothersome. She did have stool culture test which were normal.  She has been trying fiber supplement but it does not seem to be making much difference. Patient Care Team:  Jovita Schumacher MD as PCP - General (Family Medicine)     Review of Systems:     Review of Systems   Constitutional: Negative for chills, fatigue and fever. HENT: Negative for congestion, ear pain and sinus pressure. Eyes: Negative for visual disturbance. Respiratory: Negative for cough, chest tightness and shortness of breath. Cardiovascular: Negative for chest pain and palpitations. Gastrointestinal: Negative for diarrhea, nausea and vomiting. Endocrine: Negative for polyuria. Genitourinary: Negative for dysuria and frequency. Musculoskeletal: Negative for arthralgias and myalgias. Skin: Negative for pallor and rash. Neurological: Negative for dizziness, weakness, light-headedness, numbness and headaches.    Psychiatric/Behavioral: Negative for agitation, behavioral problems and sleep disturbance. All other systems reviewed and are negative. Problem List:     Patient Active Problem List   Diagnosis   • Essential hypertension   • Mixed hyperlipidemia   • Asthma   • Vitamin D deficiency   • Osteoarthritis   • Obesity (BMI 30-39. 9)   • OAB (overactive bladder)   • Hx of pulmonary embolus   • Carotid artery stenosis   • Ambulatory dysfunction   • 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)   • Left hip pain   • Personal history of malignant neoplasm of uterus   • Left adrenal mass (HCC)   • Stress reaction   • Type 2 diabetes mellitus without complication, with long-term current use of insulin (HCC)   • Transient alteration of awareness   • Memory deficit   • Vitamin B12 deficiency   • Stricture of artery [facility])      Past Medical and Surgical History:     Past Medical History:   Diagnosis Date   • Cellulitis     last assessed 05/22/2013   • DVT (deep venous thrombosis) ([address])     last assessed 07/23/2014   • Hyperglycemia     last assessed 05/08/2013   • Poorly controlled type 2 diabetes mellitus ([address])     last assessed 03/27/2014   • Pulmonary embolism, bilateral ([address])     resolved 07/23/2014   • Stress reaction 9/26/2022     Past Surgical History:   Procedure Laterality Date   • COLECTOMY      partial, last assessed 07/23/2014   • HYSTERECTOMY  09/1974   • OOPHORECTOMY     • TONSILLECTOMY     • TOTAL HIP ARTHROPLASTY Bilateral     Once on the left and 3 times on the right side. [facility] [place]   • TOTAL KNEE ARTHROPLASTY Right     last assessed 01/13/2016, managed by Jeffery Tejeda MD      Family History:     Family History   Problem Relation Age of Onset   • Bone cancer Mother    • Lung cancer Father    • Multiple sclerosis Sister    • COPD Brother    • Breast cancer Maternal Grandmother    • Mental illness Family       Social History:     Social History     Socioeconomic History   • Marital status:       Spouse name: None   • Number of children: None   • Years of education: None   • Highest education level: None   Occupational History   • None   Tobacco Use   • Smoking status: Never   • Smokeless tobacco: Never   • Tobacco comments:     No secondhand smoke exposure   Substance and Sexual Activity   • Alcohol use: Yes     Comment: socially   • Drug use: No   • Sexual activity: None   Other Topics Concern   • None   Social History Narrative    Lives with significant other     Social Determinants of Health     Financial Resource Strain: Unknown (9/29/2023)    Overall Financial Resource Strain (CARDIA)    • Difficulty of Paying Living Expenses: Patient refused   Food Insecurity: Not on file   Transportation Needs: No Transportation Needs (9/29/2023)    PRAPARE - Transportation    • Lack of Transportation (Medical): No    • Lack of Transportation (Non-Medical): No   Physical Activity: Not on file   Stress: Not on file   Social Connections: Not on file   Intimate Partner Violence: Not on file   Housing Stability: Not on file      Medications and Allergies:     Current Outpatient Medications   Medication Sig Dispense Refill   • acetaminophen (TYLENOL) 500 mg tablet Take 1,000 mg by mouth     • aspirin 81 MG tablet Take 81 mg by mouth 2 (two) times a day     • benazepril-hydrochlorthiazide (LOTENSIN HCT) 10-12.5 MG per tablet TAKE ONE TABLET BY MOUTH EVERY DAY 90 tablet 3   • Biotin 1 MG CAPS Take 1 mg by mouth daily     • fluticasone (FLONASE) 50 mcg/act nasal spray 1 spray into each nostril daily     • ibuprofen (MOTRIN) 200 mg tablet Take by mouth every 6 (six) hours as needed for mild pain     • insulin detemir (Levemir FlexTouch) 100 Units/mL injection pen Inject 22 units under the skin @ bedtime.  15 mL 3   • melatonin 3 mg Take 3 mg by mouth     • Multiple Vitamins-Minerals (HAIR SKIN AND NAILS FORMULA PO) Take 1 capsule by mouth daily     • psyllium (METAMUCIL) 58.6 % powder Take 1 packet by mouth daily       • sertraline (ZOLOFT) 25 mg tablet Take 25 mg by mouth daily     • simvastatin (ZOCOR) 10 mg tablet TAKE ONE TABLET BY MOUTH EVERY DAY AS DIRECTED 90 tablet 3   • B-D UF III MINI PEN NEEDLES 31G X 5 MM MISC USE DAILY WITH INSULIN 100 each 1   • CareOne Unifine Pentips Plus 31G X 5 MM MISC USE 4 TIMES DAILY 100 each 3   • Lancets (onetouch ultrasoft) lancets TEST BLOOD SUGARS ONCE TO TWICE PER  each 1   • OneTouch Ultra test strip USE ONE STRIP TO TEST  BLOOD SUGARS ONCE TOTWICE DAILY 100 strip 3     Current Facility-Administered Medications   Medication Dose Route Frequency Provider Last Rate Last Admin   • cyanocobalamin injection 1,000 mcg  1,000 mcg Intramuscular Q30 Days Rukhsana Blanca MD         Allergies   Allergen Reactions   • Penicillin G Other (See Comments)   • Erythromycin Other (See Comments)     Other reaction(s): GI upset  diarrhea   • Metformin Diarrhea   • Other Angioedema   • Penicillins    • Codeine Nausea Only   • Doxycycline Other (See Comments)   • Levofloxacin Other (See Comments)     Category: Adverse Reaction; Annotation - [id number]: Tendon rupture      Immunizations:     Immunization History   Administered Date(s) Administered   • COVID-19 MODERNA VACC 0.5 ML IM 01/22/2021, 01/22/2021, 02/17/2021, 02/17/2021, 11/03/2021   • INFLUENZA 10/07/2015, 10/07/2015, 10/19/2018   • Influenza Quadrivalent Preservative Free 3 years and older IM 10/17/2014   • Influenza Split High Dose Preservative Free IM 10/07/2015, 11/08/2016, 09/15/2017   • Influenza, high dose seasonal 0.7 mL 10/22/2020, 09/21/2021, 10/28/2022   • Influenza, injectable, quadrivalent, preservative free 0.5 mL 10/03/2019   • Influenza, seasonal, injectable 10/08/2009, 10/19/2010, 09/22/2011, 10/09/2012   • Pneumococcal Polysaccharide PPV23 08/17/2015, 08/17/2015   • Tuberculin Skin Test 08/08/2023   • Zoster 06/01/2012   • influenza, trivalent, adjuvanted 10/19/2018      Health Maintenance: There are no preventive care reminders to display for this patient. Topic Date Due   • Pneumococcal Vaccine: 65+ Years (2 - PCV) 08/17/2016   • COVID-19 Vaccine (6 - Moderna series) 12/29/2021   • Influenza Vaccine (1) 09/01/2023      Medicare Screening Tests and Risk Assessments:     Dayana Martinez is here for her Subsequent Wellness visit. Health Risk Assessment:   Patient rates overall health as very good. Patient feels that their physical health rating is much better. Patient is very satisfied with their life. Eyesight was rated as same. Hearing was rated as same. Patient feels that their emotional and mental health rating is much worse. Patients states they are never, rarely angry. Patient states they are often unusually tired/fatigued. Pain experienced in the last 7 days has been some. Patient's pain rating has been 7/10. Patient states that she has experienced no weight loss or gain in last 6 months. Fall Risk Screening: In the past year, patient has experienced: history of falling in past year    Number of falls: 1  Injured during fall?: Yes    Feels unsteady when standing or walking?: Yes    Worried about falling?: Yes      Urinary Incontinence Screening:   Patient has leaked urine accidently in the last six months. Home Safety:  Patient has trouble with stairs inside or outside of their home. Patient has working smoke alarms and has working carbon monoxide detector. Home safety hazards include: none. Nutrition:   Current diet is Low Cholesterol and Low Saturated Fat. Medications:   Patient is able to manage medications. Activities of Daily Living (ADLs)/Instrumental Activities of Daily Living (IADLs):   Walk and transfer into and out of bed and chair?: Yes  Dress and groom yourself?: Yes    Bathe or shower yourself?: No    Feed yourself?  Yes  Do your laundry/housekeeping?: No  Manage your money, pay your bills and track your expenses?: No  Make your own meals?: Yes    Do your own shopping?: No    Previous Hospitalizations:   Any hospitalizations or ED visits within the last 12 months?: Yes    How many hospitalizations have you had in the last year?: 3-4    Hospitalization Comments: Due to gastric issues    Advance Care Planning:   Living will: Yes    Durable POA for healthcare: Yes    Advanced directive: Yes      Cognitive Screening:   Provider or family/friend/caregiver concerned regarding cognition?: No    PREVENTIVE SCREENINGS      Cardiovascular Screening:    General: Screening Not Indicated and History Lipid Disorder      Diabetes Screening:     General: Screening Not Indicated and History Diabetes      Colorectal Cancer Screening:     General: Screening Not Indicated      Breast Cancer Screening:     General: Screening Not Indicated      Cervical Cancer Screening:    General: Screening Not Indicated      Osteoporosis Screening:    General: Screening Not Indicated      Abdominal Aortic Aneurysm (AAA) Screening:        General: Screening Not Indicated      Lung Cancer Screening:     General: Screening Not Indicated      Hepatitis C Screening:    General: Screening Not Indicated    Screening, Brief Intervention, and Referral to Treatment (SBIRT)    Screening  Typical number of drinks in a day: 0  Typical number of drinks in a week: 2  Interpretation: Low risk drinking behavior. Single Item Drug Screening:  How often have you used an illegal drug (including marijuana) or a prescription medication for non-medical reasons in the past year? never    Single Item Drug Screen Score: 0  Interpretation: Negative screen for possible drug use disorder    No results found. Physical Exam:     /76 (BP Location: Left arm, Patient Position: Sitting, Cuff Size: Standard)   Pulse 82   Ht 5' 4" (1.626 m)   Wt 87.1 kg (192 lb)   LMP  (LMP Unknown)   SpO2 94%   BMI 32.96 kg/m²     Physical Exam  Constitutional:       General: She is not in acute distress. Appearance: Normal appearance. Comments: Ambulating with a walker.    HENT:      Head: Normocephalic and atraumatic. Right Ear: Tympanic membrane normal.      Left Ear: Tympanic membrane normal.      Nose: No congestion or rhinorrhea. Eyes:      Conjunctiva/sclera: Conjunctivae normal.      Pupils: Pupils are equal, round, and reactive to light. Neck:      Vascular: No carotid bruit. Cardiovascular:      Rate and Rhythm: Normal rate and regular rhythm. Heart sounds: No murmur heard. Pulmonary:      Effort: Pulmonary effort is normal. No respiratory distress. Breath sounds: Normal breath sounds. Abdominal:      Palpations: Abdomen is soft. Musculoskeletal:         General: Normal range of motion. Cervical back: Normal range of motion and neck supple. No muscular tenderness. Right lower leg: Edema present. Left lower leg: Edema present. Comments: 1+ pitting bilateral lower extremity pedal edema to the malleolus. Lymphadenopathy:      Cervical: No cervical adenopathy. Skin:     General: Skin is warm. Capillary Refill: Capillary refill takes less than 2 seconds. Neurological:      General: No focal deficit present. Mental Status: She is alert and oriented to person, place, and time.    Psychiatric:         Mood and Affect: Mood normal.        Diabetic Foot Exam  Crispin Salinas PA-C

## 2023-09-29 NOTE — PATIENT INSTRUCTIONS
Assessment/plan:  1. Grief reaction-patient is doing rather well. She is currently on sertraline 25 mg and will continue. 2.  Type 2 diabetes-patient continues to follow with Dr. Delon Thurston. Her last hemoglobin A1c was 6.8 with basal insulin therapy on Levemir. She has follow-up scheduled in November. 3.  Benign essential hypertension-stable with benazepril and hydrochlorothiazide combination. 4.  Mild intermittent asthma-stable with inhaler therapy as necessary. 5.  Mixed hyperlipidemia-stable on simvastatin 10 mg daily. 6.  Diarrhea-patient is having some persistent symptoms. Stool cultures were normal.  She is still having 3-5 episodes daily. Would recommend evaluation with Dr. Aldo Ferro whom she has seen previously for this. 7.  Healthcare maintenance-annual Medicare wellness visit. Flu vaccine was declined by patient. She would like to get it later in the season when she sees Dr. Aldo Ferro in November.

## 2023-10-11 ENCOUNTER — TELEPHONE (OUTPATIENT)
Dept: FAMILY MEDICINE CLINIC | Facility: CLINIC | Age: 88
End: 2023-10-11

## 2023-10-11 NOTE — TELEPHONE ENCOUNTER
The follow message was left on the voicemail line       "Hi, my name is Aniyah Nash. [place] [facility] [place] need the doctor to call me back ASAP. I need to follow up on medications because the DME does not match a medication list that the family brought in. Could you please have the doctor call me back as soon as possible so that I can get this clarified today. She just moved into our facility again. My name is Aniyah Nash [place] [facility]. Number here is [phone]. Thank you.

## 2023-10-24 DIAGNOSIS — I10 ESSENTIAL HYPERTENSION: Primary | ICD-10-CM

## 2023-10-24 RX ORDER — BENAZEPRIL/HYDROCHLOROTHIAZIDE 20 MG-25MG
TABLET ORAL
Qty: 15 TABLET | Refills: 5 | Status: SHIPPED | OUTPATIENT
Start: 2023-10-24

## 2023-10-26 DIAGNOSIS — M19.91 PRIMARY OSTEOARTHRITIS, UNSPECIFIED SITE: ICD-10-CM

## 2023-10-26 DIAGNOSIS — F43.0 STRESS REACTION: Primary | ICD-10-CM

## 2023-10-26 DIAGNOSIS — E78.00 PURE HYPERCHOLESTEROLEMIA: ICD-10-CM

## 2023-10-26 DIAGNOSIS — I26.99 PULMONARY EMBOLISM, UNSPECIFIED CHRONICITY, UNSPECIFIED PULMONARY EMBOLISM TYPE, UNSPECIFIED WHETHER ACUTE COR PULMONALE PRESENT (HCC): ICD-10-CM

## 2023-10-26 RX ORDER — SIMVASTATIN 10 MG
10 TABLET ORAL DAILY
Qty: 30 TABLET | Refills: 5 | Status: SHIPPED | OUTPATIENT
Start: 2023-10-26

## 2023-10-26 RX ORDER — ASPIRIN 81 MG/1
TABLET, CHEWABLE ORAL
Qty: 60 TABLET | Refills: 5 | Status: SHIPPED | OUTPATIENT
Start: 2023-10-26

## 2023-10-26 RX ORDER — PSEUDOEPHED/ACETAMINOPH/DIPHEN 30MG-500MG
TABLET ORAL
Qty: 30 TABLET | Refills: 5 | Status: SHIPPED | OUTPATIENT
Start: 2023-10-26

## 2023-10-26 RX ORDER — SERTRALINE HYDROCHLORIDE 25 MG/1
TABLET, FILM COATED ORAL
Qty: 30 TABLET | Refills: 5 | Status: SHIPPED | OUTPATIENT
Start: 2023-10-26

## 2023-11-15 PROBLEM — M62.81 GENERALIZED MUSCLE WEAKNESS: Status: ACTIVE | Noted: 2023-07-28

## 2023-11-15 PROBLEM — S72.009A HIP FRACTURE (HCC): Status: ACTIVE | Noted: 2023-07-24

## 2023-11-15 PROBLEM — T84.020A DISPLACEMENT OF INTERNAL RIGHT HIP PROSTHESIS (HCC): Status: ACTIVE | Noted: 2023-07-28

## 2023-12-26 DIAGNOSIS — E11.65 UNCONTROLLED TYPE 2 DIABETES MELLITUS WITH HYPERGLYCEMIA (HCC): ICD-10-CM

## 2023-12-26 RX ORDER — INSULIN DETEMIR 100 [IU]/ML
INJECTION, SOLUTION SUBCUTANEOUS
Qty: 15 ML | Refills: 11 | Status: SHIPPED | OUTPATIENT
Start: 2023-12-26

## 2024-01-30 DIAGNOSIS — J30.2 SEASONAL ALLERGIC RHINITIS, UNSPECIFIED TRIGGER: Primary | ICD-10-CM

## 2024-01-30 RX ORDER — FLUTICASONE PROPIONATE 50 MCG
SPRAY, SUSPENSION (ML) NASAL
Qty: 16 G | Refills: 11 | Status: SHIPPED | OUTPATIENT
Start: 2024-01-30

## 2024-04-22 DIAGNOSIS — E78.00 PURE HYPERCHOLESTEROLEMIA: ICD-10-CM

## 2024-04-22 DIAGNOSIS — M19.91 PRIMARY OSTEOARTHRITIS, UNSPECIFIED SITE: ICD-10-CM

## 2024-04-22 DIAGNOSIS — I26.99 PULMONARY EMBOLISM, UNSPECIFIED CHRONICITY, UNSPECIFIED PULMONARY EMBOLISM TYPE, UNSPECIFIED WHETHER ACUTE COR PULMONALE PRESENT (HCC): ICD-10-CM

## 2024-04-22 DIAGNOSIS — I10 ESSENTIAL HYPERTENSION: ICD-10-CM

## 2024-04-23 RX ORDER — BENAZEPRIL/HYDROCHLOROTHIAZIDE 20 MG-25MG
TABLET ORAL
Qty: 15 TABLET | Refills: 5 | Status: SHIPPED | OUTPATIENT
Start: 2024-04-23

## 2024-04-23 RX ORDER — SIMVASTATIN 10 MG
10 TABLET ORAL DAILY
Qty: 30 TABLET | Refills: 5 | Status: SHIPPED | OUTPATIENT
Start: 2024-04-23

## 2024-04-23 RX ORDER — PSEUDOEPHED/ACETAMINOPH/DIPHEN 30MG-500MG
TABLET ORAL
Qty: 30 TABLET | Refills: 5 | Status: SHIPPED | OUTPATIENT
Start: 2024-04-23

## 2024-04-23 RX ORDER — ASPIRIN 81 MG/1
TABLET, CHEWABLE ORAL
Qty: 60 TABLET | Refills: 5 | Status: SHIPPED | OUTPATIENT
Start: 2024-04-23

## 2024-05-28 ENCOUNTER — HOSPITAL ENCOUNTER (EMERGENCY)
Facility: HOSPITAL | Age: 89
Discharge: HOME | End: 2024-05-28
Attending: EMERGENCY MEDICINE
Payer: MEDICARE

## 2024-05-28 VITALS
RESPIRATION RATE: 18 BRPM | SYSTOLIC BLOOD PRESSURE: 146 MMHG | DIASTOLIC BLOOD PRESSURE: 66 MMHG | OXYGEN SATURATION: 98 % | HEART RATE: 70 BPM | TEMPERATURE: 97.7 F

## 2024-05-28 DIAGNOSIS — T50.901A ACCIDENTAL DRUG INGESTION: Primary | ICD-10-CM

## 2024-05-28 PROCEDURE — 99284 EMERGENCY DEPT VISIT MOD MDM: CPT

## 2024-05-28 PROCEDURE — 99284 EMERGENCY DEPT VISIT MOD MDM: CPT | Performed by: EMERGENCY MEDICINE

## 2024-05-28 RX ORDER — DOCUSATE SODIUM 100 MG/1
100 CAPSULE, LIQUID FILLED ORAL 2 TIMES DAILY
COMMUNITY

## 2024-05-28 RX ORDER — FUROSEMIDE 20 MG/1
20 TABLET ORAL DAILY
COMMUNITY

## 2024-05-28 NOTE — ED NOTES
RN called [facility] to confirm exact med error details.  RN spoke with Rosana who indicated pt received about 1/4 tsp of incorrect medications this morning around 0923 and was administered her normal morning medications which are confirmed in med management          Little Bauman RN  05/28/24 2959

## 2024-05-28 NOTE — ED NOTES
Per paper from [facility], pt received incorrect medications.     Pt received   Buspirone 15 mg  Memantine 10 mg  Citalopram 10mg  Metformin 500mg  Multi senior Vitamin D3 1000mg  Folic acid 1 mg  CO Q-10 200 mg      Angélica German  05/28/24 1100

## 2024-05-28 NOTE — DISCHARGE INSTRUCTIONS
Continue with patient's regular medications as scheduled.    Return for any trouble breathing, persistent vomiting, mental status changes, or for any concerns.    The patient does have a documented allergy to metformin with the reaction being diarrhea, so if she develops some loose stools, it is likely due to the metformin and does not need evaluation in the Emergency Department.

## 2024-05-28 NOTE — ED PROVIDER NOTES
History  Chief Complaint   Patient presents with    Medication Problem     Pt is from [facility] and was administered another pts medictions.     sarika strickland from [facility] for evaluation.  Per EMS, facility reported that pt was accidentally given another resident's medicaitons: buspirone 15mg, memantine 10mg, citalopram 10mg, metformin 500mg, Co Q10 200mg, MVI, Vit D3 1000 units, folic acid 1mg.    Pt has no complaints at this time.    Upon chart review, pt has a reported allergy to metformin w/ a reaction documented as diarrhea.  No allergies reported to the remaining medications.       History provided by:  Patient, medical records and nursing home   used: No        Prior to Admission Medications   Prescriptions Last Dose Informant Patient Reported? Taking?   Acetaminophen Extra Strength 500 MG TABS 5/28/2024  No Yes   Sig: TAKE 1 CAPLET ORALLY IN THE MORNING (ARTHRITIS) *NOT TO EXCEED 3GM APAP/24HR*   B-D UF III MINI PEN NEEDLES 31G X 5 MM MISC Unknown Self No No   Sig: USE DAILY WITH INSULIN   Biotin 1 MG CAPS  Self Yes No   Sig: Take 1 mg by mouth daily   [employer] Unifine Pentips Plus 31G X 5 MM MISC  Self No No   Sig: USE 4 TIMES DAILY   Lancets (onetouch ultrasoft) lancets Unknown Self No No   Sig: TEST BLOOD SUGARS ONCE TO TWICE PER DAY   Multiple Vitamins-Minerals (HAIR SKIN AND NAILS FORMULA PO)  Self Yes No   Sig: Take 1 capsule by mouth daily   OneTouch Ultra test strip  Self No No   Sig: USE ONE STRIP TO TEST  BLOOD SUGARS ONCE TOTWICE DAILY   aspirin 81 MG tablet  Self Yes Yes   Sig: Take 81 mg by mouth 2 (two) times a day   aspirin 81 mg chewable tablet 5/28/2024  No Yes   Sig: CHEW 1 TABLET AND SWALLOW ORALLY TWICE DAILY (PULMONARY EMBOLISM)   benazepril-hydrochlorthiazide (LOTENSIN HCT) 10-12.5 MG per tablet 5/28/2024 Self No Yes   Sig: TAKE ONE TABLET BY MOUTH EVERY DAY   benazepril-hydrochlorthiazide (LOTENSIN HCT) 20-25 MG per tablet   No Yes   Sig: TAKE 1/2 TABLET  (10-12.5MG) ORALLY DAILY (HYPERTENSION)   docusate sodium (COLACE) 100 mg capsule  Outside Facility (Specify) Yes Yes   Sig: Take 100 mg by mouth 2 (two) times a day   fluticasone (FLONASE) 50 mcg/act nasal spray   No No   Sig: INHALE 2 SPRAYS INTO EACH NOSTRIL DAILY (ALLERGIES)   furosemide (LASIX) 20 mg tablet 5/28/2024 Outside Facility (Specify) Yes Yes   Sig: Take 20 mg by mouth daily   ibuprofen (MOTRIN) 200 mg tablet   Yes No   Sig: Take by mouth every 6 (six) hours as needed for mild pain   insulin detemir (Levemir FlexPen) 100 Units/mL injection pen Unknown  No No   Sig: INJECT 12 UNITS SUBCUTANEOUSLY DAILY (DIABETES) (ROTATE SITES)   melatonin 3 mg  Self Yes No   Sig: Take 3 mg by mouth   psyllium (METAMUCIL) 58.6 % powder  Self Yes No   Sig: Take 1 packet by mouth daily     sertraline (ZOLOFT) 25 mg tablet 5/28/2024  No Yes   Sig: TAKE 1 TABLET ORALLY DAILY FOR ANXIETY   simvastatin (ZOCOR) 10 mg tablet 5/28/2024  No Yes   Sig: TAKE 1 TABLET ORALLY DAILY (HYPERLIPIDEMIA)      Facility-Administered Medications Last Administration Doses Remaining   cyanocobalamin injection 1,000 mcg None recorded           Past Medical History:   Diagnosis Date    Cellulitis     last assessed 05/22/2013    DVT (deep venous thrombosis) (HCC)     last assessed 07/23/2014    Hyperglycemia     last assessed 05/08/2013    Poorly controlled type 2 diabetes mellitus (HCC)     last assessed 03/27/2014    Pulmonary embolism, bilateral (HCC)     resolved 07/23/2014    Stress reaction 9/26/2022       Past Surgical History:   Procedure Laterality Date    COLECTOMY      partial, last assessed 07/23/2014    HYSTERECTOMY  09/1974    OOPHORECTOMY      TONSILLECTOMY      TOTAL HIP ARTHROPLASTY Bilateral     Once on the left and 3 times on the right side.  Theodora Ring    TOTAL KNEE ARTHROPLASTY Right     last assessed 01/13/2016, managed by Alexis Manning MD       Family History   Problem Relation Age of Onset    Bone cancer Mother     Lung  cancer Father     Multiple sclerosis Sister     COPD Brother     Breast cancer Maternal Grandmother     Mental illness Family      I have reviewed and agree with the history as documented.    E-Cigarette/Vaping     E-Cigarette/Vaping Substances     Social History     Tobacco Use    Smoking status: Never    Smokeless tobacco: Never    Tobacco comments:     No secondhand smoke exposure   Substance Use Topics    Alcohol use: Yes     Comment: socially    Drug use: No       Review of Systems   All other systems reviewed and are negative.      Physical Exam  Physical Exam  Vitals and nursing note reviewed.   Constitutional:       Appearance: Normal appearance.   HENT:      Mouth/Throat:      Mouth: Mucous membranes are moist.   Eyes:      Conjunctiva/sclera: Conjunctivae normal.      Pupils: Pupils are equal, round, and reactive to light.   Cardiovascular:      Rate and Rhythm: Normal rate.   Pulmonary:      Effort: Pulmonary effort is normal.   Abdominal:      Palpations: Abdomen is soft.   Musculoskeletal:      Cervical back: Normal range of motion.   Skin:     General: Skin is warm.   Neurological:      General: No focal deficit present.      Mental Status: She is alert. Mental status is at baseline.   Psychiatric:         Mood and Affect: Mood normal.         Vital Signs  ED Triage Vitals [05/28/24 1045]   Temperature Pulse Respirations Blood Pressure SpO2   97.7 °F (36.5 °C) 70 18 146/66 98 %      Temp Source Heart Rate Source Patient Position - Orthostatic VS BP Location FiO2 (%)   Oral Monitor Lying Left arm --      Pain Score       --           Vitals:    05/28/24 1045   BP: 146/66   Pulse: 70   Patient Position - Orthostatic VS: Lying         Visual Acuity      ED Medications  Medications - No data to display    Diagnostic Studies  Results Reviewed       None                   No orders to display              Procedures  Procedures         ED Course  ED Course as of 05/28/24 1414   Tue May 28, 2024   1116 D/w ED  Clinical Pharmacist:    Minimal potential interaction between pts sertraline and accidentally administered citalopram, but both at such low doses, highly unlikely.    No blood pressure medications given, so no concern for hemodynamic compromise.     Metformin does not have a direct effect on blood sugar and pt is already on regular blood sugar checks due to IDDM.                               SBIRT 20yo+      Flowsheet Row Most Recent Value   Initial Alcohol Screen: US AUDIT-C     1. How often do you have a drink containing alcohol? 0 Filed at: 05/28/2024 1052   2. How many drinks containing alcohol do you have on a typical day you are drinking?  0 Filed at: 05/28/2024 1052   3a. Male UNDER 65: How often do you have five or more drinks on one occasion? 0 Filed at: 05/28/2024 1052   3b. FEMALE Any Age, or MALE 65+: How often do you have 4 or more drinks on one occassion? 0 Filed at: 05/28/2024 1052   Audit-C Score 0 Filed at: 05/28/2024 1052   LANI: How many times in the past year have you...    Used an illegal drug or used a prescription medication for non-medical reasons? Never Filed at: 05/28/2024 1052                      Medical Decision Making  Accidental administration of incorrect medications.  D/w ED clinical pharmacist.  No concern for hemodynamic, cns, metabolic compromise at this time and pt does not require prolonged monitoring in the ED.  Pt is stable to return to her facility at this time.    Problems Addressed:  Accidental drug ingestion: acute illness or injury    Amount and/or Complexity of Data Reviewed  External Data Reviewed: notes.     Details: Outpatient FP notes reviewed  Home meds reviewed  Discussion of management or test interpretation with external provider(s): D/w ED Clinical pharmacist             Disposition  Final diagnoses:   Accidental drug ingestion     Time reflects when diagnosis was documented in both MDM as applicable and the Disposition within this note       Time User Action  Codes Description Comment    5/28/2024 11:25 AM Darlene Simms Add [T50.901A] Accidental drug ingestion           ED Disposition       ED Disposition   Discharge    Condition   Stable    Date/Time   Tue May 28, 2024 1124    Comment   Margarette Uriarte discharge to home/self care.                   Follow-up Information    None         Discharge Medication List as of 5/28/2024 11:26 AM        CONTINUE these medications which have NOT CHANGED    Details   Acetaminophen Extra Strength 500 MG TABS TAKE 1 CAPLET ORALLY IN THE MORNING (ARTHRITIS) *NOT TO EXCEED 3GM APAP/24HR*, Normal      aspirin 81 mg chewable tablet CHEW 1 TABLET AND SWALLOW ORALLY TWICE DAILY (PULMONARY EMBOLISM), Normal      aspirin 81 MG tablet Take 81 mg by mouth 2 (two) times a day, Historical Med      benazepril-hydrochlorthiazide (LOTENSIN HCT) 10-12.5 MG per tablet TAKE ONE TABLET BY MOUTH EVERY DAY, Normal      benazepril-hydrochlorthiazide (LOTENSIN HCT) 20-25 MG per tablet TAKE 1/2 TABLET (10-12.5MG) ORALLY DAILY (HYPERTENSION), Normal      docusate sodium (COLACE) 100 mg capsule Take 100 mg by mouth 2 (two) times a day, Historical Med      furosemide (LASIX) 20 mg tablet Take 20 mg by mouth daily, Historical Med      sertraline (ZOLOFT) 25 mg tablet TAKE 1 TABLET ORALLY DAILY FOR ANXIETY, Normal      simvastatin (ZOCOR) 10 mg tablet TAKE 1 TABLET ORALLY DAILY (HYPERLIPIDEMIA), Starting Tue 4/23/2024, Normal      !! B-D UF III MINI PEN NEEDLES 31G X 5 MM MISC USE DAILY WITH INSULIN, Normal      Biotin 1 MG CAPS Take 1 mg by mouth daily, Historical Med      !! [employer] Unifine Pentips Plus 31G X 5 MM MISC USE 4 TIMES DAILY, Starting Wed 8/23/2023, Normal      fluticasone (FLONASE) 50 mcg/act nasal spray INHALE 2 SPRAYS INTO EACH NOSTRIL DAILY (ALLERGIES), Normal      ibuprofen (MOTRIN) 200 mg tablet Take by mouth every 6 (six) hours as needed for mild pain, Historical Med      insulin detemir (Levemir FlexPen) 100 Units/mL injection pen  INJECT 12 UNITS SUBCUTANEOUSLY DAILY (DIABETES) (ROTATE SITES), Normal      Lancets (onetouch ultrasoft) lancets TEST BLOOD SUGARS ONCE TO TWICE PER DAY, Normal      melatonin 3 mg Take 3 mg by mouth, Starting Mon 8/21/2023, Historical Med      Multiple Vitamins-Minerals (HAIR SKIN AND NAILS FORMULA PO) Take 1 capsule by mouth daily, Historical Med      OneTouch Ultra test strip USE ONE STRIP TO TEST  BLOOD SUGARS ONCE TOTWICE DAILY, Normal      psyllium (METAMUCIL) 58.6 % powder Take 1 packet by mouth daily  , Historical Med       !! - Potential duplicate medications found. Please discuss with provider.          No discharge procedures on file.    PDMP Review       None            ED Provider  Electronically Signed by             Darlene Simms DO  05/28/24 0496

## 2024-08-07 ENCOUNTER — OFFICE VISIT (OUTPATIENT)
Dept: FAMILY MEDICINE CLINIC | Facility: CLINIC | Age: 89
End: 2024-08-07
Payer: MEDICARE

## 2024-08-07 VITALS
OXYGEN SATURATION: 96 % | HEIGHT: 64 IN | DIASTOLIC BLOOD PRESSURE: 86 MMHG | SYSTOLIC BLOOD PRESSURE: 122 MMHG | BODY MASS INDEX: 32.96 KG/M2 | HEART RATE: 85 BPM

## 2024-08-07 DIAGNOSIS — I10 ESSENTIAL HYPERTENSION: ICD-10-CM

## 2024-08-07 DIAGNOSIS — E11.9 TYPE 2 DIABETES MELLITUS WITHOUT COMPLICATION, WITH LONG-TERM CURRENT USE OF INSULIN (HCC): Primary | ICD-10-CM

## 2024-08-07 DIAGNOSIS — C49.A2 GASTROINTESTINAL STROMAL TUMOR (GIST) OF STOMACH (HCC): ICD-10-CM

## 2024-08-07 DIAGNOSIS — I77.1 STRICTURE OF ARTERY (HCC): ICD-10-CM

## 2024-08-07 DIAGNOSIS — Z79.4 TYPE 2 DIABETES MELLITUS WITHOUT COMPLICATION, WITH LONG-TERM CURRENT USE OF INSULIN (HCC): Primary | ICD-10-CM

## 2024-08-07 DIAGNOSIS — E78.2 MIXED HYPERLIPIDEMIA: ICD-10-CM

## 2024-08-07 DIAGNOSIS — E27.8 LEFT ADRENAL MASS (HCC): ICD-10-CM

## 2024-08-07 PROCEDURE — G2211 COMPLEX E/M VISIT ADD ON: HCPCS | Performed by: NURSE PRACTITIONER

## 2024-08-07 PROCEDURE — 99214 OFFICE O/P EST MOD 30 MIN: CPT | Performed by: NURSE PRACTITIONER

## 2024-08-07 NOTE — ASSESSMENT & PLAN NOTE
Repeat lipid panel was ordered to be completed prior to the patient's next office visit.  Patient was advised to continue to limit fatty and fried foods in her diet.

## 2024-08-07 NOTE — ASSESSMENT & PLAN NOTE
I did recommend that patient have repeat carotid artery Dopplers completed at this time however, the patient was not interested in having this test completed.

## 2024-08-07 NOTE — ASSESSMENT & PLAN NOTE
Lab Results   Component Value Date    HGBA1C 6.9 (H) 10/24/2023     Repeat hemoglobin A1c and urine microalbumin were ordered to be completed prior to the patient's next office visit.

## 2024-08-07 NOTE — PROGRESS NOTES
Ambulatory Visit  Name: Margarette Uriarte      : 1934      MRN: [id number]  Encounter Provider: GERARDO Singer  Encounter Date: 2024   Encounter department: [facility] PRIMARY CARE    Assessment & Plan   1. Type 2 diabetes mellitus without complication, with long-term current use of insulin (HCC)  Assessment & Plan:    Lab Results   Component Value Date    HGBA1C 6.9 (H) 10/24/2023     Repeat hemoglobin A1c and urine microalbumin were ordered to be completed prior to the patient's next office visit.  Orders:  -     Hemoglobin A1C; Future  -     Albumin / creatinine urine ratio; Future  2. Stricture of artery (HCC)  Assessment & Plan:  I did recommend that patient have repeat carotid artery Dopplers completed at this time however, the patient was not interested in having this test completed.  3. Mixed hyperlipidemia  Assessment & Plan:  Repeat lipid panel was ordered to be completed prior to the patient's next office visit.  Patient was advised to continue to limit fatty and fried foods in her diet.  Orders:  -     Lipid panel; Future  4. Left adrenal mass (HCC)  Assessment & Plan:  No further imaging is warranted at this time.  5. 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  6. Essential hypertension  Assessment & Plan:  Well-controlled on current regimen.        Depression Screening and Follow-up Plan: Patient was screened for depression during today's encounter. They screened negative with a PHQ-2 score of 0.    Falls Plan of Care: balance, strength, and gait training instructions were provided. Home safety education provided.     Urinary Incontinence Plan of Care: counseling topics discussed: practice Kegel (pelvic floor strengthening) exercises, use restroom every 2 hours and preventing constipation.       History of Present Illness     Stricture of artery: Patient had an MRI of her carotids  completed in 2021 which did show a 2 mm projection from the supraclinoid right internal carotid artery may represent an infundibulum or tiny aneurysm but no other abnormalities of the carotid arteries.  The patient denies any new lightheadedness, dizziness, or other neurological changes.    Left adrenal mass: Patient's most recent CTA of the abdomen was completed in 2023 and showed unchanged 3.4 cm left adrenal nodule, likely adenoma, has been present on CTs dating back to 2009.     GIST of stomach: Patient's most recent CTA of the abdomen did not show any intra-abdominal abnormalities.  Patient denies any current GI symptoms.    Type II diabetes: Patient's most recent hemoglobin A1c was 6.9 showing good control.  Patient denies polydipsia, polyphagia, or polyuria.    Hyperlipidemia: Patient has not had a lipid panel completed in some time.  Patient is currently managed on simvastatin 10 mg daily.    Healthcare maintenance: POLST form was completed with the patient and her daughter in the office today.  This will be scanned into the patient's chart at the .    Hypertension: Well-controlled on current medication regimen.          Review of Systems   Constitutional:  Negative for chills and fever.   HENT:  Negative for ear pain and sore throat.    Eyes:  Negative for pain and visual disturbance.   Respiratory:  Negative for cough, chest tightness, shortness of breath and wheezing.    Cardiovascular:  Negative for chest pain, palpitations and leg swelling.   Gastrointestinal:  Negative for abdominal pain, constipation, diarrhea, nausea and vomiting.   Endocrine: Negative for cold intolerance, heat intolerance, polydipsia, polyphagia and polyuria.   Genitourinary:  Negative for decreased urine volume, dysuria and hematuria.   Musculoskeletal:  Negative for arthralgias, back pain and myalgias.   Skin:  Negative for color change and rash.   Allergic/Immunologic: Negative for environmental allergies.  "  Neurological:  Negative for dizziness, seizures, syncope, weakness, light-headedness, numbness and headaches.   Hematological:  Negative for adenopathy.   Psychiatric/Behavioral:  Negative for confusion. The patient is not nervous/anxious.    All other systems reviewed and are negative.      Objective     /86 (BP Location: Right arm, Patient Position: Sitting, Cuff Size: Standard)   Pulse 85   Ht 5' 4\" (1.626 m)   LMP  (LMP Unknown)   SpO2 96%   BMI 32.96 kg/m²     Physical Exam  Vitals and nursing note reviewed.   Constitutional:       General: She is not in acute distress.     Appearance: Normal appearance. She is well-developed. She is not ill-appearing.   HENT:      Head: Normocephalic.   Eyes:      Conjunctiva/sclera: Conjunctivae normal.   Cardiovascular:      Rate and Rhythm: Normal rate and regular rhythm.      Pulses: Normal pulses.           Carotid pulses are 2+ on the right side and 2+ on the left side.       Radial pulses are 2+ on the right side and 2+ on the left side.        Posterior tibial pulses are 2+ on the right side and 2+ on the left side.      Heart sounds: Normal heart sounds. No murmur heard.  Pulmonary:      Effort: Pulmonary effort is normal. No respiratory distress.      Breath sounds: Normal breath sounds. No decreased breath sounds, wheezing, rhonchi or rales.   Abdominal:      General: Abdomen is flat. Bowel sounds are normal. There is no distension.      Palpations: Abdomen is soft.      Tenderness: There is no abdominal tenderness. There is no guarding.   Musculoskeletal:         General: No swelling. Normal range of motion.      Cervical back: Normal range of motion and neck supple.      Right lower leg: No edema.      Left lower leg: No edema.   Skin:     General: Skin is warm and dry.      Capillary Refill: Capillary refill takes less than 2 seconds.   Neurological:      General: No focal deficit present.      Mental Status: She is alert and oriented to person, " place, and time.   Psychiatric:         Mood and Affect: Mood normal.         Behavior: Behavior normal.         Thought Content: Thought content normal.         Judgment: Judgment normal.       Administrative Statements

## 2024-10-18 DIAGNOSIS — E78.00 PURE HYPERCHOLESTEROLEMIA: ICD-10-CM

## 2024-10-18 DIAGNOSIS — M19.91 PRIMARY OSTEOARTHRITIS, UNSPECIFIED SITE: ICD-10-CM

## 2024-10-18 DIAGNOSIS — I26.99 PULMONARY EMBOLISM, UNSPECIFIED CHRONICITY, UNSPECIFIED PULMONARY EMBOLISM TYPE, UNSPECIFIED WHETHER ACUTE COR PULMONALE PRESENT (HCC): ICD-10-CM

## 2024-10-18 DIAGNOSIS — I10 ESSENTIAL HYPERTENSION: ICD-10-CM

## 2024-10-18 RX ORDER — BENAZEPRIL/HYDROCHLOROTHIAZIDE 20 MG-25MG
TABLET ORAL
Qty: 15 TABLET | Refills: 5 | Status: SHIPPED | OUTPATIENT
Start: 2024-10-18

## 2024-10-18 RX ORDER — SIMVASTATIN 10 MG
10 TABLET ORAL DAILY
Qty: 30 TABLET | Refills: 5 | Status: SHIPPED | OUTPATIENT
Start: 2024-10-18

## 2024-10-18 RX ORDER — PSEUDOEPHED/ACETAMINOPH/DIPHEN 30MG-500MG
TABLET ORAL
Qty: 30 TABLET | Refills: 5 | Status: SHIPPED | OUTPATIENT
Start: 2024-10-18

## 2024-10-18 RX ORDER — ASPIRIN 81 MG/1
TABLET, CHEWABLE ORAL
Qty: 60 TABLET | Refills: 5 | Status: SHIPPED | OUTPATIENT
Start: 2024-10-18

## 2025-03-04 ENCOUNTER — OFFICE VISIT (OUTPATIENT)
Dept: FAMILY MEDICINE CLINIC | Facility: CLINIC | Age: OVER 89
End: 2025-03-04
Payer: MEDICARE

## 2025-03-04 VITALS
HEART RATE: 89 BPM | DIASTOLIC BLOOD PRESSURE: 82 MMHG | OXYGEN SATURATION: 98 % | HEIGHT: 64 IN | WEIGHT: 203 LBS | SYSTOLIC BLOOD PRESSURE: 126 MMHG | BODY MASS INDEX: 34.66 KG/M2 | TEMPERATURE: 97.9 F

## 2025-03-04 DIAGNOSIS — Z79.4 TYPE 2 DIABETES MELLITUS WITHOUT COMPLICATION, WITH LONG-TERM CURRENT USE OF INSULIN (HCC): ICD-10-CM

## 2025-03-04 DIAGNOSIS — E11.9 TYPE 2 DIABETES MELLITUS WITHOUT COMPLICATION, WITH LONG-TERM CURRENT USE OF INSULIN (HCC): ICD-10-CM

## 2025-03-04 DIAGNOSIS — C49.A2 GASTROINTESTINAL STROMAL TUMOR (GIST) OF STOMACH (HCC): ICD-10-CM

## 2025-03-04 DIAGNOSIS — J40 BRONCHITIS: Primary | ICD-10-CM

## 2025-03-04 PROCEDURE — G2211 COMPLEX E/M VISIT ADD ON: HCPCS | Performed by: PHYSICIAN ASSISTANT

## 2025-03-04 PROCEDURE — 99214 OFFICE O/P EST MOD 30 MIN: CPT | Performed by: PHYSICIAN ASSISTANT

## 2025-03-04 RX ORDER — SULFAMETHOXAZOLE AND TRIMETHOPRIM 800; 160 MG/1; MG/1
1 TABLET ORAL EVERY 12 HOURS SCHEDULED
Qty: 20 TABLET | Refills: 0 | Status: SHIPPED | OUTPATIENT
Start: 2025-03-04 | End: 2025-03-14

## 2025-03-04 NOTE — PROGRESS NOTES
Name: Margarette Uriarte      : 1934      MRN: [id number]  Encounter Provider: Ian Wetzel PA-C  Encounter Date: 3/4/2025   Encounter department: [facility] PRIMARY CARE  :  Assessment & Plan  Bronchitis  Recommend starting Bactrim DS, 1 tablet twice daily for 10 days.  Follow-up in the next 7 to 10 days if not improving.  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  Following with [facility] hematology/oncology Dr Scruggs       Type 2 diabetes mellitus without complication, with long-term current use of insulin (HCC)    Lab Results   Component Value Date    HGBA1C 11.9 (H) 2024   Not at goal.  Recommend scheduling follow-up and AWV with PCP in the next 3 months.                History of Present Illness   HPI: This is a 89-year-old female that presents to the office for concerns of a cough and chest congestion for the past 2 weeks.  Symptoms have not gotten better and she is coughing with some mucus production.  She does have a history of reactive airway disease and continues albuterol inhaler as necessary.  She also has history of uncontrolled diabetes and GIST tumor.  She has been following with [facility] hematology/oncology for this.  She does complain that she has been feeling a bit depressed at times as well.  She is currently on sertraline 50 mg and following with nurse practitioner through her living facility.      Review of Systems   Constitutional:  Positive for activity change and fatigue. Negative for fever.   HENT:  Positive for congestion, postnasal drip, rhinorrhea, sinus pressure and sore throat. Negative for ear pain.    Respiratory:  Positive for cough. Negative for chest tightness, shortness of breath and wheezing.    Cardiovascular:  Negative for  "palpitations.   Gastrointestinal:  Negative for diarrhea and nausea.   Musculoskeletal:  Positive for myalgias. Negative for arthralgias.   Skin:  Negative for rash.   Neurological:  Negative for dizziness and numbness.   All other systems reviewed and are negative.      Objective   /82 (BP Location: Right arm, Patient Position: Sitting, Cuff Size: Adult)   Pulse 89   Temp 97.9 °F (36.6 °C) (Tympanic)   Ht 5' 4\" (1.626 m)   Wt 92.1 kg (203 lb)   LMP  (LMP Unknown)   SpO2 98%   BMI 34.84 kg/m²      Physical Exam  Constitutional:       General: She is not in acute distress.     Appearance: Normal appearance.   HENT:      Head: Normocephalic and atraumatic.      Right Ear: Tympanic membrane normal.      Left Ear: Tympanic membrane normal.      Nose: No congestion or rhinorrhea.   Eyes:      Conjunctiva/sclera: Conjunctivae normal.      Pupils: Pupils are equal, round, and reactive to light.   Neck:      Vascular: No carotid bruit.   Cardiovascular:      Rate and Rhythm: Normal rate and regular rhythm.      Heart sounds: No murmur heard.  Pulmonary:      Effort: Pulmonary effort is normal. No respiratory distress.      Breath sounds: Wheezing present.      Comments: Scant crackles and wheezes bilateral lung fields.  Abdominal:      Palpations: Abdomen is soft.   Musculoskeletal:         General: Normal range of motion.      Cervical back: Normal range of motion and neck supple. No muscular tenderness.   Lymphadenopathy:      Cervical: No cervical adenopathy.   Skin:     General: Skin is warm.      Capillary Refill: Capillary refill takes less than 2 seconds.   Neurological:      General: No focal deficit present.      Mental Status: She is alert and oriented to person, place, and time.   Psychiatric:         Mood and Affect: Mood normal.         "

## 2025-03-04 NOTE — ASSESSMENT & PLAN NOTE
Stable, recommend follow-up with PCP in the next 3 months.  Following with [facility] hematology/oncology Dr Scruggs

## 2025-03-04 NOTE — ASSESSMENT & PLAN NOTE
Lab Results   Component Value Date    HGBA1C 11.9 (H) 12/04/2024   Not at goal.  Recommend scheduling follow-up and AWV with PCP in the next 3 months.

## 2025-04-11 ENCOUNTER — DOCUMENTATION (OUTPATIENT)
Dept: ADMINISTRATIVE | Facility: OTHER | Age: OVER 89
End: 2025-04-11

## 2025-04-11 NOTE — PROGRESS NOTES
04/11/25 1:28 PM    Annual Wellness Visit outreach is not required, the practice has scheduled the AWV.    Thank you.  Flo Alex MA  [facility]

## 2025-06-10 ENCOUNTER — RA CDI HCC (OUTPATIENT)
Dept: OTHER | Facility: HOSPITAL | Age: OVER 89
End: 2025-06-10

## 2025-08-12 ENCOUNTER — DOCUMENTATION (OUTPATIENT)
Dept: ADMINISTRATIVE | Facility: OTHER | Age: OVER 89
End: 2025-08-12